# Patient Record
Sex: MALE | Race: WHITE | NOT HISPANIC OR LATINO | Employment: OTHER | URBAN - METROPOLITAN AREA
[De-identification: names, ages, dates, MRNs, and addresses within clinical notes are randomized per-mention and may not be internally consistent; named-entity substitution may affect disease eponyms.]

---

## 2017-01-17 ENCOUNTER — OFFICE VISIT (OUTPATIENT)
Dept: FAMILY MEDICINE | Facility: CLINIC | Age: 67
End: 2017-01-17
Payer: COMMERCIAL

## 2017-01-17 VITALS
HEIGHT: 69 IN | HEART RATE: 66 BPM | TEMPERATURE: 98 F | SYSTOLIC BLOOD PRESSURE: 122 MMHG | BODY MASS INDEX: 21.49 KG/M2 | RESPIRATION RATE: 16 BRPM | WEIGHT: 145.1 LBS | OXYGEN SATURATION: 95 % | DIASTOLIC BLOOD PRESSURE: 80 MMHG

## 2017-01-17 DIAGNOSIS — Z23 NEED FOR PROPHYLACTIC VACCINATION AND INOCULATION AGAINST INFLUENZA: ICD-10-CM

## 2017-01-17 DIAGNOSIS — Z11.59 NEED FOR HEPATITIS C SCREENING TEST: ICD-10-CM

## 2017-01-17 DIAGNOSIS — F51.01 PRIMARY INSOMNIA: ICD-10-CM

## 2017-01-17 DIAGNOSIS — Z00.00 ROUTINE GENERAL MEDICAL EXAMINATION AT A HEALTH CARE FACILITY: Primary | ICD-10-CM

## 2017-01-17 DIAGNOSIS — N40.1 BENIGN NON-NODULAR PROSTATIC HYPERPLASIA WITH LOWER URINARY TRACT SYMPTOMS: ICD-10-CM

## 2017-01-17 DIAGNOSIS — Z23 NEED FOR TDAP VACCINATION: ICD-10-CM

## 2017-01-17 LAB
CHOLEST SERPL-MCNC: 188 MG/DL
GLUCOSE SERPL-MCNC: 87 MG/DL (ref 70–99)
HDLC SERPL-MCNC: 79 MG/DL
LDLC SERPL CALC-MCNC: 94 MG/DL
NONHDLC SERPL-MCNC: 109 MG/DL
PSA SERPL-ACNC: 1.42 UG/L (ref 0–4)
TRIGL SERPL-MCNC: 77 MG/DL

## 2017-01-17 PROCEDURE — 90715 TDAP VACCINE 7 YRS/> IM: CPT | Performed by: FAMILY MEDICINE

## 2017-01-17 PROCEDURE — 90472 IMMUNIZATION ADMIN EACH ADD: CPT | Performed by: FAMILY MEDICINE

## 2017-01-17 PROCEDURE — 36415 COLL VENOUS BLD VENIPUNCTURE: CPT | Performed by: FAMILY MEDICINE

## 2017-01-17 PROCEDURE — 80061 LIPID PANEL: CPT | Performed by: FAMILY MEDICINE

## 2017-01-17 PROCEDURE — 82947 ASSAY GLUCOSE BLOOD QUANT: CPT | Performed by: FAMILY MEDICINE

## 2017-01-17 PROCEDURE — 84153 ASSAY OF PSA TOTAL: CPT | Performed by: FAMILY MEDICINE

## 2017-01-17 PROCEDURE — 90471 IMMUNIZATION ADMIN: CPT | Performed by: FAMILY MEDICINE

## 2017-01-17 PROCEDURE — 86803 HEPATITIS C AB TEST: CPT | Performed by: FAMILY MEDICINE

## 2017-01-17 PROCEDURE — 90686 IIV4 VACC NO PRSV 0.5 ML IM: CPT | Performed by: FAMILY MEDICINE

## 2017-01-17 PROCEDURE — 99396 PREV VISIT EST AGE 40-64: CPT | Mod: 25 | Performed by: FAMILY MEDICINE

## 2017-01-17 RX ORDER — TRIAZOLAM 0.25 MG
TABLET ORAL
Qty: 30 TABLET | Refills: 2 | Status: SHIPPED | OUTPATIENT
Start: 2017-01-17 | End: 2017-10-12

## 2017-01-17 ASSESSMENT — PAIN SCALES - GENERAL: PAINLEVEL: NO PAIN (0)

## 2017-01-17 NOTE — PROGRESS NOTES
SUBJECTIVE:     CC: Carlos Soares is an 66 year old male who presents for preventative health visit.     Healthy Habits:    Do you get at least three servings of calcium containing foods daily (dairy, green leafy vegetables, etc.)? yes    Amount of exercise or daily activities, outside of work: 3 days weekly    Problems taking medications regularly No    Medication side effects: No    Have you had an eye exam in the past two years? yes    Do you see a dentist twice per year? yes  Do you have sleep apnea, excessive snoring or daytime drowsiness?no      Today's PHQ-2 Score:   PHQ-2 ( 1999 Pfizer) 1/17/2017 1/4/2016   Q1: Little interest or pleasure in doing things 0 0   Q2: Feeling down, depressed or hopeless 0 0   PHQ-2 Score 0 0       Abuse: Current or Past(Physical, Sexual or Emotional)- No  Do you feel safe in your environment - Yes    Social History   Substance Use Topics     Smoking status: Current Every Day Smoker -- 0.50 packs/day for 30 years     Types: Cigarettes     Smokeless tobacco: Never Used      Comment: 4-5 cigs daily     Alcohol Use: Yes      Comment: social     The patient does not drink >3 drinks per day nor >7 drinks per week.    Last PSA:   PSA   Date Value Ref Range Status   01/04/2016 0.92 0 - 4 ug/L Final       Recent Labs   Lab Test  01/04/16   0913  11/29/13   0959  11/19/12   0910   CHOL  174  173  158   HDL  64  76  74   LDL  78  79  66   TRIG  160*  95  90   CHOLHDLRATIO   --   2.3  2.1   NHDL  110   --    --        Reviewed orders with patient. Reviewed health maintenance and updated orders accordingly - Yes    All Histories reviewed and updated in Epic.  Here today for routine checkup. For the most part he is doing just fine. His biggest issue previously was BPH with nocturia. Retired a couple of weeks ago and with different sleeping schedule he says is actually doing a bit better. Not getting up at night to urinate as much and for now it is tolerable. We have tried both Flomax and  "Avodart in the past without a lot of results    ROS:  C: NEGATIVE for fever, chills, change in weight  I: NEGATIVE for worrisome rashes, moles or lesions  E: NEGATIVE for vision changes or irritation  ENT: NEGATIVE for ear, mouth and throat problems  R: NEGATIVE for significant cough or SOB  CV: NEGATIVE for chest pain, palpitations or peripheral edema  GI: NEGATIVE for nausea, abdominal pain, heartburn, or change in bowel habits   male: negative for dysuria, hematuria, decreased urinary stream, erectile dysfunction, urethral discharge  M: NEGATIVE for significant arthralgias or myalgia  N: NEGATIVE for weakness, dizziness or paresthesias  P: NEGATIVE for changes in mood or affect    Problem list, Medication list, Allergies, and Medical/Social/Surgical histories reviewed in [facility] and updated as appropriate.  Labs reviewed in EPIC  BP Readings from Last 3 Encounters:   01/17/17 122/80   08/19/16 126/78   01/04/16 114/74    Wt Readings from Last 3 Encounters:   01/17/17 65.817 kg (145 lb 1.6 oz)   08/19/16 64.411 kg (142 lb)   01/04/16 64.093 kg (141 lb 4.8 oz)                  OBJECTIVE:     /80 mmHg  Pulse 66  Temp(Src) 98  F (36.7  C) (Oral)  Resp 16  Ht 1.753 m (5' 9\")  Wt 65.817 kg (145 lb 1.6 oz)  BMI 21.42 kg/m2  SpO2 95%  EXAM:  GENERAL: healthy, alert and no distress  EYES: Eyes grossly normal to inspection, PERRL and conjunctivae and sclerae normal  HENT: ear canals and TM's normal, nose and mouth without ulcers or lesions  NECK: no adenopathy, no asymmetry, masses, or scars and thyroid normal to palpation  RESP: lungs clear to auscultation - no rales, rhonchi or wheezes  CV: regular rate and rhythm, normal S1 S2, no S3 or S4, no murmur, click or rub, no peripheral edema and peripheral pulses strong  ABDOMEN: soft, nontender, no hepatosplenomegaly, no masses and bowel sounds normal  MS: no gross musculoskeletal defects noted, no edema  SKIN: no suspicious lesions or rashes  NEURO: Normal " "strength and tone, mentation intact and speech normal  PSYCH: mentation appears normal, affect normal/bright    ASSESSMENT/PLAN:     1. Routine general medical examination at a health care facility  Update tetanus and flu shot today  - Lipid panel reflex to direct LDL  - Prostate spec antigen screen  - Glucose    2. Benign non-nodular prostatic hyperplasia with lower urinary tract symptoms  For now we will keep an eye on things without medical therapy. Consider referral if needed    3. Primary insomnia    - triazolam (HALCION) 0.25 MG tablet; TAKE 1 TABLET BY MOUTH EVERY NIGHT AT BEDTIME AS NEEDED  Dispense: 30 tablet; Refill: 2    4. Need for hepatitis C screening test    - Hepatitis C antibody    COUNSELING:  Reviewed preventive health counseling, as reflected in patient instructions       Regular exercise       Healthy diet/nutrition       Vision screening       Colon cancer screening       Prostate cancer screening         reports that he has been smoking Cigarettes.  He has a 15 pack-year smoking history. He has never used smokeless tobacco.  Tobacco Cessation Action Plan: Self help information given to patient  Estimated body mass index is 21.42 kg/(m^2) as calculated from the following:    Height as of this encounter: 1.753 m (5' 9\").    Weight as of this encounter: 65.817 kg (145 lb 1.6 oz).       Counseling Resources:  ATP IV Guidelines  Pooled Cohorts Equation Calculator  FRAX Risk Assessment  ICSI Preventive Guidelines  Dietary Guidelines for Americans, 2010  USDA's MyPlate  ASA Prophylaxis  Lung CA Screening    Karley Heart MD  [facility]  "

## 2017-01-17 NOTE — NURSING NOTE
"Chief Complaint   Patient presents with     Physical     pt is fasting       Initial /80 mmHg  Pulse 66  Temp(Src) 98  F (36.7  C) (Oral)  Resp 16  Ht 1.753 m (5' 9\")  Wt 65.817 kg (145 lb 1.6 oz)  BMI 21.42 kg/m2  SpO2 95% Estimated body mass index is 21.42 kg/(m^2) as calculated from the following:    Height as of this encounter: 1.753 m (5' 9\").    Weight as of this encounter: 65.817 kg (145 lb 1.6 oz).  BP completed using cuff size: brnua Anaya MA      "

## 2017-01-17 NOTE — PROGRESS NOTES
Injectable Influenza Immunization Documentation    1.  Is the person to be vaccinated sick today?  No    2. Does the person to be vaccinated have an allergy to eggs or to a component of the vaccine?  No    3. Has the person to be vaccinated today ever had a serious reaction to influenza vaccine in the past?  No    4. Has the person to be vaccinated ever had Guillain-[place] syndrome?  No     Form completed by Philip Anaya MA

## 2017-01-17 NOTE — Clinical Note
[address]  [address]  [phone]    January 19, 2017      Carlos Soares  [address] [facility] [address]              Dear Carlos,    Your lab work looks just fine.  Keep up the good work.      Sincerely,      EDEN Heart MD

## 2017-01-17 NOTE — NURSING NOTE
Screening Questionnaire for Adult Immunization    Are you sick today?   No   Do you have allergies to medications, food, a vaccine component or latex?   No   Have you ever had a serious reaction after receiving a vaccination?   No   Do you have a long-term health problem with heart disease, lung disease, asthma, kidney disease, metabolic disease (e.g. diabetes), anemia, or other blood disorder?   No   Do you have cancer, leukemia, HIV/AIDS, or any other immune system problem?   No   In the past 3 months, have you taken medications that affect  your immune system, such as prednisone, other steroids, or anticancer drugs; drugs for the treatment of rheumatoid arthritis, Crohn s disease, or psoriasis; or have you had radiation treatments?   No   Have you had a seizure, or a brain or other nervous system problem?   No   During the past year, have you received a transfusion of blood or blood     products, or been given immune (gamma) globulin or antiviral drug?   No   For women: Are you pregnant or is there a chance you could become        pregnant during the next month?   No   Have you received any vaccinations in the past 4 weeks?   No     Immunization questionnaire answers were all negative.      MNVFC doesn't apply on this patient    Per orders of Dr. Heart, injection of Tdap, Flu given by Wilner Anaya. Patient instructed to remain in clinic for 20 minutes afterwards, and to report any adverse reaction to me immediately.       Screening performed by Wilner Anaya on 1/17/2017 at 9:59 AM.

## 2017-01-17 NOTE — MR AVS SNAPSHOT
After Visit Summary   1/17/2017    Carlos Soares    MRN: [id number]           Patient Information     Date Of Birth          1950        Visit Information        Provider Department      1/17/2017 9:00 AM Karley Heart MD [facility]        Today's Diagnoses     Routine general medical examination at a health care facility    -  1     Benign non-nodular prostatic hyperplasia with lower urinary tract symptoms         Primary insomnia         Need for hepatitis C screening test           Care Instructions      Preventive Health Recommendations  Male Ages 50 - 64    Yearly exam:             See your health care provider every year in order to  o   Review health changes.   o   Discuss preventive care.    o   Review your medicines if your doctor has prescribed any.     Have a cholesterol test every 5 years, or more frequently if you are at risk for high cholesterol/heart disease.     Have a diabetes test (fasting glucose) every three years. If you are at risk for diabetes, you should have this test more often.     Have a colonoscopy at age 50, or have a yearly FIT test (stool test). These exams will check for colon cancer.      Talk with your health care provider about whether or not a prostate cancer screening test (PSA) is right for you.    You should be tested each year for STDs (sexually transmitted diseases), if you re at risk.     Shots: Get a flu shot each year. Get a tetanus shot every 10 years.     Nutrition:    Eat at least 5 servings of fruits and vegetables daily.     Eat whole-grain bread, whole-wheat pasta and brown rice instead of white grains and rice.     Talk to your provider about Calcium and Vitamin D.     Lifestyle    Exercise for at least 150 minutes a week (30 minutes a day, 5 days a week). This will help you control your weight and prevent disease.     Limit alcohol to one drink per day.     No smoking.     Wear sunscreen to prevent skin cancer.  "    See your dentist every six months for an exam and cleaning.     See your eye doctor every 1 to 2 years.            Follow-ups after your visit        Follow-up notes from your care team     Return in about 1 year (around 2018).      Who to contact     If you have questions or need follow up information about today's clinic visit or your schedule please contact [facility] directly at [phone].  Normal or non-critical lab and imaging results will be communicated to you by [employer], letter or phone within 4 business days after the clinic has received the results. If you do not hear from us within 7 days, please contact the clinic through Pronghart or phone. If you have a critical or abnormal lab result, we will notify you by phone as soon as possible.  Submit refill requests through [employer] or call your pharmacy and they will forward the refill request to us. Please allow 3 business days for your refill to be completed.          Additional Information About Your Visit        ProngharCloudCrowd Information     [employer] lets you send messages to your doctor, view your test results, renew your prescriptions, schedule appointments and more. To sign up, go to [email or web address] . Click on \"Log in\" on the left side of the screen, which will take you to the Welcome page. Then click on \"Sign up Now\" on the right side of the page.     You will be asked to enter the access code listed below, as well as some personal information. Please follow the directions to create your username and password.     Your access code is: [email or web address]  Expires: 2017  9:42 AM     Your access code will  in 90 days. If you need help or a new code, please call your [facility] or [phone].        Care EveryWhere ID     This is your Care EveryWhere ID. This could be used by other organizations to access your [facility] medical records  [id number]        Your Vitals Were     Pulse Temperature Respirations Height BMI " "(Body Mass Index) Pulse Oximetry    66 98  F (36.7  C) (Oral) 16 1.753 m (5' 9\") 21.42 kg/m2 95%       Blood Pressure from Last 3 Encounters:   01/17/17 122/80   08/19/16 126/78   01/04/16 114/74    Weight from Last 3 Encounters:   01/17/17 65.817 kg (145 lb 1.6 oz)   08/19/16 64.411 kg (142 lb)   01/04/16 64.093 kg (141 lb 4.8 oz)              We Performed the Following     Glucose     Hepatitis C antibody     Lipid panel reflex to direct LDL     Prostate spec antigen screen          Where to get your medicines      Some of these will need a paper prescription and others can be bought over the counter.  Ask your nurse if you have questions.     Bring a paper prescription for each of these medications    - triazolam 0.25 MG tablet       Primary Care Provider Office Phone # Fax #    Karley T[facility] Heart [phone]       [address] [address]        Thank you!     Thank you for choosing [facility]  for your care. Our goal is always to provide you with excellent care. Hearing back from our patients is one way we can continue to improve our services. Please take a few minutes to complete the written survey that you may receive in the mail after your visit with us. Thank you!             Your Updated Medication List - Protect others around you: Learn how to safely use, store and throw away your medicines at www.disposemymeds.org.          This list is accurate as of: 1/17/17  9:42 AM.  Always use your most recent med list.                   Brand Name Dispense Instructions for use    PSYLLIUM PO      Daily       tadalafil 10 MG tablet    CIALIS    10 tablet    Take 1 tablet by mouth daily as needed for erectile dysfunction.       triazolam 0.25 MG tablet    HALCION    30 tablet    TAKE 1 TABLET BY MOUTH EVERY NIGHT AT BEDTIME AS NEEDED       vardenafil 20 MG tablet    LEVITRA    6 tablet    Take 1 tablet by mouth daily as needed for " erectile dysfunction.

## 2017-01-18 LAB — HCV AB SERPL QL IA: NORMAL

## 2017-01-19 NOTE — PROGRESS NOTES
Quick Note:    Please mail results and note to patient:    Your lab work looks just fine. Keep up the good work.  EDEN Heart MD  ______

## 2017-04-11 ENCOUNTER — TELEPHONE (OUTPATIENT)
Dept: OTHER | Facility: CLINIC | Age: 67
End: 2017-04-11

## 2017-10-05 DIAGNOSIS — F51.01 PRIMARY INSOMNIA: ICD-10-CM

## 2017-10-05 NOTE — TELEPHONE ENCOUNTER
Reason for Call:  Medication or medication refill:    Do you use a [facility]?  Name of the pharmacy and phone number for the current request:  [facility] [address] [place] [address], [place] - [address] [place] [address]    Name of the medication requested: triazolam (HALCION) 0.25 MG tablet    Other request: Please note he switched pharmacy from his usual.  Using [facility] on [facility] in [place].   Thank you     Can we leave a detailed message on this number? YES    Phone number patient can be reached at: Home number on file [phone] (home)    Best Time: Any    Call taken on 10/5/2017 at 10:59 AM by Ze Regan

## 2017-10-06 RX ORDER — TRIAZOLAM 0.25 MG
TABLET ORAL
Qty: 30 TABLET | Refills: 2 | OUTPATIENT
Start: 2017-10-06

## 2017-10-06 NOTE — TELEPHONE ENCOUNTER
triazolam (HALCION) 0.25 MG tablet      Last Written Prescription Date:  01/17/17  Last Fill Quantity: 30,   # refills: 2  Last Office Visit with [facility], [facility] or [facility] prescribing provider: 01/17/17 Dr. Heart  Future Office visit:       Routing refill request to provider for review/approval because:  Drug not on the [facility], [facility] or [facility] refill protocol or controlled substance

## 2017-10-12 RX ORDER — TRIAZOLAM 0.25 MG
TABLET ORAL
Qty: 30 TABLET | Refills: 2 | Status: SHIPPED | OUTPATIENT
Start: 2017-10-12 | End: 2018-01-19

## 2017-10-12 NOTE — TELEPHONE ENCOUNTER
Reason for Call:  Other call back    Detailed comments: patient is calling to check status on refill. Please call patient to further discuss this matter. Thank you.     Phone Number Patient can be reached at: Home number on file [phone] (home)    Best Time: anytime     Can we leave a detailed message on this number? YES    Call taken on 10/12/2017 at 11:12 AM by Chadd Carlin

## 2017-10-12 NOTE — TELEPHONE ENCOUNTER
Halcion- Routing refill request to provider for review/approval because:  A break in medication  Fiona Gan RN.

## 2017-12-24 ENCOUNTER — OFFICE VISIT (OUTPATIENT)
Dept: URGENT CARE | Facility: URGENT CARE | Age: 67
End: 2017-12-24
Payer: COMMERCIAL

## 2017-12-24 VITALS
SYSTOLIC BLOOD PRESSURE: 157 MMHG | DIASTOLIC BLOOD PRESSURE: 88 MMHG | HEART RATE: 104 BPM | WEIGHT: 143.4 LBS | BODY MASS INDEX: 21.18 KG/M2 | OXYGEN SATURATION: 99 % | TEMPERATURE: 97.5 F

## 2017-12-24 DIAGNOSIS — R31.0 GROSS HEMATURIA: Primary | ICD-10-CM

## 2017-12-24 LAB
ALBUMIN UR-MCNC: 100 MG/DL
APPEARANCE UR: ABNORMAL
BACTERIA #/AREA URNS HPF: ABNORMAL /HPF
BILIRUB UR QL STRIP: NEGATIVE
COLOR UR AUTO: ABNORMAL
GLUCOSE UR STRIP-MCNC: NEGATIVE MG/DL
HGB UR QL STRIP: ABNORMAL
KETONES UR STRIP-MCNC: ABNORMAL MG/DL
LEUKOCYTE ESTERASE UR QL STRIP: NEGATIVE
NITRATE UR QL: NEGATIVE
NON-SQ EPI CELLS #/AREA URNS LPF: ABNORMAL /LPF
PH UR STRIP: 6 PH (ref 5–7)
RBC #/AREA URNS AUTO: >100 /HPF
SOURCE: ABNORMAL
SP GR UR STRIP: 1.02 (ref 1–1.03)
UROBILINOGEN UR STRIP-ACNC: 0.2 EU/DL (ref 0.2–1)
WBC #/AREA URNS AUTO: ABNORMAL /HPF

## 2017-12-24 PROCEDURE — 99213 OFFICE O/P EST LOW 20 MIN: CPT | Performed by: FAMILY MEDICINE

## 2017-12-24 PROCEDURE — 81001 URINALYSIS AUTO W/SCOPE: CPT | Performed by: NURSE PRACTITIONER

## 2017-12-24 RX ORDER — TAMSULOSIN HYDROCHLORIDE 0.4 MG/1
0.4 CAPSULE ORAL DAILY
Qty: 30 CAPSULE | Refills: 1 | Status: SHIPPED | OUTPATIENT
Start: 2017-12-24 | End: 2018-01-19

## 2017-12-24 NOTE — PROGRESS NOTES
SUBJECTIVE:   Carlos Soares is a 67 year old male who presents to clinic today for the following health issues:      Blood in urine      Duration: 1 day    Description (location/character/radiation): blood in urine, right flank pain    Urinary frequency     Intensity:  moderate    Accompanying signs and symptoms: blood in urine, lower back pain    History (similar episodes/previous evaluation): None    Precipitating or alleviating factors: None    Therapies tried and outcome: None    No fever, chills, constipation, diarrhea  or other relevant systemic symptoms     Not taking any blood thinners          Problem list and histories reviewed & adjusted, as indicated.  Additional history: as documented    Patient Active Problem List   Diagnosis     Hemorrhoids     Insomnia     ED (erectile dysfunction)     CARDIOVASCULAR SCREENING; LDL GOAL LESS THAN 160     Tobacco abuse     Advanced directives, counseling/discussion     Benign non-nodular prostatic hyperplasia with lower urinary tract symptoms     Past Surgical History:   Procedure Laterality Date     C DESTRUCTION HEMORRHOIDS,INT/EXTERN  2008, 2009     HC REPAIR SLIDING INGUINAL HERNIA  12/28/07       Social History   Substance Use Topics     Smoking status: Current Every Day Smoker     Packs/day: 0.50     Years: 30.00     Types: Cigarettes     Smokeless tobacco: Never Used      Comment: 4-5 cigs daily     Alcohol use Yes      Comment: social     Family History   Problem Relation Age of Onset     CEREBROVASCULAR DISEASE Father          Current Outpatient Prescriptions   Medication Sig Dispense Refill     triazolam (HALCION) 0.25 MG tablet TAKE 1 TABLET BY MOUTH EVERY NIGHT AT BEDTIME AS NEEDED 30 tablet 2     vardenafil (LEVITRA) 20 MG tablet Take 1 tablet by mouth daily as needed for erectile dysfunction. 6 tablet 11     tadalafil (CIALIS) 10 MG tablet Take 1 tablet by mouth daily as needed for erectile dysfunction. 10 tablet 11     PSYLLIUM OR Daily       No  Known Allergies  Recent Labs   Lab Test  01/17/17   1010  01/04/16   0913  11/29/13   0959   LDL  94  78  79   HDL  79  64  76   TRIG  77  160*  95      BP Readings from Last 3 Encounters:   12/24/17 157/88   01/17/17 122/80   08/19/16 126/78    Wt Readings from Last 3 Encounters:   12/24/17 143 lb 6.4 oz (65 kg)   01/17/17 145 lb 1.6 oz (65.8 kg)   08/19/16 142 lb (64.4 kg)                  Labs reviewed in EPIC          Reviewed and updated as needed this visit by clinical staff     Reviewed and updated as needed this visit by Provider         ROS:  Constitutional, HEENT, cardiovascular, pulmonary, gi and gu systems are negative, except as otherwise noted.      OBJECTIVE:   /88 (BP Location: Left arm, Patient Position: Chair, Cuff Size: Adult Regular)  Pulse 104  Temp 97.5  F (36.4  C) (Oral)  Wt 143 lb 6.4 oz (65 kg)  SpO2 99%  BMI 21.18 kg/m2  Body mass index is 21.18 kg/(m^2).  GENERAL: healthy, alert and no distress  NECK: no adenopathy, no asymmetry, masses, or scars and thyroid normal to palpation  RESP: lungs clear to auscultation - no rales, rhonchi or wheezes  CV: regular rate and rhythm, normal S1 S2, no S3 or S4, no murmur, click or rub, no peripheral edema and peripheral pulses strong  ABDOMEN: soft, nontender, no hepatosplenomegaly, no masses and bowel sounds normal  MS: no gross musculoskeletal defects noted, no edema  NEURO: Normal strength and tone, mentation intact and speech normal  BACK: no CVA tenderness, no paralumbar tenderness    Diagnostic Test Results:  Results for orders placed or performed in visit on 12/24/17 (from the past 24 hour(s))   UA reflex to Microscopic and Culture   Result Value Ref Range    Color Urine Red     Appearance Urine Cloudy     Glucose Urine Negative NEG^Negative mg/dL    Bilirubin Urine Negative NEG^Negative    Ketones Urine Trace (A) NEG^Negative mg/dL    Specific Gravity Urine 1.025 1.003 - 1.035    Blood Urine Large (A) NEG^Negative    pH Urine 6.0  5.0 - 7.0 pH    Protein Albumin Urine 100 (A) NEG^Negative mg/dL    Urobilinogen Urine 0.2 0.2 - 1.0 EU/dL    Nitrite Urine Negative NEG^Negative    Leukocyte Esterase Urine Negative NEG^Negative    Source Midstream Urine    Urine Microscopic   Result Value Ref Range    WBC Urine 2-5 (A) OTO2^O - 2 /HPF    RBC Urine >100 (A) OTO2^O - 2 /HPF    Squamous Epithelial /LPF Urine Few FEW^Few /LPF    Bacteria Urine Few (A) NEG^Negative /HPF       ASSESSMENT/PLAN:         ICD-10-CM    1. Gross hematuria R31.0 tamsulosin (FLOMAX) 0.4 MG capsule     CT Abdomen Pelvis w Contrast     Stone analysis     UROLOGY ADULT REFERRAL       67 year old male presents with mild right flank pain and gross hematuria. UA findings consistent with >100 RBCs. Symptoms and labs consistent with high likelihood of renal stones as underlying etiology. Suggested well hydration, OTC analgesia and urinary straining. Flomax prescribed, common side effects discusses. Recommended to schedule CT abdomen/pelvis and urology appointe. Instructed to go ER if symptoms persist or worsen. Written information provided. All questions answered.         Patient Instructions     Blood in the Urine    Blood in the urine (hematuria) has many possible causes. If it occurs after an injury (such as a car accident or fall), it is most often a sign of bruising to the kidney or bladder. Common causes of blood in the urine include urinary tract infections, kidney stones, inflammation, tumors, or certain other diseases of the kidney or bladder. Menstruation can cause blood to appear in the urine sample, although it is not coming from the urinary tract.  If only a trace amount of blood is present, it will show up on the urine test, even though the urine may be yellow and not pink or red. This may occur with any of the above conditions, as well as heavy exercise or high fever. In this case, your doctor may want to repeat the urine test on another day. This will show if the blood  is still present. If it is, then other tests can be done to find out the cause.  Home care  Follow these home care guidelines:    If your urine does not appear bloody (pink, brown or red) then you do not need to restrict your activity in any way.    If you can see blood in your urine, rest and avoid heavy exertion until your next exam. Do not use aspirin, blood thinners, or anti-platelet or anti-inflammatory medicines. These include ibuprofen and naproxen. These thin the blood and may increase bleeding.  Follow-up care  Follow up with your healthcare provider, or as advised. If you were injured and had blood in your urine, you should have a repeat urine test in 1 to 2 days. Contact your doctor for this test.  A radiologist will review any X-rays that were taken. You will be told of any new findings that may affect your care.  When to seek medical advice  Call your healthcare provider right away if any of these occur:    Bright red blood or blood clots in the urine (if you did not have this before)    Weakness, dizziness or fainting    New groin, abdominal, or back pain    Fever of 100.4 F (38 C) or higher, or as directed by your healthcare provider    Repeated vomiting    Bleeding from the nose or gums or easy bruising  Date Last Reviewed: 9/1/2016 2000-2017 [employer]. [address] [place] [address], [place] [address]. All rights reserved. This information is not intended as a substitute for professional medical care. Always follow your healthcare professional's instructions.        Tamsulosin Hydrochloride Oral capsule  What is this medicine?  TAMSULOSIN (candelaria JODI luis sin) is used to treat enlargement of the prostate gland in men, a condition called benign prostatic hyperplasia or BPH. It is not for use in women. It works by relaxing muscles in the prostate and bladder neck. This improves urine flow and reduces BPH symptoms.  This medicine may be used for other purposes; ask your health care provider or  pharmacist if you have questions.  What should I tell my health care provider before I take this medicine?  They need to know if you have any of the following conditions:    advanced kidney disease    advanced liver disease    low blood pressure    prostate cancer    an unusual or allergic reaction to tamsulosin, sulfa drugs, other medicines, foods, dyes, or preservatives    pregnant or trying to get pregnant    breast-feeding  How should I use this medicine?  Take this medicine by mouth about 30 minutes after the same meal every day. Follow the directions on the prescription label. Swallow the capsules whole with a glass of water. Do not crush, chew, or open capsules. Do not take your medicine more often than directed. Do not stop taking your medicine unless your doctor tells you to.  Talk to your pediatrician regarding the use of this medicine in children. Special care may be needed.  Overdosage: If you think you have taken too much of this medicine contact a poison control center or emergency room at once.  NOTE: This medicine is only for you. Do not share this medicine with others.  What if I miss a dose?  If you miss a dose, take it as soon as you can. If it is almost time for your next dose, take only that dose. Do not take double or extra doses. If you stop taking your medicine for several days or more, ask your doctor or health care professional what dose you should start back on.  What may interact with this medicine?    cimetidine    fluoxetine    ketoconazole    medicines for erectile disfunction like sildenafil, tadalafil, vardenafil    medicines for high blood pressure    other alpha-blockers like alfuzosin, doxazosin, phentolamine, phenoxybenzamine, prazosin, terazosin    warfarin  This list may not describe all possible interactions. Give your health care provider a list of all the medicines, herbs, non-prescription drugs, or dietary supplements you use. Also tell them if you smoke, drink alcohol, or  use illegal drugs. Some items may interact with your medicine.  What should I watch for while using this medicine?  Visit your doctor or health care professional for regular check ups. You will need lab work done before you start this medicine and regularly while you are taking it. Check your blood pressure as directed. Ask your health care professional what your blood pressure should be, and when you should contact him or her.  This medicine may make you feel dizzy or lightheaded. This is more likely to happen after the first dose, after an increase in dose, or during hot weather or exercise. Drinking alcohol and taking some medicines can make this worse. Do not drive, use machinery, or do anything that needs mental alertness until you know how this medicine affects you. Do not sit or stand up quickly. If you begin to feel dizzy, sit down until you feel better. These effects can decrease once your body adjusts to the medicine.  Contact your doctor or health care professional right away if you have an erection that lasts longer than 4 hours or if it becomes painful. This may be a sign of a serious problem and must be treated right away to prevent permanent damage.  If you are thinking of having cataract surgery, tell your eye surgeon that you have taken this medicine.  What side effects may I notice from receiving this medicine?  Side effects that you should report to your doctor or health care professional as soon as possible:    allergic reactions like skin rash or itching, hives, swelling of the lips, mouth, tongue, or throat    breathing problems    change in vision    feeling faint or lightheaded    irregular heartbeat    prolonged or painful erection    weakness  Side effects that usually do not require medical attention (report to your doctor or health care professional if they continue or are bothersome):    back pain    change in sex drive or performance    constipation, nausea or  vomiting    cough    drowsy    runny or stuffy nose    trouble sleeping  This list may not describe all possible side effects. Call your doctor for medical advice about side effects. You may report side effects to FDA at 8-401-APT-9140.  Where should I keep my medicine?  Keep out of the reach of children.  Store at room temperature between 15 and 30 degrees C (59 and 86 degrees F). Throw away any unused medicine after the expiration date.  NOTE:This sheet is a summary. It may not cover all possible information. If you have questions about this medicine, talk to your doctor, pharmacist, or health care provider. Copyright  2016 [employer]            Samuel Wagner MD  [facility]

## 2017-12-24 NOTE — MR AVS SNAPSHOT
After Visit Summary   12/24/2017    Carlos Soares    MRN: [id number]           Patient Information     Date Of Birth          1950        Visit Information        Provider Department      12/24/2017 1:30 PM Samuel Wagner MD [facility]        Today's Diagnoses     Gross hematuria    -  1      Care Instructions      Blood in the Urine    Blood in the urine (hematuria) has many possible causes. If it occurs after an injury (such as a car accident or fall), it is most often a sign of bruising to the kidney or bladder. Common causes of blood in the urine include urinary tract infections, kidney stones, inflammation, tumors, or certain other diseases of the kidney or bladder. Menstruation can cause blood to appear in the urine sample, although it is not coming from the urinary tract.  If only a trace amount of blood is present, it will show up on the urine test, even though the urine may be yellow and not pink or red. This may occur with any of the above conditions, as well as heavy exercise or high fever. In this case, your doctor may want to repeat the urine test on another day. This will show if the blood is still present. If it is, then other tests can be done to find out the cause.  Home care  Follow these home care guidelines:    If your urine does not appear bloody (pink, brown or red) then you do not need to restrict your activity in any way.    If you can see blood in your urine, rest and avoid heavy exertion until your next exam. Do not use aspirin, blood thinners, or anti-platelet or anti-inflammatory medicines. These include ibuprofen and naproxen. These thin the blood and may increase bleeding.  Follow-up care  Follow up with your healthcare provider, or as advised. If you were injured and had blood in your urine, you should have a repeat urine test in 1 to 2 days. Contact your doctor for this test.  A radiologist will review any X-rays that were taken. You will be  told of any new findings that may affect your care.  When to seek medical advice  Call your healthcare provider right away if any of these occur:    Bright red blood or blood clots in the urine (if you did not have this before)    Weakness, dizziness or fainting    New groin, abdominal, or back pain    Fever of 100.4 F (38 C) or higher, or as directed by your healthcare provider    Repeated vomiting    Bleeding from the nose or gums or easy bruising  Date Last Reviewed: 9/1/2016 2000-2017 [employer]. [address] [place], [place] [id number]. All rights reserved. This information is not intended as a substitute for professional medical care. Always follow your healthcare professional's instructions.        Tamsulosin Hydrochloride Oral capsule  What is this medicine?  TAMSULOSIN (candelaria JODI luis sin) is used to treat enlargement of the prostate gland in men, a condition called benign prostatic hyperplasia or BPH. It is not for use in women. It works by relaxing muscles in the prostate and bladder neck. This improves urine flow and reduces BPH symptoms.  This medicine may be used for other purposes; ask your health care provider or pharmacist if you have questions.  What should I tell my health care provider before I take this medicine?  They need to know if you have any of the following conditions:    advanced kidney disease    advanced liver disease    low blood pressure    prostate cancer    an unusual or allergic reaction to tamsulosin, sulfa drugs, other medicines, foods, dyes, or preservatives    pregnant or trying to get pregnant    breast-feeding  How should I use this medicine?  Take this medicine by mouth about 30 minutes after the same meal every day. Follow the directions on the prescription label. Swallow the capsules whole with a glass of water. Do not crush, chew, or open capsules. Do not take your medicine more often than directed. Do not stop taking your medicine unless your doctor  tells you to.  Talk to your pediatrician regarding the use of this medicine in children. Special care may be needed.  Overdosage: If you think you have taken too much of this medicine contact a poison control center or emergency room at once.  NOTE: This medicine is only for you. Do not share this medicine with others.  What if I miss a dose?  If you miss a dose, take it as soon as you can. If it is almost time for your next dose, take only that dose. Do not take double or extra doses. If you stop taking your medicine for several days or more, ask your doctor or health care professional what dose you should start back on.  What may interact with this medicine?    cimetidine    fluoxetine    ketoconazole    medicines for erectile disfunction like sildenafil, tadalafil, vardenafil    medicines for high blood pressure    other alpha-blockers like alfuzosin, doxazosin, phentolamine, phenoxybenzamine, prazosin, terazosin    warfarin  This list may not describe all possible interactions. Give your health care provider a list of all the medicines, herbs, non-prescription drugs, or dietary supplements you use. Also tell them if you smoke, drink alcohol, or use illegal drugs. Some items may interact with your medicine.  What should I watch for while using this medicine?  Visit your doctor or health care professional for regular check ups. You will need lab work done before you start this medicine and regularly while you are taking it. Check your blood pressure as directed. Ask your health care professional what your blood pressure should be, and when you should contact him or her.  This medicine may make you feel dizzy or lightheaded. This is more likely to happen after the first dose, after an increase in dose, or during hot weather or exercise. Drinking alcohol and taking some medicines can make this worse. Do not drive, use machinery, or do anything that needs mental alertness until you know how this medicine affects you.  Do not sit or stand up quickly. If you begin to feel dizzy, sit down until you feel better. These effects can decrease once your body adjusts to the medicine.  Contact your doctor or health care professional right away if you have an erection that lasts longer than 4 hours or if it becomes painful. This may be a sign of a serious problem and must be treated right away to prevent permanent damage.  If you are thinking of having cataract surgery, tell your eye surgeon that you have taken this medicine.  What side effects may I notice from receiving this medicine?  Side effects that you should report to your doctor or health care professional as soon as possible:    allergic reactions like skin rash or itching, hives, swelling of the lips, mouth, tongue, or throat    breathing problems    change in vision    feeling faint or lightheaded    irregular heartbeat    prolonged or painful erection    weakness  Side effects that usually do not require medical attention (report to your doctor or health care professional if they continue or are bothersome):    back pain    change in sex drive or performance    constipation, nausea or vomiting    cough    drowsy    runny or stuffy nose    trouble sleeping  This list may not describe all possible side effects. Call your doctor for medical advice about side effects. You may report side effects to FDA at 1-727-NTM-2237.  Where should I keep my medicine?  Keep out of the reach of children.  Store at room temperature between 15 and 30 degrees C (59 and 86 degrees F). Throw away any unused medicine after the expiration date.  NOTE:This sheet is a summary. It may not cover all possible information. If you have questions about this medicine, talk to your doctor, pharmacist, or health care provider. Copyright  2016 [employer]                Follow-ups after your visit        Additional Services     UROLOGY ADULT REFERRAL       Your provider has referred you to: [facility]: [place] [facility]  [facility] [phone]   [email or web address]    Please be aware that coverage of these services is subject to the terms and limitations of your health insurance plan.  Call member services at your health plan with any benefit or coverage questions.      Please bring the following with you to your appointment:    (1) Any X-Rays, CTs or MRIs which have been performed.  Contact the facility where they were done to arrange for  prior to your scheduled appointment.    (2) List of current medications  (3) This referral request   (4) Any documents/labs given to you for this referral                  Your next 10 appointments already scheduled     Jan 19, 2018  9:20 AM CST   [facility]    [address] [facility] [address] [phone]              Future tests that were ordered for you today     Open Future Orders        Priority Expected Expires Ordered    Stone analysis Routine  12/24/2018 12/24/2017    CT Abdomen Pelvis w Contrast Routine  12/24/2018 12/24/2017            Who to contact     If you have questions or need follow up information about today's clinic visit or your schedule please contact [facility] TORI RAMIREZ directly at [phone].  Normal or non-critical lab and imaging results will be communicated to you by MyChart, letter or phone within 4 business days after the clinic has received the results. If you do not hear from us within 7 days, please contact the clinic through MyChart or phone. If you have a critical or abnormal lab result, we will notify you by phone as soon as possible.  Submit refill requests through [employer] or call your pharmacy and they will forward the refill request to us. Please allow 3 business days for your refill to be completed.          Additional Information About  "Your Visit        [employer] Information     [employer] lets you send messages to your doctor, view your test results, renew your prescriptions, schedule appointments and more. To sign up, go to [email or web address] . Click on \"Log in\" on the left side of the screen, which will take you to the Welcome page. Then click on \"Sign up Now\" on the right side of the page.     You will be asked to enter the access code listed below, as well as some personal information. Please follow the directions to create your username and password.     Your access code is: [email or web address]  Expires: 3/24/2018  3:09 PM     Your access code will  in 90 days. If you need help or a new code, please call your [facility] clinic or [phone].        Care EveryWhere ID     This is your Care EveryWhere ID. This could be used by other organizations to access your [facility] medical records  [id number]        Your Vitals Were     Pulse Temperature Pulse Oximetry BMI (Body Mass Index)          104 97.5  F (36.4  C) (Oral) 99% 21.18 kg/m2         Blood Pressure from Last 3 Encounters:   17 157/88   17 122/80   16 126/78    Weight from Last 3 Encounters:   17 143 lb 6.4 oz (65 kg)   17 145 lb 1.6 oz (65.8 kg)   16 142 lb (64.4 kg)              We Performed the Following     UA reflex to Microscopic and Culture     Urine Microscopic     UROLOGY ADULT REFERRAL          Today's Medication Changes          These changes are accurate as of: 17  3:10 PM.  If you have any questions, ask your nurse or doctor.               Start taking these medicines.        Dose/Directions    tamsulosin 0.4 MG capsule   Commonly known as:  FLOMAX   Used for:  Gross hematuria   Started by:  Samuel Wagner MD        Dose:  0.4 mg   Take 1 capsule (0.4 mg) by mouth daily   Quantity:  30 capsule   Refills:  1            Where to get your medicines      These medications were sent to [address] [address]     Phone:  [phone]     tamsulosin 0.4 MG capsule                Primary Care Provider Office Phone # Fax #    Karley [facility] [phone] [address] [facility] [place] [address]        Equal Access to Services     TAVO GARNETT : Hadii aad ku hadasho Soomaali, waaxda luqadaha, qaybta kaalmada adeegyada, waxay idiin hayaan adeeg kharash la'matthewn . So [facility] [phone].    ATENCIÓN: Si habla español, tiene a lamb disposición servicios gratuitos de asistencia lingüística. Llame al [phone].    We comply with applicable federal civil rights laws and [place] laws. We do not discriminate on the basis of race, color, national origin, age, disability, sex, sexual orientation, or gender identity.            Thank you!     Thank you for choosing [facility]  for your care. Our goal is always to provide you with excellent care. Hearing back from our patients is one way we can continue to improve our services. Please take a few minutes to complete the written survey that you may receive in the mail after your visit with us. Thank you!             Your Updated Medication List - Protect others around you: Learn how to safely use, store and throw away your medicines at www.disposemymeds.org.          This list is accurate as of: 12/24/17  3:10 PM.  Always use your most recent med list.                   Brand Name Dispense Instructions for use Diagnosis    PSYLLIUM PO      Daily        tadalafil 10 MG tablet    CIALIS    10 tablet    Take 1 tablet by mouth daily as needed for erectile dysfunction.    ED (erectile dysfunction)       tamsulosin 0.4 MG capsule    FLOMAX    30 capsule    Take 1 capsule (0.4 mg) by mouth daily    Gross hematuria       triazolam 0.25 MG tablet    HALCION    30 tablet    TAKE 1 TABLET BY MOUTH EVERY NIGHT AT BEDTIME AS NEEDED    Primary insomnia       vardenafil 20 MG tablet     LEVITRA    6 tablet    Take 1 tablet by mouth daily as needed for erectile dysfunction.    ED (erectile dysfunction)

## 2017-12-24 NOTE — PATIENT INSTRUCTIONS
Blood in the Urine    Blood in the urine (hematuria) has many possible causes. If it occurs after an injury (such as a car accident or fall), it is most often a sign of bruising to the kidney or bladder. Common causes of blood in the urine include urinary tract infections, kidney stones, inflammation, tumors, or certain other diseases of the kidney or bladder. Menstruation can cause blood to appear in the urine sample, although it is not coming from the urinary tract.  If only a trace amount of blood is present, it will show up on the urine test, even though the urine may be yellow and not pink or red. This may occur with any of the above conditions, as well as heavy exercise or high fever. In this case, your doctor may want to repeat the urine test on another day. This will show if the blood is still present. If it is, then other tests can be done to find out the cause.  Home care  Follow these home care guidelines:    If your urine does not appear bloody (pink, brown or red) then you do not need to restrict your activity in any way.    If you can see blood in your urine, rest and avoid heavy exertion until your next exam. Do not use aspirin, blood thinners, or anti-platelet or anti-inflammatory medicines. These include ibuprofen and naproxen. These thin the blood and may increase bleeding.  Follow-up care  Follow up with your healthcare provider, or as advised. If you were injured and had blood in your urine, you should have a repeat urine test in 1 to 2 days. Contact your doctor for this test.  A radiologist will review any X-rays that were taken. You will be told of any new findings that may affect your care.  When to seek medical advice  Call your healthcare provider right away if any of these occur:    Bright red blood or blood clots in the urine (if you did not have this before)    Weakness, dizziness or fainting    New groin, abdominal, or back pain    Fever of 100.4 F (38 C) or higher, or as directed by  your healthcare provider    Repeated vomiting    Bleeding from the nose or gums or easy bruising  Date Last Reviewed: 9/1/2016 2000-2017 [employer]. [address] [place] [address], [place] [address]. All rights reserved. This information is not intended as a substitute for professional medical care. Always follow your healthcare professional's instructions.        Tamsulosin Hydrochloride Oral capsule  What is this medicine?  TAMSULOSIN (candelaria JODI luis sin) is used to treat enlargement of the prostate gland in men, a condition called benign prostatic hyperplasia or BPH. It is not for use in women. It works by relaxing muscles in the prostate and bladder neck. This improves urine flow and reduces BPH symptoms.  This medicine may be used for other purposes; ask your health care provider or pharmacist if you have questions.  What should I tell my health care provider before I take this medicine?  They need to know if you have any of the following conditions:    advanced kidney disease    advanced liver disease    low blood pressure    prostate cancer    an unusual or allergic reaction to tamsulosin, sulfa drugs, other medicines, foods, dyes, or preservatives    pregnant or trying to get pregnant    breast-feeding  How should I use this medicine?  Take this medicine by mouth about 30 minutes after the same meal every day. Follow the directions on the prescription label. Swallow the capsules whole with a glass of water. Do not crush, chew, or open capsules. Do not take your medicine more often than directed. Do not stop taking your medicine unless your doctor tells you to.  Talk to your pediatrician regarding the use of this medicine in children. Special care may be needed.  Overdosage: If you think you have taken too much of this medicine contact a poison control center or emergency room at once.  NOTE: This medicine is only for you. Do not share this medicine with others.  What if I miss a dose?  If you miss a  dose, take it as soon as you can. If it is almost time for your next dose, take only that dose. Do not take double or extra doses. If you stop taking your medicine for several days or more, ask your doctor or health care professional what dose you should start back on.  What may interact with this medicine?    cimetidine    fluoxetine    ketoconazole    medicines for erectile disfunction like sildenafil, tadalafil, vardenafil    medicines for high blood pressure    other alpha-blockers like alfuzosin, doxazosin, phentolamine, phenoxybenzamine, prazosin, terazosin    warfarin  This list may not describe all possible interactions. Give your health care provider a list of all the medicines, herbs, non-prescription drugs, or dietary supplements you use. Also tell them if you smoke, drink alcohol, or use illegal drugs. Some items may interact with your medicine.  What should I watch for while using this medicine?  Visit your doctor or health care professional for regular check ups. You will need lab work done before you start this medicine and regularly while you are taking it. Check your blood pressure as directed. Ask your health care professional what your blood pressure should be, and when you should contact him or her.  This medicine may make you feel dizzy or lightheaded. This is more likely to happen after the first dose, after an increase in dose, or during hot weather or exercise. Drinking alcohol and taking some medicines can make this worse. Do not drive, use machinery, or do anything that needs mental alertness until you know how this medicine affects you. Do not sit or stand up quickly. If you begin to feel dizzy, sit down until you feel better. These effects can decrease once your body adjusts to the medicine.  Contact your doctor or health care professional right away if you have an erection that lasts longer than 4 hours or if it becomes painful. This may be a sign of a serious problem and must be treated  right away to prevent permanent damage.  If you are thinking of having cataract surgery, tell your eye surgeon that you have taken this medicine.  What side effects may I notice from receiving this medicine?  Side effects that you should report to your doctor or health care professional as soon as possible:    allergic reactions like skin rash or itching, hives, swelling of the lips, mouth, tongue, or throat    breathing problems    change in vision    feeling faint or lightheaded    irregular heartbeat    prolonged or painful erection    weakness  Side effects that usually do not require medical attention (report to your doctor or health care professional if they continue or are bothersome):    back pain    change in sex drive or performance    constipation, nausea or vomiting    cough    drowsy    runny or stuffy nose    trouble sleeping  This list may not describe all possible side effects. Call your doctor for medical advice about side effects. You may report side effects to FDA at 1-193-FDA-0496.  Where should I keep my medicine?  Keep out of the reach of children.  Store at room temperature between 15 and 30 degrees C (59 and 86 degrees F). Throw away any unused medicine after the expiration date.  NOTE:This sheet is a summary. It may not cover all possible information. If you have questions about this medicine, talk to your doctor, pharmacist, or health care provider. Copyright  2016 [employer]

## 2017-12-24 NOTE — NURSING NOTE
"Chief Complaint   Patient presents with     Kidney Problem     Patine complains of blood in urine and back pain       Initial /88 (BP Location: Left arm, Patient Position: Chair, Cuff Size: Adult Regular)  Pulse 104  Temp 97.5  F (36.4  C) (Oral)  Wt 143 lb 6.4 oz (65 kg)  SpO2 99%  BMI 21.18 kg/m2 Estimated body mass index is 21.18 kg/(m^2) as calculated from the following:    Height as of 1/17/17: 5' 9\" (1.753 m).    Weight as of this encounter: 143 lb 6.4 oz (65 kg).  Medication Reconciliation: complete       Bushra Velazquez    "

## 2017-12-26 ENCOUNTER — TELEPHONE (OUTPATIENT)
Dept: FAMILY MEDICINE | Facility: CLINIC | Age: 67
End: 2017-12-26

## 2017-12-26 NOTE — TELEPHONE ENCOUNTER
..Reason for Call:  Other     Detailed comments: Patient called said he was seen for a kidney stone on [facility]. He want to know he was giving a number for imaging and a kit for the stone, he is confused if he is waiting for the stone to pass or should he be scheduling for imaging.    Phone Number Patient can be reached at: Home number on file [phone] (home)    Best Time: anytime    Can we leave a detailed message on this number? YES    Call taken on 12/26/2017 at 9:48 AM by Oliver Verduzco

## 2017-12-26 NOTE — TELEPHONE ENCOUNTER
Pt said that had no blood in the urine since his visit.  He has a CT scan scheduled.  Encouraged to schedule urology apt. Pt agreed with a plan.  Analisa Vidal RN

## 2017-12-27 ENCOUNTER — DOCUMENTATION ONLY (OUTPATIENT)
Dept: FAMILY MEDICINE | Facility: CLINIC | Age: 67
End: 2017-12-27

## 2017-12-27 ENCOUNTER — RADIANT APPOINTMENT (OUTPATIENT)
Dept: CT IMAGING | Facility: CLINIC | Age: 67
End: 2017-12-27
Attending: FAMILY MEDICINE
Payer: COMMERCIAL

## 2017-12-27 DIAGNOSIS — R31.0 GROSS HEMATURIA: ICD-10-CM

## 2017-12-27 PROCEDURE — 74176 CT ABD & PELVIS W/O CONTRAST: CPT | Performed by: RADIOLOGY

## 2017-12-27 RX ORDER — IOPAMIDOL 755 MG/ML
88 INJECTION, SOLUTION INTRAVASCULAR ONCE
Status: ACTIVE | OUTPATIENT
Start: 2017-12-27

## 2017-12-27 NOTE — PROGRESS NOTES
CT abdomen/pelvis results informed, consistent with obstructing calculi in the distal right ureter along with moderate hydroureter and hydronephrosis. Urology appointment unavailable, we'll send him to ER. Patient understood and in agreement with the plan. All questions answered.      Results for orders placed or performed in visit on 12/27/17   CT Abdomen Pelvis w/o Contrast    Narrative    EXAMINATION: CT ABDOMEN PELVIS W/O CONTRAST, 12/27/2017 9:02 AM    TECHNIQUE:  Helical CT images from the lung bases through the pubic  symphysis were obtained  without IV contrast.     COMPARISON: None available.    HISTORY: right flank pain with hematuria,, >related to  nephrolithiasis; Gross hematuria    FINDINGS:    There is a 3 x 4 x 4 mm obstructing calculi in the distal right  ureter, just proximal to the ureterovesicular junction with moderate  upstream hydroureter and mild-to-moderate hydronephrosis. There is  mild periureteral and perinephric inflammatory stranding and slight  edematous enlargement of the right kidney. No additional discrete  renal or ureteral calculi. The bladder is mostly decompressed but  otherwise unremarkable. Multiple calcified pelvic phleboliths and  vascular calcifications noted.    Complete evaluation is limited secondary to lack of intravenous  contrast. Multiple hepatic hypodensities, the largest compatible with  simple cysts and the smaller too small to definitively characterize.  Within the confines of a noncontrast exam, the gallbladder, spleen,  pancreas, and adrenal glands are unremarkable. The bowel is without  dilatation or adjacent inflammatory change. Air-filled normal caliber  appendix. Colonic diverticulosis without evidence of diverticulitis.  Small mesenteric and retroperitoneal lymph nodes. No free  intraperitoneal air or fluid. The visualized lung bases demonstrate  mild dependent fibroatelectasis.    The osseous structures demonstrate demineralization and  degenerative  change. No acute abnormality or suspicious bony lesion.      Impression    IMPRESSION:     4 mm obstructing calculi in the distal right ureter, just proximal to  the ureterovesicular junction with moderate upstream  hydroureter/hydronephrosis as well as mild periureteral and  perinephric inflammatory stranding.    Findings were discussed with Dr. Wagner at 0925 hours on 12/27/2017.    MD Samuel CARRILLO MD  [facility]

## 2018-01-04 ENCOUNTER — TELEPHONE (OUTPATIENT)
Dept: FAMILY MEDICINE | Facility: CLINIC | Age: 68
End: 2018-01-04

## 2018-01-04 ENCOUNTER — PRE VISIT (OUTPATIENT)
Dept: UROLOGY | Facility: CLINIC | Age: 68
End: 2018-01-04

## 2018-01-04 NOTE — TELEPHONE ENCOUNTER
...Reason for Call:  Other     Detailed comments: Patient called said he has an upcoming appointment with urology, he said he has had no pain and he was told the stone could pass through. He said he has no specimen to bring the urologist and again feeling better no pain, so he need to know if he should bypass the appointment or still go. He does not want a bill if it is not necessary to go.  Phone Number Patient can be reached at: Home number on file [phone] (home)    Best Time: anytime    Can we leave a detailed message on this number? YES    Call taken on 1/4/2018 at 10:53 AM by Oliver Verduzco

## 2018-01-04 NOTE — TELEPHONE ENCOUNTER
Not an easy question to answer.  If he thinks the stone passed probably no need to go.  What we worry about is if it is stuck it can cause kidney damage.  But if he feels it is gone we can recheck kidneys at his upcoming physical.

## 2018-01-04 NOTE — TELEPHONE ENCOUNTER
PREVISIT INFORMATION                                                    Carlos Soares scheduled for future visit at [facility] specialty clinics.    Patient is scheduled to see Dr. Mace on 1/9/18 at 3pm  Reason for visit: Gross Hematuria  Referring provider: Dr. Wagner  Has patient seen previous specialist? No  Medical Records:  Available in chart.  Patient was previously seen at a [place] or [facility] facility.     REVIEW                                                      New patient packet mailed to patient: Yes  Medication reconciliation complete: No      PLAN/FOLLOW-UP NEEDED                                                      Patient Reminders Given:    Left message informing patient to bring an updated list of allergies, medications, pharmacy details and insurance information. Directed patient to come to the 2nd floor, check-in D for their appointment. Informed patient to call back if appointment needs to be cancelled or rescheduled at [phone].

## 2018-01-19 ENCOUNTER — OFFICE VISIT (OUTPATIENT)
Dept: FAMILY MEDICINE | Facility: CLINIC | Age: 68
End: 2018-01-19
Payer: COMMERCIAL

## 2018-01-19 ENCOUNTER — DOCUMENTATION ONLY (OUTPATIENT)
Dept: VASCULAR SURGERY | Facility: CLINIC | Age: 68
End: 2018-01-19

## 2018-01-19 VITALS
BODY MASS INDEX: 34.28 KG/M2 | TEMPERATURE: 97.5 F | WEIGHT: 174.6 LBS | OXYGEN SATURATION: 98 % | HEART RATE: 82 BPM | SYSTOLIC BLOOD PRESSURE: 118 MMHG | HEIGHT: 60 IN | DIASTOLIC BLOOD PRESSURE: 72 MMHG

## 2018-01-19 DIAGNOSIS — Z12.11 SPECIAL SCREENING FOR MALIGNANT NEOPLASMS, COLON: ICD-10-CM

## 2018-01-19 DIAGNOSIS — Z13.6 SCREENING FOR AAA (ABDOMINAL AORTIC ANEURYSM): ICD-10-CM

## 2018-01-19 DIAGNOSIS — Z00.00 ROUTINE GENERAL MEDICAL EXAMINATION AT A HEALTH CARE FACILITY: Primary | ICD-10-CM

## 2018-01-19 DIAGNOSIS — N20.0 KIDNEY STONE: ICD-10-CM

## 2018-01-19 DIAGNOSIS — F51.01 PRIMARY INSOMNIA: ICD-10-CM

## 2018-01-19 DIAGNOSIS — Z23 NEED FOR PROPHYLACTIC VACCINATION AND INOCULATION AGAINST INFLUENZA: ICD-10-CM

## 2018-01-19 DIAGNOSIS — Z12.5 SCREENING FOR PROSTATE CANCER: ICD-10-CM

## 2018-01-19 LAB
ANION GAP SERPL CALCULATED.3IONS-SCNC: 9 MMOL/L (ref 3–14)
BUN SERPL-MCNC: 11 MG/DL (ref 7–30)
CALCIUM SERPL-MCNC: 8.8 MG/DL (ref 8.5–10.1)
CHLORIDE SERPL-SCNC: 107 MMOL/L (ref 94–109)
CHOLEST SERPL-MCNC: 161 MG/DL
CO2 SERPL-SCNC: 26 MMOL/L (ref 20–32)
CREAT SERPL-MCNC: 0.96 MG/DL (ref 0.66–1.25)
GFR SERPL CREATININE-BSD FRML MDRD: 79 ML/MIN/1.7M2
GLUCOSE SERPL-MCNC: 90 MG/DL (ref 70–99)
HDLC SERPL-MCNC: 75 MG/DL
LDLC SERPL CALC-MCNC: 59 MG/DL
NONHDLC SERPL-MCNC: 86 MG/DL
POTASSIUM SERPL-SCNC: 4.3 MMOL/L (ref 3.4–5.3)
PSA SERPL-ACNC: 1.12 UG/L (ref 0–4)
SODIUM SERPL-SCNC: 142 MMOL/L (ref 133–144)
TRIGL SERPL-MCNC: 135 MG/DL

## 2018-01-19 PROCEDURE — 80048 BASIC METABOLIC PNL TOTAL CA: CPT | Performed by: FAMILY MEDICINE

## 2018-01-19 PROCEDURE — 90670 PCV13 VACCINE IM: CPT | Performed by: FAMILY MEDICINE

## 2018-01-19 PROCEDURE — 90662 IIV NO PRSV INCREASED AG IM: CPT | Performed by: FAMILY MEDICINE

## 2018-01-19 PROCEDURE — G0008 ADMIN INFLUENZA VIRUS VAC: HCPCS | Performed by: FAMILY MEDICINE

## 2018-01-19 PROCEDURE — G0009 ADMIN PNEUMOCOCCAL VACCINE: HCPCS | Performed by: FAMILY MEDICINE

## 2018-01-19 PROCEDURE — 99397 PER PM REEVAL EST PAT 65+ YR: CPT | Mod: 25 | Performed by: FAMILY MEDICINE

## 2018-01-19 PROCEDURE — 36415 COLL VENOUS BLD VENIPUNCTURE: CPT | Performed by: FAMILY MEDICINE

## 2018-01-19 PROCEDURE — 80061 LIPID PANEL: CPT | Performed by: FAMILY MEDICINE

## 2018-01-19 PROCEDURE — G0103 PSA SCREENING: HCPCS | Performed by: FAMILY MEDICINE

## 2018-01-19 RX ORDER — TRIAZOLAM 0.25 MG
TABLET ORAL
Qty: 30 TABLET | Refills: 5 | Status: SHIPPED | OUTPATIENT
Start: 2018-01-19 | End: 2018-11-20

## 2018-01-19 NOTE — LETTER
[address]  [address]  [phone]    January 19, 2018      Carlos Soares  [address] [facility] [facility] [address]      Dear Carlos,      Your lab work looks just fine.  Keep up the good work.       EDEN Heart MD

## 2018-01-19 NOTE — NURSING NOTE
"Chief Complaint   Patient presents with     Physical       Initial /72  Pulse 82  Temp 97.5  F (36.4  C) (Oral)  Ht 1.41 m (4' 7.51\")  Wt 79.2 kg (174 lb 9.6 oz)  SpO2 98%  BMI 39.84 kg/m2 Estimated body mass index is 39.84 kg/(m^2) as calculated from the following:    Height as of this encounter: 1.41 m (4' 7.51\").    Weight as of this encounter: 79.2 kg (174 lb 9.6 oz).  Medication Reconciliation: complete   Sally Ovalle CMA      "

## 2018-01-19 NOTE — PROGRESS NOTES
Injectable Influenza Immunization Documentation    1.  Is the person to be vaccinated sick today?   No    2. Does the person to be vaccinated have an allergy to a component   of the vaccine?   No  Egg Allergy Algorithm Link    3. Has the person to be vaccinated ever had a serious reaction   to influenza vaccine in the past?   No    4. Has the person to be vaccinated ever had Guillain-Barré syndrome?   No    Form completed by Sally Ovalle, CMA  Screening Questionnaire for Adult Immunization    Are you sick today?   No   Do you have allergies to medications, food, a vaccine component or latex?   No   Have you ever had a serious reaction after receiving a vaccination?   No   Do you have a long-term health problem with heart disease, lung disease, asthma, kidney disease, metabolic disease (e.g. diabetes), anemia, or other blood disorder?   No   Do you have cancer, leukemia, HIV/AIDS, or any other immune system problem?   No   In the past 3 months, have you taken medications that affect  your immune system, such as prednisone, other steroids, or anticancer drugs; drugs for the treatment of rheumatoid arthritis, Crohn s disease, or psoriasis; or have you had radiation treatments?   No   Have you had a seizure, or a brain or other nervous system problem?   No   During the past year, have you received a transfusion of blood or blood     products, or been given immune (gamma) globulin or antiviral drug?   No   For women: Are you pregnant or is there a chance you could become        pregnant during the next month?   No   Have you received any vaccinations in the past 4 weeks?   No     Immunization questionnaire answers were all negative.        Per orders of Dr. Holloway, injection of PCV 13 given by Sally Ovalle. Patient instructed to remain in clinic for 15 minutes afterwards, and to report any adverse reaction to me immediately.       Screening performed by Sally Ovalle on 1/19/2018 at 10:17  AM.

## 2018-01-19 NOTE — PROGRESS NOTES
Please mail results and note to patient:    Your lab work looks just fine.  Keep up the good work.  EDEN Heart MD

## 2018-01-19 NOTE — PROGRESS NOTES
SUBJECTIVE:   Carlos Soares is a 67 year old male who presents for Preventive Visit.      Are you in the first 12 months of your Medicare Part B coverage?  Yes,  Visual Acuity:  Right Eye: 20/50   Left Eye: 20/50  Both Eyes: 20/50    Healthy Habits:    Do you get at least three servings of calcium containing foods daily (dairy, green leafy vegetables, etc.)? yes    Amount of exercise or daily activities, outside of work: every other day(s) per week    Problems taking medications regularly No    Medication side effects: No    Have you had an eye exam in the past two years? yes    Do you see a dentist twice per year? yes    Do you have sleep apnea, excessive snoring or daytime drowsiness?no      Ability to successfully perform activities of daily living: Yes, no assistance needed    Home safety:  none identified     Hearing impairment: No    Fall risk:  Fallen 2 or more times in the past year?: No  Any fall with injury in the past year?: No          COGNITIVE SCREEN  1) Repeat 3 items (Banana, Sunrise, Chair)    2) Clock draw: NORMAL  3) 3 item recall: Recalls 3 objects  Results: 3 items recalled: COGNITIVE IMPAIRMENT LESS LIKELY    Mini-CogTM Copyright S Kyra. Licensed by the author for use in [facility]; reprinted with permission (soob@.[facility]). All rights reserved.                    Reviewed and updated as needed this visit by clinical staff  Tobacco  Allergies  Meds  Med Hx  Surg Hx  Fam Hx  Soc Hx        Reviewed and updated as needed this visit by Provider        Social History   Substance Use Topics     Smoking status: Current Every Day Smoker     Packs/day: 0.50     Years: 30.00     Types: Cigarettes     Smokeless tobacco: Never Used      Comment: 4-5 cigs daily     Alcohol use Yes      Comment: social       If you drink alcohol do you typically have >3 drinks per day or >7 drinks per week? No                        Today's PHQ-2 Score:   PHQ-2 ( 1999 Pfizer) 1/19/2018 1/17/2017   Q1:  Little interest or pleasure in doing things 0 0   Q2: Feeling down, depressed or hopeless 0 0   PHQ-2 Score 0 0         Do you feel safe in your environment - Yes    Do you have a Health Care Directive?: No: Advance care planning reviewed with patient; information given to patient to review.      Current providers sharing in care for this patient include: Patient Care Team:  Karley Heart MD as PCP - General (Family Practice)    The following health maintenance items are reviewed in Epic and correct as of today:  Health Maintenance   Topic Date Due     ADVANCE DIRECTIVE PLANNING Q5 YRS  11/18/2016     INFLUENZA VACCINE (SYSTEM ASSIGNED)  09/01/2017     FALL RISK ASSESSMENT  10/17/2017     PNEUMOCOCCAL (1 of 2 - PCV13) 10/17/2017     COLON CANCER SCREEN (SYSTEM ASSIGNED)  09/03/2018     LIPID SCREEN Q5 YR MALE (SYSTEM ASSIGNED)  01/17/2022     TETANUS IMMUNIZATION (SYSTEM ASSIGNED)  01/17/2027     AORTIC ANEURYSM SCREENING (SYSTEM ASSIGNED)  Completed     HEPATITIS C SCREENING  Completed     Labs reviewed in EPIC  BP Readings from Last 3 Encounters:   01/19/18 118/72   12/24/17 157/88   01/17/17 122/80    Wt Readings from Last 3 Encounters:   01/19/18 79.2 kg (174 lb 9.6 oz)   12/24/17 65 kg (143 lb 6.4 oz)   01/17/17 65.8 kg (145 lb 1.6 oz)         Here today for routine checkup  Reviewed interval history including recent kidney stone that he apparently passed    ROS:  C: NEGATIVE for fever, chills, change in weight  I: NEGATIVE for worrisome rashes, moles or lesions  E: NEGATIVE for vision changes or irritation  E/M: NEGATIVE for ear, mouth and throat problems  R: NEGATIVE for significant cough or SOB  B: NEGATIVE for masses, tenderness or discharge  CV: NEGATIVE for chest pain, palpitations or peripheral edema  GI: NEGATIVE for nausea, abdominal pain, heartburn, or change in bowel habits  : NEGATIVE for frequency, dysuria, or hematuria  M: NEGATIVE for significant arthralgias or myalgia  N: NEGATIVE  "for weakness, dizziness or paresthesias  E: NEGATIVE for temperature intolerance, skin/hair changes  H: NEGATIVE for bleeding problems  P: NEGATIVE for changes in mood or affect    OBJECTIVE:   /72  Pulse 82  Temp 97.5  F (36.4  C) (Oral)  Ht 1.41 m (4' 7.51\")  Wt 79.2 kg (174 lb 9.6 oz)  SpO2 98%  BMI 39.84 kg/m2 Estimated body mass index is 39.84 kg/(m^2) as calculated from the following:    Height as of this encounter: 1.41 m (4' 7.51\").    Weight as of this encounter: 79.2 kg (174 lb 9.6 oz).  EXAM:   GENERAL: healthy, alert and no distress  EYES: Eyes grossly normal to inspection, PERRL and conjunctivae and sclerae normal  HENT: ear canals and TM's normal, nose and mouth without ulcers or lesions  NECK: no adenopathy, no asymmetry, masses, or scars and thyroid normal to palpation  RESP: lungs clear to auscultation - no rales, rhonchi or wheezes  CV: regular rate and rhythm, normal S1 S2, no S3 or S4, no murmur, click or rub, no peripheral edema and peripheral pulses strong  ABDOMEN: soft, nontender, no hepatosplenomegaly, no masses and bowel sounds normal  RECTAL: normal sphincter tone, no rectal masses, prostate 2+ enlarged, smooth, nontender without nodules or masses   MS: no gross musculoskeletal defects noted, no edema  SKIN: no suspicious lesions or rashes  NEURO: Normal strength and tone, mentation intact and speech normal  PSYCH: mentation appears normal, affect normal/bright    ASSESSMENT / PLAN:   1. Routine general medical examination at a health care facility  PCV-13 and flu today  - Prostate spec antigen screen  - Lipid panel reflex to direct LDL Fasting    2. Kidney stone  Recheck renal function otherwise we can consider this a past issue  - Basic metabolic panel    3. Primary insomnia    - triazolam (HALCION) 0.25 MG tablet; TAKE 1 TABLET BY MOUTH EVERY NIGHT AT BEDTIME AS NEEDED  Dispense: 30 tablet; Refill: 5    4. Screening for prostate cancer    - Prostate spec antigen screen    5. " "Special screening for malignant neoplasms, colon    - GASTROENTEROLOGY ADULT REF PROCEDURE ONLY    End of Life Planning:  Patient currently has an advanced directive: No.  I have verified the patient's ablity to prepare an advanced directive/make health care decisions.  Literature was provided to assist patient in preparing an advanced directive.    COUNSELING:  Reviewed preventive health counseling, as reflected in patient instructions       Consider AAA screening for ages 65-75 and smoking history       Regular exercise       Healthy diet/nutrition       Vision screening       Colon cancer screening       Prostate cancer screening        Estimated body mass index is 39.84 kg/(m^2) as calculated from the following:    Height as of this encounter: 1.41 m (4' 7.51\").    Weight as of this encounter: 79.2 kg (174 lb 9.6 oz).       reports that he has been smoking Cigarettes.  He has a 15.00 pack-year smoking history. He has never used smokeless tobacco.  Tobacco Cessation Action Plan: Self help information given to patient    Appropriate preventive services were discussed with this patient, including applicable screening as appropriate for cardiovascular disease, diabetes, osteopenia/osteoporosis, and glaucoma.  As appropriate for age/gender, discussed screening for colorectal cancer, prostate cancer, breast cancer, and cervical cancer. Checklist reviewing preventive services available has been given to the patient.    Reviewed patients plan of care and provided an AVS. The Basic Care Plan (routine screening as documented in Health Maintenance) for Carlos meets the Care Plan requirement. This Care Plan has been established and reviewed with the Patient.    Counseling Resources:  ATP IV Guidelines  Pooled Cohorts Equation Calculator  Breast Cancer Risk Calculator  FRAX Risk Assessment  ICSI Preventive Guidelines  Dietary Guidelines for Americans, 2010  USDA's MyPlate  ASA Prophylaxis  Lung CA Screening    Karley Doshi " MD Sly  [facility]

## 2018-01-19 NOTE — MR AVS SNAPSHOT
After Visit Summary   1/19/2018    Carlos Soares    MRN: [id number]           Patient Information     Date Of Birth          1950        Visit Information        Provider Department      1/19/2018 9:20 AM Karley Heart MD [facility]        Today's Diagnoses     Routine general medical examination at a health care facility    -  1    Kidney stone        Primary insomnia        Screening for prostate cancer        Special screening for malignant neoplasms, colon        Screening for AAA (abdominal aortic aneurysm)          Care Instructions      Preventive Health Recommendations:   Male Ages 65 and over    Yearly exam:             See your health care provider every year in order to  o   Review health changes.   o   Discuss preventive care.    o   Review your medicines if your doctor has prescribed any.    Talk with your health care provider about whether you should have a test to screen for prostate cancer (PSA).    Every 3 years, have a diabetes test (fasting glucose). If you are at risk for diabetes, you should have this test more often.    Every 5 years, have a cholesterol test. Have this test more often if you are at risk for high cholesterol or heart disease.     Every 10 years, have a colonoscopy. Or, have a yearly FIT test (stool test). These exams will check for colon cancer.    Talk to with your health care provider about screening for Abdominal Aortic Aneurysm if you have a family history of AAA or have a history of smoking.    Shots:     Get a flu shot each year.     Get a tetanus shot every 10 years.     Talk to your doctor about your pneumonia vaccines. There are now two you should receive - Pneumovax (PPSV 23) and Prevnar (PCV 13).     Talk to your doctor about a shingles vaccine.     Talk to your doctor about the hepatitis B vaccine.  Nutrition:     Eat at least 5 servings of fruits and vegetables each day.     Eat whole-grain bread, whole-wheat pasta and  brown rice instead of white grains and rice.     Talk to your provider about Calcium and Vitamin D.   Lifestyle    Exercise for at least 150 minutes a week (30 minutes a day, 5 days a week). This will help you control your weight and prevent disease.     Limit alcohol to one drink per day.     No smoking.     Wear sunscreen to prevent skin cancer.     See your dentist every six months for an exam and cleaning.     See your eye doctor every 1 to 2 years to screen for conditions such as glaucoma, macular degeneration, cataracts, etc           Follow-ups after your visit        Additional Services     GASTROENTEROLOGY ADULT REF PROCEDURE ONLY       Last Lab Result: No results found for: CR  Body mass index is 39.84 kg/(m^2).     Needed:  No  Language:  English    Patient will be contacted to schedule procedure.     Please be aware that coverage of these services is subject to the terms and limitations of your health insurance plan.  Call member services at your health plan with any benefit or coverage questions.  Any procedures must be performed at a [place] facility OR coordinated by your clinic's referral office.    Please bring the following with you to your appointment:    (1) Any X-Rays, CTs or MRIs which have been performed.  Contact the facility where they were done to arrange for  prior to your scheduled appointment.    (2) List of current medications   (3) This referral request   (4) Any documents/labs given to you for this referral                  Follow-up notes from your care team     Return in about 1 year (around 1/19/2019).      Who to contact     If you have questions or need follow up information about today's clinic visit or your schedule please contact [facility] directly at [phone].  Normal or non-critical lab and imaging results will be communicated to you by MyChart, letter or phone within 4 business days after the clinic has received the results. If you do  "not hear from us within 7 days, please contact the clinic through [employer] or phone. If you have a critical or abnormal lab result, we will notify you by phone as soon as possible.  Submit refill requests through [employer] or call your pharmacy and they will forward the refill request to us. Please allow 3 business days for your refill to be completed.          Additional Information About Your Visit        Contract [employer] Information     [employer] lets you send messages to your doctor, view your test results, renew your prescriptions, schedule appointments and more. To sign up, go to [email or web address] . Click on \"Log in\" on the left side of the screen, which will take you to the Welcome page. Then click on \"Sign up Now\" on the right side of the page.     You will be asked to enter the access code listed below, as well as some personal information. Please follow the directions to create your username and password.     Your access code is: [email or web address]  Expires: 3/24/2018  3:09 PM     Your access code will  in 90 days. If you need help or a new code, please call your [facility] clinic or [phone].        Care EveryWhere ID     This is your Care EveryWhere ID. This could be used by other organizations to access your [facility] medical records  [id number]        Your Vitals Were     Pulse Temperature Height Pulse Oximetry BMI (Body Mass Index)       82 97.5  F (36.4  C) (Oral) 1.41 m (4' 7.51\") 98% 39.84 kg/m2        Blood Pressure from Last 3 Encounters:   18 118/72   17 157/88   17 122/80    Weight from Last 3 Encounters:   18 79.2 kg (174 lb 9.6 oz)   17 65 kg (143 lb 6.4 oz)   17 65.8 kg (145 lb 1.6 oz)              We Performed the Following     Abdominal Aortic Aneurysm Screening/Tracking     Basic metabolic panel     GASTROENTEROLOGY ADULT REF PROCEDURE ONLY     Lipid panel reflex to direct LDL Fasting     Prostate spec antigen screen          Today's Medication Changes       "    These changes are accurate as of: 1/19/18  9:58 AM.  If you have any questions, ask your nurse or doctor.               Stop taking these medicines if you haven't already. Please contact your care team if you have questions.     tamsulosin 0.4 MG capsule   Commonly known as:  FLOMAX   Stopped by:  Karley Heart MD                Where to get your medicines      Some of these will need a paper prescription and others can be bought over the counter.  Ask your nurse if you have questions.     Bring a paper prescription for each of these medications     triazolam 0.25 MG tablet                Primary Care Provider Office Phone # Fax #    Karley Heart [phone] [address] [facility] [address]        Equal Access to Services     [facility] : Hadii aad ku hadasho Soomaali, waaxda luqadaha, qaybta kaalmada adeegyada, waxay maximiliano beatty . So [facility] [phone].    ATENCIÓN: Si habla español, tiene a lamb disposición servicios gratuitos de asistencia lingüística. [facility] [phone].    We comply with applicable federal civil rights laws and [place] laws. We do not discriminate on the basis of race, color, national origin, age, disability, sex, sexual orientation, or gender identity.            Thank you!     Thank you for choosing [facility]  for your care. Our goal is always to provide you with excellent care. Hearing back from our patients is one way we can continue to improve our services. Please take a few minutes to complete the written survey that you may receive in the mail after your visit with us. Thank you!             Your Updated Medication List - Protect others around you: Learn how to safely use, store and throw away your medicines at www.disposemymeds.org.          This list is accurate as of: 1/19/18  9:58 AM.  Always use your most recent med list.                   Brand Name Dispense Instructions for use  Diagnosis    PSYLLIUM PO      Daily        tadalafil 10 MG tablet    CIALIS    10 tablet    Take 1 tablet by mouth daily as needed for erectile dysfunction.    ED (erectile dysfunction)       triazolam 0.25 MG tablet    HALCION    30 tablet    TAKE 1 TABLET BY MOUTH EVERY NIGHT AT BEDTIME AS NEEDED    Primary insomnia       vardenafil 20 MG tablet    LEVITRA    6 tablet    Take 1 tablet by mouth daily as needed for erectile dysfunction.    ED (erectile dysfunction)

## 2018-04-19 ENCOUNTER — SURGERY (OUTPATIENT)
Age: 68
End: 2018-04-19

## 2018-04-19 ENCOUNTER — HOSPITAL ENCOUNTER (OUTPATIENT)
Facility: AMBULATORY SURGERY CENTER | Age: 68
Discharge: HOME | End: 2018-04-19
Attending: INTERNAL MEDICINE | Admitting: INTERNAL MEDICINE
Payer: COMMERCIAL

## 2018-04-19 VITALS
RESPIRATION RATE: 16 BRPM | SYSTOLIC BLOOD PRESSURE: 114 MMHG | DIASTOLIC BLOOD PRESSURE: 74 MMHG | OXYGEN SATURATION: 94 % | TEMPERATURE: 98.3 F

## 2018-04-19 LAB — COLONOSCOPY: NORMAL

## 2018-04-19 PROCEDURE — G8918 PT W/O PREOP ORDER IV AB PRO: HCPCS

## 2018-04-19 PROCEDURE — 45380 COLONOSCOPY AND BIOPSY: CPT

## 2018-04-19 PROCEDURE — 88305 TISSUE EXAM BY PATHOLOGIST: CPT | Performed by: INTERNAL MEDICINE

## 2018-04-19 PROCEDURE — G8907 PT DOC NO EVENTS ON DISCHARG: HCPCS

## 2018-04-19 RX ORDER — LIDOCAINE 40 MG/G
CREAM TOPICAL
Status: DISCONTINUED | OUTPATIENT
Start: 2018-04-19 | End: 2018-04-20 | Stop reason: HOSPADM

## 2018-04-19 RX ORDER — FENTANYL CITRATE 50 UG/ML
INJECTION, SOLUTION INTRAMUSCULAR; INTRAVENOUS PRN
Status: DISCONTINUED | OUTPATIENT
Start: 2018-04-19 | End: 2018-04-19 | Stop reason: HOSPADM

## 2018-04-19 RX ORDER — ONDANSETRON 2 MG/ML
4 INJECTION INTRAMUSCULAR; INTRAVENOUS
Status: DISCONTINUED | OUTPATIENT
Start: 2018-04-19 | End: 2018-04-20 | Stop reason: HOSPADM

## 2018-04-19 RX ADMIN — FENTANYL CITRATE 100 MCG: 50 INJECTION, SOLUTION INTRAMUSCULAR; INTRAVENOUS at 09:44

## 2018-04-24 LAB — COPATH REPORT: NORMAL

## 2018-04-27 ENCOUNTER — TELEPHONE (OUTPATIENT)
Dept: FAMILY MEDICINE | Facility: CLINIC | Age: 68
End: 2018-04-27

## 2018-04-27 NOTE — TELEPHONE ENCOUNTER
Reason for Call:  Other results    Detailed comments: patient had polyp removed and he has not received the results if it was pre-cancerous or not and would like to get the results.    Phone Number Patient can be reached at: Home number on file [phone] (home)    Best Time: any    Can we leave a detailed message on this number? YES    Call taken on 4/27/2018 at 11:54 AM by Virginia Harrell

## 2018-04-30 NOTE — TELEPHONE ENCOUNTER
Looks like it was a hyperplastic polyp, which is not a precancerous polyp.  Should be getting a letter from the surgeon soon

## 2018-04-30 NOTE — TELEPHONE ENCOUNTER
Spoke with Carlos to relay provider message. He states understanding and has no further questions. Reports he also heard back from the surgeon office and they report they will be sending patient a letter in the mail. Closing encounter.     Carmen Hernandes RN

## 2018-11-20 DIAGNOSIS — F51.01 PRIMARY INSOMNIA: ICD-10-CM

## 2018-11-20 RX ORDER — TRIAZOLAM 0.25 MG
TABLET ORAL
Qty: 30 TABLET | Refills: 5 | Status: SHIPPED | OUTPATIENT
Start: 2018-11-20 | End: 2019-07-09

## 2018-11-20 NOTE — TELEPHONE ENCOUNTER
...Reason for Call:   prescription    Detailed comments: Patient called requesting a refill on TRIAZOLAM please refill if possible.    Phone Number Patient can be reached at: Home number on file [phone] (home)    Best Time: anytime    Can we leave a detailed message on this number? YES    Call taken on 11/20/2018 at 10:46 AM by Oliver Verduzco

## 2018-11-20 NOTE — TELEPHONE ENCOUNTER
Requested Prescriptions   Pending Prescriptions Disp Refills     triazolam (HALCION) 0.25 MG tablet  Last Written Prescription Date:  01/19/18  Last Fill Quantity: 30 tablet,  # refills: 5   Last office visit: 1/19/2018 with prescribing provider:  Dr. Heart   Future Office Visit:   Next 5 appointments (look out 90 days)     Jan 22, 2019  9:40 AM CST   PHYSICAL with Karley Heart MD   [facility] ([facility])    [address] [place] [id number] [phone]                 Routing refill request to provider for review/approval because:  Drug not on the [facility], [facility] or  Health refill protocol or controlled substance   30 tablet 5     Sig: TAKE 1 TABLET BY MOUTH EVERY NIGHT AT BEDTIME AS NEEDED    There is no refill protocol information for this order

## 2019-01-22 ENCOUNTER — OFFICE VISIT (OUTPATIENT)
Dept: FAMILY MEDICINE | Facility: CLINIC | Age: 69
End: 2019-01-22
Payer: COMMERCIAL

## 2019-01-22 VITALS
OXYGEN SATURATION: 100 % | HEIGHT: 69 IN | HEART RATE: 71 BPM | SYSTOLIC BLOOD PRESSURE: 126 MMHG | BODY MASS INDEX: 20.73 KG/M2 | WEIGHT: 140 LBS | TEMPERATURE: 97.5 F | DIASTOLIC BLOOD PRESSURE: 84 MMHG

## 2019-01-22 DIAGNOSIS — Z00.00 ROUTINE GENERAL MEDICAL EXAMINATION AT A HEALTH CARE FACILITY: Primary | ICD-10-CM

## 2019-01-22 DIAGNOSIS — Z23 NEED FOR PROPHYLACTIC VACCINATION AND INOCULATION AGAINST INFLUENZA: ICD-10-CM

## 2019-01-22 DIAGNOSIS — Z12.5 SCREENING FOR PROSTATE CANCER: ICD-10-CM

## 2019-01-22 DIAGNOSIS — F51.01 PRIMARY INSOMNIA: ICD-10-CM

## 2019-01-22 LAB
CHOLEST SERPL-MCNC: 177 MG/DL
GLUCOSE SERPL-MCNC: 90 MG/DL (ref 70–99)
HDLC SERPL-MCNC: 71 MG/DL
LDLC SERPL CALC-MCNC: 80 MG/DL
NONHDLC SERPL-MCNC: 106 MG/DL
PSA SERPL-ACNC: 1.21 UG/L (ref 0–4)
TRIGL SERPL-MCNC: 132 MG/DL

## 2019-01-22 PROCEDURE — 36415 COLL VENOUS BLD VENIPUNCTURE: CPT | Performed by: FAMILY MEDICINE

## 2019-01-22 PROCEDURE — 82947 ASSAY GLUCOSE BLOOD QUANT: CPT | Performed by: FAMILY MEDICINE

## 2019-01-22 PROCEDURE — 99397 PER PM REEVAL EST PAT 65+ YR: CPT | Mod: 25 | Performed by: FAMILY MEDICINE

## 2019-01-22 PROCEDURE — G0008 ADMIN INFLUENZA VIRUS VAC: HCPCS | Performed by: FAMILY MEDICINE

## 2019-01-22 PROCEDURE — 90662 IIV NO PRSV INCREASED AG IM: CPT | Performed by: FAMILY MEDICINE

## 2019-01-22 PROCEDURE — G0103 PSA SCREENING: HCPCS | Performed by: FAMILY MEDICINE

## 2019-01-22 PROCEDURE — 80061 LIPID PANEL: CPT | Performed by: FAMILY MEDICINE

## 2019-01-22 ASSESSMENT — MIFFLIN-ST. JEOR: SCORE: 1410.03

## 2019-01-22 ASSESSMENT — PAIN SCALES - GENERAL: PAINLEVEL: NO PAIN (0)

## 2019-01-22 NOTE — PATIENT INSTRUCTIONS
New shingles vaccination (Shingrix):  - Extremely effective  - Recommended age 50 and older for all adults, even if you have had     previous shingles vaccination  - Check first with insurance on coverage and location         Preventive Health Recommendations:     See your health care provider every year to    Review health changes.     Discuss preventive care.      Review your medicines if your doctor has prescribed any.      Talk with your health care provider about whether you should have a test to screen for prostate cancer (PSA).    Every 3 years, have a diabetes test (fasting glucose). If you are at risk for diabetes, you should have this test more often.    Every 5 years, have a cholesterol test. Have this test more often if you are at risk for high cholesterol or heart disease.     Every 10 years, have a colonoscopy. Or, have a yearly FIT test (stool test). These exams will check for colon cancer.    Talk to with your health care provider about screening for Abdominal Aortic Aneurysm if you have a family history of AAA or have a history of smoking.    Shots:     Get a flu shot each year.     Get a tetanus shot every 10 years.     Talk to your doctor about your pneumonia vaccines. There are now two you should receive - Pneumovax (PPSV 23) and Prevnar (PCV 13).     Talk to your pharmacist about a shingles vaccine.     Talk to your doctor about the hepatitis B vaccine.  Nutrition:     Eat at least 5 servings of fruits and vegetables each day.     Eat whole-grain bread, whole-wheat pasta and brown rice instead of white grains and rice.     Get adequate Calcium and Vitamin D.   Lifestyle    Exercise for at least 150 minutes a week (30 minutes a day, 5 days a week). This will help you control your weight and prevent disease.     Limit alcohol to one drink per day.     No smoking.     Wear sunscreen to prevent skin cancer.    See your dentist every six months for an exam and cleaning.    See your eye doctor every 1  to 2 years to screen for conditions such as glaucoma, macular degeneration, cataracts, etc.    Personalized Prevention Plan  You are due for the preventive services outlined below.  Your care team is available to assist you in scheduling these services.  If you have already completed any of these items, please share that information with your care team to update in your medical record.  Health Maintenance Due   Topic Date Due     Zoster (Chicken Pox) Vaccine (1 of 2) 10/17/2002     Discuss Advance Directive Planning  11/18/2016     Flu Vaccine (1) 09/01/2018     FALL RISK ASSESSMENT  01/19/2019     Depression Assessment 2 - yearly  01/19/2019

## 2019-01-22 NOTE — LETTER
[address]  [address]  [phone]    January 22, 2019      Carlos Soares  [address] [facility] [address]      Dear Carlos,    Your lab work looks just fine.  Keep up the good work.        EDEN Heatr MD

## 2019-01-22 NOTE — PROGRESS NOTES
"  SUBJECTIVE:   Carlos Soares is a 68 year old male who presents for Preventive Visit.      Are you in the first 12 months of your Medicare Part B coverage?  No    Physical Health:    In general, how would you rate your overall physical health? excellent    Outside of work, how many days during the week do you exercise? 4-5 days/week    Outside of work, approximately how many minutes a day do you exercise?15-30 minutes    If you drink alcohol do you typically have >3 drinks per day or >7 drinks per week? No    Do you usually eat at least 4 servings of fruit and vegetables a day, include whole grains & fiber and avoid regularly eating high fat or \"junk\" foods? NO 2-3    Do you have any problems taking medications regularly?  No    Do you have any side effects from medications? none    Needs assistance for the following daily activities: no assistance needed    Which of the following safety concerns are present in your home?  none identified     Hearing impairment: No    In the past 6 months, have you been bothered by leaking of urine? no    Mental Health:    In general, how would you rate your overall mental or emotional health? excellent  PHQ-2 Score:      Do you feel safe in your environment? Yes    Do you have a Health Care Directive? No: Advance care planning was reviewed with patient; patient declined at this time.    Additional concerns to address?      Fall risk:  Fallen 2 or more times in the past year?: No  Any fall with injury in the past year?: No    Cognitive Screenin) Repeat 3 items (Leader, Season, Table)    2) Clock draw: NORMAL  3) 3 item recall: Recalls 3 objects  Results: 3 items recalled: COGNITIVE IMPAIRMENT LESS LIKELY    Mini-CogTM Billy Rae. Licensed by the author for use in [facility]; reprinted with permission (luis@.[facility]). All rights reserved.      Do you have sleep apnea, excessive snoring or daytime drowsiness?: no            Reviewed and updated as needed " this visit by clinical staff  Tobacco  Allergies  Meds         Reviewed and updated as needed this visit by Provider        Social History     Tobacco Use     Smoking status: Current Every Day Smoker     Packs/day: 0.50     Years: 30.00     Pack years: 15.00     Types: Cigarettes     Smokeless tobacco: Never Used     Tobacco comment: 4-5 cigs daily   Substance Use Topics     Alcohol use: Yes     Comment: social                           Current providers sharing in care for this patient include:   Patient Care Team:  Karley Heart MD as PCP - General (Family Practice)  Karley Heart MD as PCP - Assigned PCP    The following health maintenance items are reviewed in Epic and correct as of today:  Health Maintenance   Topic Date Due     ZOSTER IMMUNIZATION (1 of 2) 10/17/2002     ADVANCE DIRECTIVE PLANNING Q5 YRS  11/18/2016     INFLUENZA VACCINE (1) 09/01/2018     FALL RISK ASSESSMENT  01/19/2019     PHQ-2 Q1 YR  01/19/2019     LIPID SCREEN Q5 YR MALE (SYSTEM ASSIGNED)  01/19/2023     DTAP/TDAP/TD IMMUNIZATION (3 - Td) 01/17/2027     COLON CANCER SCREEN (SYSTEM ASSIGNED)  04/19/2028     AORTIC ANEURYSM SCREENING (SYSTEM ASSIGNED)  Completed     HEPATITIS C SCREENING  Completed     IPV IMMUNIZATION  Aged Out     MENINGITIS IMMUNIZATION  Aged Out     Labs reviewed in EPIC  BP Readings from Last 3 Encounters:   01/22/19 126/84   04/19/18 114/74   01/19/18 118/72    Wt Readings from Last 3 Encounters:   01/22/19 63.5 kg (140 lb)   01/19/18 79.2 kg (174 lb 9.6 oz)   12/24/17 65 kg (143 lb 6.4 oz)         Here today for routine checkup  Doing well.  Reports no interval health concerns.      ROS:  CONSTITUTIONAL: NEGATIVE for fever, chills, change in weight  INTEGUMENTARY/SKIN: NEGATIVE for worrisome rashes, moles or lesions  EYES: NEGATIVE for vision changes or irritation  ENT/MOUTH: NEGATIVE for ear, mouth and throat problems  RESP: NEGATIVE for significant cough or SOB  BREAST: NEGATIVE for masses,  "tenderness or discharge  CV: NEGATIVE for chest pain, palpitations or peripheral edema  GI: NEGATIVE for nausea, abdominal pain, heartburn, or change in bowel habits  : NEGATIVE for frequency, dysuria, or hematuria  MUSCULOSKELETAL: NEGATIVE for significant arthralgias or myalgia  NEURO: NEGATIVE for weakness, dizziness or paresthesias  ENDOCRINE: NEGATIVE for temperature intolerance, skin/hair changes  HEME: NEGATIVE for bleeding problems  PSYCHIATRIC: NEGATIVE for changes in mood or affect    OBJECTIVE:   /84 (BP Location: Right arm, Patient Position: Chair, Cuff Size: Adult Regular)   Pulse 71   Temp 97.5  F (36.4  C) (Oral)   Ht 1.76 m (5' 9.29\")   Wt 63.5 kg (140 lb)   SpO2 100%   BMI 20.50 kg/m   Estimated body mass index is 20.5 kg/m  as calculated from the following:    Height as of this encounter: 1.76 m (5' 9.29\").    Weight as of this encounter: 63.5 kg (140 lb).  EXAM:   GENERAL: healthy, alert and no distress  EYES: Eyes grossly normal to inspection, PERRL and conjunctivae and sclerae normal  HENT: ear canals and TM's normal, nose and mouth without ulcers or lesions  NECK: no adenopathy, no asymmetry, masses, or scars and thyroid normal to palpation  RESP: lungs clear to auscultation - no rales, rhonchi or wheezes  CV: regular rate and rhythm, normal S1 S2, no S3 or S4, no murmur, click or rub, no peripheral edema and peripheral pulses strong  ABDOMEN: soft, nontender, no hepatosplenomegaly, no masses and bowel sounds normal  MS: no gross musculoskeletal defects noted, no edema  SKIN: no suspicious lesions or rashes  NEURO: Normal strength and tone, mentation intact and speech normal  PSYCH: mentation appears normal, affect normal/bright    Diagnostic Test Results:  none     ASSESSMENT / PLAN:   1. Routine general medical examination at a health care facility  Routine health maintenance up-to-date.  Discussed shingles vaccination  - Prostate spec antigen screen  - Lipid panel reflex to " "direct LDL Fasting  - Glucose    2. Primary insomnia  Doing well on his dosage of calcium.  We will continue    3. Screening for prostate cancer    - Prostate spec antigen screen    4. Need for prophylactic vaccination and inoculation against influenza        End of Life Planning:  Patient currently has an advanced directive: Patient declines    COUNSELING:  Reviewed preventive health counseling, as reflected in patient instructions       Regular exercise       Healthy diet/nutrition       Vision screening       Hearing screening       Colon cancer screening       Prostate cancer screening    BP Readings from Last 1 Encounters:   01/22/19 126/84     Estimated body mass index is 20.5 kg/m  as calculated from the following:    Height as of this encounter: 1.76 m (5' 9.29\").    Weight as of this encounter: 63.5 kg (140 lb).           reports that he has been smoking cigarettes.  He has a 15.00 pack-year smoking history. he has never used smokeless tobacco.  Tobacco Cessation Action Plan: Information offered: Patient not interested at this time    Appropriate preventive services were discussed with this patient, including applicable screening as appropriate for cardiovascular disease, diabetes, osteopenia/osteoporosis, and glaucoma.  As appropriate for age/gender, discussed screening for colorectal cancer, prostate cancer, breast cancer, and cervical cancer. Checklist reviewing preventive services available has been given to the patient.    Reviewed patients plan of care and provided an AVS. The Basic Care Plan (routine screening as documented in Health Maintenance) for Carlos meets the Care Plan requirement. This Care Plan has been established and reviewed with the Patient.    Counseling Resources:  ATP IV Guidelines  Pooled Cohorts Equation Calculator  Breast Cancer Risk Calculator  FRAX Risk Assessment  ICSI Preventive Guidelines  Dietary Guidelines for Americans, 2010  USDA's MyPlate  ASA Prophylaxis  Lung CA " Screening    Karley Heart MD  [facility]

## 2019-01-22 NOTE — PROGRESS NOTES
Injectable Influenza Immunization Documentation    1.  Is the person to be vaccinated sick today?   No    2. Does the person to be vaccinated have an allergy to a component   of the vaccine?   No  Egg Allergy Algorithm Link    3. Has the person to be vaccinated ever had a serious reaction   to influenza vaccine in the past?   No    4. Has the person to be vaccinated ever had Guillain-Barré syndrome?   No    Form completed by Maria Eugenia Morley MA on 1/22/2019 at 10:16 AM

## 2019-02-26 ENCOUNTER — OFFICE VISIT (OUTPATIENT)
Dept: FAMILY MEDICINE | Facility: CLINIC | Age: 69
End: 2019-02-26
Payer: COMMERCIAL

## 2019-02-26 VITALS
OXYGEN SATURATION: 96 % | TEMPERATURE: 97.5 F | DIASTOLIC BLOOD PRESSURE: 80 MMHG | HEART RATE: 63 BPM | SYSTOLIC BLOOD PRESSURE: 126 MMHG | BODY MASS INDEX: 20.59 KG/M2 | HEIGHT: 69 IN | WEIGHT: 139 LBS

## 2019-02-26 DIAGNOSIS — J01.00 SUBACUTE MAXILLARY SINUSITIS: Primary | ICD-10-CM

## 2019-02-26 PROCEDURE — 99213 OFFICE O/P EST LOW 20 MIN: CPT | Performed by: FAMILY MEDICINE

## 2019-02-26 RX ORDER — AMOXICILLIN 875 MG
875 TABLET ORAL 2 TIMES DAILY
Qty: 20 TABLET | Refills: 0 | Status: SHIPPED | OUTPATIENT
Start: 2019-02-26 | End: 2019-04-02

## 2019-02-26 ASSESSMENT — MIFFLIN-ST. JEOR: SCORE: 1405.49

## 2019-02-26 ASSESSMENT — PAIN SCALES - GENERAL: PAINLEVEL: NO PAIN (0)

## 2019-02-26 NOTE — PATIENT INSTRUCTIONS
If needed (for congestion, etc):    1) a nondrowsy antihistamine such as Claritin or Zyrtec    2) nasal saline to rinse the sinuses    3) short-term use of a nasal decongestant such as Afrin (no more than 1 week)

## 2019-02-26 NOTE — PROGRESS NOTES
"  SUBJECTIVE:   Carlos Soares is a 68 year old male who presents to clinic today for the following health issues:      Acute Illness   Acute illness concerns: Sinus   Onset: 2 months     Fever: no     Chills/Sweats: no     Headache (location?): no     Sinus Pressure:YES    Conjunctivitis:  no    Ear Pain: no    Rhinorrhea: YES- rare    Congestion: YES    Sore Throat: no      Cough: no    Wheeze: no     Decreased Appetite: no     Nausea: no     Vomiting: no     Diarrhea:  no     Dysuria/Freq.: no     Fatigue/Achiness: no     Sick/Strep Exposure: no      Therapies Tried and outcome: None     SUBJECTIVE:  For the above symptoms have been present for more than 2 months.  As background the patient is rarely ever sick.  And is not particularly ill with this, with no fever or chills or sense of illness.  But he has a pressure in behind his nose and a constant low-grade congestion.  Is not getting worse but it is not getting any better.  Just hanging on for a long time    Review of systems otherwise negative.  Past medical, family, and social history reviewed and updated in chart.    OBJECTIVE:  /80 (BP Location: Right arm, Patient Position: Chair, Cuff Size: Adult Large)   Pulse 63   Temp 97.5  F (36.4  C) (Oral)   Ht 1.76 m (5' 9.29\")   Wt 63 kg (139 lb)   SpO2 96%   BMI 20.35 kg/m    Alert, pleasant, upbeat, and in no apparent discomfort.  Ears normal. Throat and pharynx normal. Neck supple. No adenopathy or masses in the neck or supraclavicular regions. Sinuses non tender.  S1 and S2 normal, no murmurs, clicks, gallops or rubs. Regular rate and rhythm. Chest is clear; no wheezes or rales. No edema or JVD.  Past labs reviewed with the patient.     ASSESSMENT / PLAN:  (J01.00) Subacute maxillary sinusitis  (primary encounter diagnosis)  Comment: Discussed mechanism of action of the proposed medication, as well as potential effects, both good and bad.  Patient expressed understanding and agreed with treatment. "   Plan: amoxicillin (AMOXIL) 875 MG tablet            Follow up 2 weeks if not improving  S. Tico Heart MD    (Chart documentation completed in part with Dragon voice-recognition software.  Even though reviewed some grammatical, spelling, and word errors may remain.)

## 2019-03-07 ENCOUNTER — TELEPHONE (OUTPATIENT)
Dept: FAMILY MEDICINE | Facility: CLINIC | Age: 69
End: 2019-03-07

## 2019-03-07 DIAGNOSIS — J01.00 SUBACUTE MAXILLARY SINUSITIS: Primary | ICD-10-CM

## 2019-03-07 RX ORDER — LEVOFLOXACIN 500 MG/1
500 TABLET, FILM COATED ORAL DAILY
Qty: 10 TABLET | Refills: 0 | Status: SHIPPED | OUTPATIENT
Start: 2019-03-07 | End: 2019-04-02

## 2019-03-07 NOTE — TELEPHONE ENCOUNTER
Spoke with patient and reviewed medication instructions with patient and had patient do teach back so he understood. Gave him phone number to call for ENT and told him if not improving he should call them.   Gloria Mejía RN

## 2019-03-07 NOTE — TELEPHONE ENCOUNTER
Spoke with patient and he still has 2 tablets of the Amoxicillin prescription left that was prescribed for him when he was in the clinic on 2/26/19.    He is wondering why something else is being prescribed for him other than the amoxicillin and wondering if he should discontinue the amoxicillin if he is to take the Levofloxacin and how long he should wait before taking the levofloxacin?    Please review and advise.    Gloria Mejía RN

## 2019-03-07 NOTE — TELEPHONE ENCOUNTER
Continue amoxicillin until gone and wait three days before taking the levaquin.   I switched from the amoxicillin to the levaquin since things did not resolve with the amoxicillin - typically a 10 day course is all that is needed

## 2019-03-07 NOTE — TELEPHONE ENCOUNTER
Routing to provider to review and advise. Diagnosed last OV 2/26/19 with subacute maxillary sinusitis. Pharmacy is pended.     Sara Mckeon RN

## 2019-03-07 NOTE — TELEPHONE ENCOUNTER
Reason for Call:  Other prescription    Detailed comments: Patient was seen on 2/26/2019 and Dr Heart told patient if he only has a few pills left and not better to let him know and he can prescribe him more medication. Patient is about 60 % better, please call to advise.    Phone Number Patient can be reached at: Home number on file [phone] (home)    Best Time: any    Can we leave a detailed message on this number? YES    Call taken on 3/7/2019 at 10:38 AM by Virginia Harrell

## 2019-03-07 NOTE — TELEPHONE ENCOUNTER
Prescription for levaquin daily for 10 days sent to pharmacy  Follow up with ENT at [facility] ([phone]) if no improvement with med.   Advise to take probiotic with med

## 2019-03-19 ENCOUNTER — TELEPHONE (OUTPATIENT)
Dept: FAMILY MEDICINE | Facility: CLINIC | Age: 69
End: 2019-03-19

## 2019-03-19 NOTE — TELEPHONE ENCOUNTER
"Spoke with patient and he reports that his sinus infection symptoms have cleared up but he feels like he has \"nasal' symptoms. He denies congestion and other symptoms. He feels that the Levaquin has really helped clear up his sinus infection that he had. His only symptom is that he feels like he is talking through his nose and sounds like he has a cold. Patient has not tried the following recommendations from Dr. Heart from his last office visit on 2/26/19 so RN recommended trying #1 and #2.  RN recommended only doing #3 if he felt he had a lot of congestion and reminded him that Afrin is for short term use. This writer recommended doing #1 and #2 for about 1 week and if no improvement he should return to clinic. He verbalized understanding.  Gloria Mejía RN      "

## 2019-03-19 NOTE — TELEPHONE ENCOUNTER
Reason for Call:  Other     Detailed comments: Sinus infection concerns. Please call back and advise.    Phone Number Patient can be reached at: Home number on file [phone] (home)    Best Time: any    Can we leave a detailed message on this number? YES    Call taken on 3/19/2019 at 9:37 AM by Yuliana Vallejo

## 2019-03-19 NOTE — TELEPHONE ENCOUNTER
Reason for Call:  Other     Detailed comments: patient just wanted to make sure that the directions he was given for nasal symptoms,  are proper things to use when he does not have congestion,  patient was told to  take zyrtec or Claritin and use nasal saline.    Phone Number Patient can be reached at: Home number on file [phone] (home)    Best Time: any    Can we leave a detailed message on this number? YES    Call taken on 3/19/2019 at 11:18 AM by Virginia Harrell

## 2019-03-22 NOTE — TELEPHONE ENCOUNTER
Called and spoke with patient. Relayed provider message below regarding Zyrtec vs Zyrtec D. Patient understanding and agreed. Patient was given Zyrtec D by pharmacist at his pharmacy, says will stick with this and take as directed on package (one tab every 12 hours). No further questions, at this time.    Yue Hdz RN

## 2019-03-22 NOTE — TELEPHONE ENCOUNTER
Either would be fine.  The Zyrtec D has a decongestant in it - so can be a bit more drying.  But better at opening up sinuses.

## 2019-03-28 ENCOUNTER — TELEPHONE (OUTPATIENT)
Dept: FAMILY MEDICINE | Facility: CLINIC | Age: 69
End: 2019-03-28

## 2019-03-28 NOTE — TELEPHONE ENCOUNTER
Reason for Call:  Other prescription    Detailed comments: patient has tried and did not help amoxicillin, zyrtec, and  Levaquin did help some. Patient still has a nasally sound voice and on the left side of face has hearing that cuts out sometimes. He is about 90% there but wants to get to 100%. Asking for what else he can do?    Phone Number Patient can be reached at: Home number on file [phone] (home)    Best Time: any    Can we leave a detailed message on this number? YES    Call taken on 3/28/2019 at 11:11 AM by Virginia Harrell

## 2019-03-28 NOTE — TELEPHONE ENCOUNTER
Per OV documentation from 2/26/19, patient was to return to clinic for an office visit if symptoms were not resolved.     Team, please assist patient with scheduling an office visit for evaluation of ongoing symptoms.     Carmen Hernandes RN

## 2019-04-02 ENCOUNTER — ANCILLARY PROCEDURE (OUTPATIENT)
Dept: GENERAL RADIOLOGY | Facility: CLINIC | Age: 69
End: 2019-04-02
Attending: FAMILY MEDICINE
Payer: COMMERCIAL

## 2019-04-02 ENCOUNTER — OFFICE VISIT (OUTPATIENT)
Dept: FAMILY MEDICINE | Facility: CLINIC | Age: 69
End: 2019-04-02
Payer: COMMERCIAL

## 2019-04-02 VITALS
DIASTOLIC BLOOD PRESSURE: 82 MMHG | HEART RATE: 72 BPM | SYSTOLIC BLOOD PRESSURE: 124 MMHG | BODY MASS INDEX: 20.44 KG/M2 | OXYGEN SATURATION: 98 % | TEMPERATURE: 97.5 F | WEIGHT: 138 LBS | HEIGHT: 69 IN

## 2019-04-02 DIAGNOSIS — J32.9 CHRONIC SINUSITIS, UNSPECIFIED LOCATION: Primary | ICD-10-CM

## 2019-04-02 DIAGNOSIS — J32.9 CHRONIC SINUSITIS, UNSPECIFIED LOCATION: ICD-10-CM

## 2019-04-02 PROCEDURE — 99214 OFFICE O/P EST MOD 30 MIN: CPT | Performed by: FAMILY MEDICINE

## 2019-04-02 PROCEDURE — 70220 X-RAY EXAM OF SINUSES: CPT | Mod: FY

## 2019-04-02 RX ORDER — LEVOFLOXACIN 500 MG/1
500 TABLET, FILM COATED ORAL DAILY
Qty: 10 TABLET | Refills: 0 | Status: SHIPPED | OUTPATIENT
Start: 2019-04-02 | End: 2019-05-01

## 2019-04-02 ASSESSMENT — MIFFLIN-ST. JEOR: SCORE: 1400.94

## 2019-04-02 NOTE — PROGRESS NOTES
"  SUBJECTIVE:   Carlos Soares is a 68 year old male who presents to clinic today for the following health issues:      Acute Illness Follow-Up   Acute illness concerns: Sinus, patient states that he has been improving but symptoms still persist.   Onset: 3 months     Fever: no     Chills/Sweats: no    Headache (location?): no    Sinus Pressure:YES    Conjunctivitis:  no    Ear Pain: no    Rhinorrhea: no    Congestion: YES    Sore Throat: no     Cough: no    Wheeze: no    Decreased Appetite: no    Nausea: no    Vomiting: no    Diarrhea:  no    Dysuria/Freq.: no    Fatigue/Achiness: no    Sick/Strep Exposure: no     Therapies Tried and outcome: Levaquin with relief but Amoxicillin with no relief     SUBJECTIVE:  Here today for the above symptomatology.  We have been dealing with ongoing sinusitis symptoms for a few months now.  Original course of amoxicillin did nothing.  Changed to Levaquin and this seemed to improve things 75+ percent.  But he was still having some congestion and I suggested nasal saline rinses and antihistamines but those really have not helped either.  He no longer feels sick.  No fever or chills and only little bit of postnasal drip when he lays down.  But he still feels a little bit of sinus pressure and his voice is nasally.  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    Review of systems otherwise negative.  Past medical, family, and social history reviewed and updated in chart.    OBJECTIVE:  /82 (BP Location: Right arm, Patient Position: Chair, Cuff Size: Adult Regular)   Pulse 72   Temp 97.5  F (36.4  C) (Oral)   Ht 1.76 m (5' 9.29\")   Wt 62.6 kg (138 lb)   SpO2 98%   BMI 20.21 kg/m    Alert, pleasant, upbeat, and in no apparent discomfort.  Ears normal. Throat and pharynx normal. Neck supple. No adenopathy or masses in the neck or supraclavicular regions. Sinuses non tender.  S1 and S2 normal, no murmurs, clicks, gallops or rubs. Regular rate and rhythm. " Chest is clear; no wheezes or rales. No edema or JVD.  Past labs reviewed with the patient.   XRAY - my independent reading of the films showed basically clear sinuses with only a slight bit of opacification in the right maxillary sinus    ASSESSMENT / PLAN:  (J32.9) Chronic sinusitis, unspecified location  (primary encounter diagnosis)  Comment: Long discussion about the situation.  For the most part he has improved but not fully resolved.  We agreed to try one more course of Levaquin and if this does not fully resolve the issue I would favor getting him in with ENT for nasopharyngoscopy  Plan: XR Sinus Complete G/E 3 Views, levofloxacin         (LEVAQUIN) 500 MG tablet            Follow up 1 week with progress  S. Tico Heart MD    (Chart documentation completed in part with Dragon voice-recognition software.  Even though reviewed some grammatical, spelling, and word errors may remain.)

## 2019-04-10 ENCOUNTER — TELEPHONE (OUTPATIENT)
Dept: FAMILY MEDICINE | Facility: CLINIC | Age: 69
End: 2019-04-10

## 2019-04-10 NOTE — TELEPHONE ENCOUNTER
"Called and spoke with patient.     Continued sinus problems. Overall symptoms have resolved, but patient still has this nasal sound to voice. Can't seem to get rid of this. Was noted to have this with last apt with PCP on 4/2/19. Denies congestion or phlegm, no pressure or pain in sinuses, no PND. Just continued \"nasal sound\". Clearly audible in voice when speaking with patient.   Per provider documentation from 4/2/19 OV:  ASSESSMENT / PLAN:  (J32.9) Chronic sinusitis, unspecified location  (primary encounter diagnosis)  Comment: Long discussion about the situation.  For the most part he has improved but not fully resolved.  We agreed to try one more course of Levaquin and if this does not fully resolve the issue I would favor getting him in with ENT for nasopharyngoscopy    Referral already in system to see ENT at [facility]). Writer advised this would be next step if still having issues or concerns. Feel would be a good suggestion at this point. Patient agreed with plan. Scheduling line to [facility] clinic given to set up consult with ENT. Did mention to patient that if having trouble getting in with [facility], call clinic back and can have referral put in to see Dr. Nava with [facility] or [facility] locations. Patient agreed with this plan. No further questions at this time.    Yue Hdz RN      "

## 2019-04-10 NOTE — TELEPHONE ENCOUNTER
Reason for call:  Patient reporting a symptom    Symptom or request: sinus problem he is better but still has a nasal sound    Duration (how long have symptoms been present): weeks    Have you been treated for this before? Yes    Additional comments: Please call back and advise.    Phone Number patient can be reached at:  Home number on file [phone] (home)    Best Time:  any    Can we leave a detailed message on this number:  YES    Call taken on 4/10/2019 at 9:27 AM by Yuliana Vallejo

## 2019-04-15 ENCOUNTER — OFFICE VISIT (OUTPATIENT)
Dept: OTOLARYNGOLOGY | Facility: CLINIC | Age: 69
End: 2019-04-15
Attending: PHYSICIAN ASSISTANT
Payer: COMMERCIAL

## 2019-04-15 DIAGNOSIS — R49.9 CHANGE IN VOICE: Primary | ICD-10-CM

## 2019-04-15 PROCEDURE — 99203 OFFICE O/P NEW LOW 30 MIN: CPT | Performed by: OTOLARYNGOLOGY

## 2019-04-15 ASSESSMENT — PAIN SCALES - GENERAL: PAINLEVEL: NO PAIN (0)

## 2019-04-15 NOTE — LETTER
4/15/2019         RE: Carlos Soares  [address] [address]        Dear Colleague,    Thank you for referring your patient, Carlos Soares, to the [facility]. Please see a copy of my visit note below.    Otolaryngology Adult Consultation    Patient: Carlos Soares  : 1950        HPI:  Carlos Soares is a 68 year old male seen today in the Otolaryngology Clinic for nasally-sounding voice.  Patient reports that couple months ago he had what he thought was a head cold that potentially turn into a sinus infection.  He was having nasal congestion sinus pressure and drainage.  He was treated with 2 rounds of Levaquin which he reports did improve a lot of his symptoms such that currently today he feels pretty normal except that his voice still sounds quite nasally to him.  He has no nasal congestion currently.  He has no issues with drainage or pain.  His voice does sound nasal to him.  He has had other people say that his voice sounds improved.  He does like to seeing and he also notices that change in his singing voice.  He denies any hoarseness.    Medications:  Current Outpatient Rx   Medication Sig Dispense Refill     levofloxacin (LEVAQUIN) 500 MG tablet Take 1 tablet (500 mg) by mouth daily 10 tablet 0     PSYLLIUM OR Daily       tadalafil (CIALIS) 10 MG tablet Take 1 tablet by mouth daily as needed for erectile dysfunction. (Patient not taking: Reported on 2018) 10 tablet 11     triazolam (HALCION) 0.25 MG tablet TAKE 1 TABLET BY MOUTH EVERY NIGHT AT BEDTIME AS NEEDED 30 tablet 5     vardenafil (LEVITRA) 20 MG tablet Take 1 tablet by mouth daily as needed for erectile dysfunction. (Patient not taking: Reported on 2018) 6 tablet 11       Allergies: Patient has no known allergies.     PMH:  Past Medical History:   Diagnosis Date     Advanced directives, counseling/discussion 2011     Diverticulosis      ED (erectile dysfunction) 10/22/2010      Hemorrhoids      Insomnia      Tobacco abuse 11/18/2011       PSH:  Past Surgical History:   Procedure Laterality Date     C DESTRUCTION HEMORRHOIDS,INT/EXTERN  2008, 2009     COLONOSCOPY N/A 4/19/2018    Procedure: COMBINED COLONOSCOPY, SINGLE OR MULTIPLE BIOPSY/POLYPECTOMY BY BIOPSY;;  Surgeon: Duane, William Charles, MD;  Location: [facility] OR     COLONOSCOPY WITH CO2 INSUFFLATION N/A 4/19/2018    Procedure: COLONOSCOPY WITH CO2 INSUFFLATION;  Karley Heart MD/Special screening for malignant neoplasms, colon/Colonoscopy/bmi 39.84/[facility] Fax#[phone];  Surgeon: Duane, William Charles, MD;  Location: [facility] OR     HC REPAIR SLIDING INGUINAL HERNIA  12/28/07       FH:  Family History   Problem Relation Age of Onset     Cerebrovascular Disease Father         SH:  Social History     Tobacco Use     Smoking status: Current Every Day Smoker     Packs/day: 0.50     Years: 30.00     Pack years: 15.00     Types: Cigarettes     Smokeless tobacco: Never Used     Tobacco comment: 4-5 cigs daily   Substance Use Topics     Alcohol use: Yes     Comment: social     Drug use: No       Review of Systems  No flowsheet data found.    Physical Exam:    GEN:  The patient is alert, oriented and in no acute distress.  HEAD:  Head, face scalp is grossly normal.  NOSE:  External nose is straight, skin is normal.                Intranasal exam (anterior rhinoscopy) reveals normal moist mucosa, turbinate                  tissue without edema, erythema or crusting.  Septum mainly in the midline.  ORAL:  Oral cavity shows healthy mucosa with out ulceration, masses or other lesions                involving the tongue, palate, buccal mucosa, floor of mouth or gingiva.                 NECK:   Neck is without adenopathy, thyroid or salivary gland masses.  PUL: normal work of breathing; no stridor  CV: RRR; no peripheral edema  M/S: normal muscle tone     Imaging:  Sinus X-ray 4/2/19: normal    Assessment/Plan: Patient  presents with a complaint of nasal sound to his voice.  During our conversation in clinic I did not detect any significant nasal congestion sound or air escape with his voice.  His voice sounded quite clear and strong to me.  On examination he is a very widely patent nasal passages bilaterally.  I discussed with the patient that really the only options he has are observation and seeing if it continues to improve on its own or to work with 1 of our speech therapist for voice therapy.  I did be sure him that I did not see anything unusual on his exam today and I certainly do not see any sign of residual infection.  At this time the patient is going to elect observation.    I spent a total of 35 minutes face-to-face with Carlos Soares during today's office visit.  Over 50% of this time was spent counseling the patient on and/or coordinating care as documented in my assessment and plan.        Again, thank you for allowing me to participate in the care of your patient.        Sincerely,        Deborah Pennington MD

## 2019-04-15 NOTE — PROGRESS NOTES
Otolaryngology Adult Consultation    Patient: Carlos Soares  : 1950        HPI:  Carlos Soares is a 68 year old male seen today in the Otolaryngology Clinic for nasally-sounding voice.  Patient reports that couple months ago he had what he thought was a head cold that potentially turn into a sinus infection.  He was having nasal congestion sinus pressure and drainage.  He was treated with 2 rounds of Levaquin which he reports did improve a lot of his symptoms such that currently today he feels pretty normal except that his voice still sounds quite nasally to him.  He has no nasal congestion currently.  He has no issues with drainage or pain.  His voice does sound nasal to him.  He has had other people say that his voice sounds improved.  He does like to seeing and he also notices that change in his singing voice.  He denies any hoarseness.    Medications:  Current Outpatient Rx   Medication Sig Dispense Refill     levofloxacin (LEVAQUIN) 500 MG tablet Take 1 tablet (500 mg) by mouth daily 10 tablet 0     PSYLLIUM OR Daily       tadalafil (CIALIS) 10 MG tablet Take 1 tablet by mouth daily as needed for erectile dysfunction. (Patient not taking: Reported on 2018) 10 tablet 11     triazolam (HALCION) 0.25 MG tablet TAKE 1 TABLET BY MOUTH EVERY NIGHT AT BEDTIME AS NEEDED 30 tablet 5     vardenafil (LEVITRA) 20 MG tablet Take 1 tablet by mouth daily as needed for erectile dysfunction. (Patient not taking: Reported on 2018) 6 tablet 11       Allergies: Patient has no known allergies.     PMH:  Past Medical History:   Diagnosis Date     Advanced directives, counseling/discussion 2011     Diverticulosis      ED (erectile dysfunction) 10/22/2010     Hemorrhoids      Insomnia      Tobacco abuse 2011       PSH:  Past Surgical History:   Procedure Laterality Date     C DESTRUCTION HEMORRHOIDS,INT/EXTERN  ,      COLONOSCOPY N/A 2018    Procedure: COMBINED COLONOSCOPY, SINGLE OR  MULTIPLE BIOPSY/POLYPECTOMY BY BIOPSY;;  Surgeon: Duane, William Charles, MD;  Location: [facility] OR     COLONOSCOPY WITH CO2 INSUFFLATION N/A 4/19/2018    Procedure: COLONOSCOPY WITH CO2 INSUFFLATION;  Karley Heart MD/Special screening for malignant neoplasms, colon/Colonoscopy/bmi 39.84/[facility] Fax#[phone];  Surgeon: Duane, William Charles, MD;  Location: [facility] OR     HC REPAIR SLIDING INGUINAL HERNIA  12/28/07       FH:  Family History   Problem Relation Age of Onset     Cerebrovascular Disease Father         SH:  Social History     Tobacco Use     Smoking status: Current Every Day Smoker     Packs/day: 0.50     Years: 30.00     Pack years: 15.00     Types: Cigarettes     Smokeless tobacco: Never Used     Tobacco comment: 4-5 cigs daily   Substance Use Topics     Alcohol use: Yes     Comment: social     Drug use: No       Review of Systems  No flowsheet data found.    Physical Exam:    GEN:  The patient is alert, oriented and in no acute distress.  HEAD:  Head, face scalp is grossly normal.  NOSE:  External nose is straight, skin is normal.                Intranasal exam (anterior rhinoscopy) reveals normal moist mucosa, turbinate                  tissue without edema, erythema or crusting.  Septum mainly in the midline.  ORAL:  Oral cavity shows healthy mucosa with out ulceration, masses or other lesions                involving the tongue, palate, buccal mucosa, floor of mouth or gingiva.                 NECK:   Neck is without adenopathy, thyroid or salivary gland masses.  PUL: normal work of breathing; no stridor  CV: RRR; no peripheral edema  M/S: normal muscle tone     Imaging:  Sinus X-ray 4/2/19: normal    Assessment/Plan: Patient presents with a complaint of nasal sound to his voice.  During our conversation in clinic I did not detect any significant nasal congestion sound or air escape with his voice.  His voice sounded quite clear and strong to me.  On examination he is a very  widely patent nasal passages bilaterally.  I discussed with the patient that really the only options he has are observation and seeing if it continues to improve on its own or to work with 1 of our speech therapist for voice therapy.  I did be sure him that I did not see anything unusual on his exam today and I certainly do not see any sign of residual infection.  At this time the patient is going to elect observation.    I spent a total of 35 minutes face-to-face with Carlos Soares during today's office visit.  Over 50% of this time was spent counseling the patient on and/or coordinating care as documented in my assessment and plan.

## 2019-04-15 NOTE — NURSING NOTE
Carlos Soares's goals for this visit include:   Chief Complaint   Patient presents with     Sinus Problem     1 month, post-antibiotics       He requests these members of his care team be copied on today's visit information: Karley Heart      PCP: Karley Heart    Referring Provider:  Kortney Hunter PA-C  [address] [place] [address]    There were no vitals taken for this visit.    Do you need any medication refills at today's visit? No    Dahiana Mendez RN

## 2019-04-23 ENCOUNTER — TELEPHONE (OUTPATIENT)
Dept: FAMILY MEDICINE | Facility: CLINIC | Age: 69
End: 2019-04-23

## 2019-04-23 DIAGNOSIS — R49.0 HOARSENESS: Primary | ICD-10-CM

## 2019-04-23 RX ORDER — PREDNISONE 20 MG/1
TABLET ORAL
Qty: 15 TABLET | Refills: 0 | Status: SHIPPED | OUTPATIENT
Start: 2019-04-23 | End: 2019-05-01

## 2019-04-23 NOTE — TELEPHONE ENCOUNTER
The one thing we could try is a course of an oral steroid to see if that helps - I will send that in.

## 2019-04-23 NOTE — TELEPHONE ENCOUNTER
Called and spoke with patient and reviewed providers message with him. Confirmed where medication was sent.    He is wondering if he should continue with the cetirizine and mucinex ?  He does not have chest congestion or mucous.  He does have a runny nose occasionally while being on the cetirizine.  He does not feel dried out while taking the cetirizine.      Provider please review and advise.    Gloria Mejía RN

## 2019-04-23 NOTE — TELEPHONE ENCOUNTER
Spoke with patient on the phone and reviewed providers recommendation with him and he verbalized understanding.  Gloria Mejía RN

## 2019-04-23 NOTE — TELEPHONE ENCOUNTER
Patient called regarding the ENT appointment is was at, they told him to either live with they nasally voice or go to speech therapy, wondering if there is another option, is not happy with the diagnosis. Please call back regarding.    [phone] - Ok to leave a detailed message

## 2019-05-01 ENCOUNTER — TELEPHONE (OUTPATIENT)
Dept: FAMILY MEDICINE | Facility: CLINIC | Age: 69
End: 2019-05-01

## 2019-05-01 DIAGNOSIS — J32.9 CHRONIC SINUSITIS, UNSPECIFIED LOCATION: ICD-10-CM

## 2019-05-01 DIAGNOSIS — R49.0 HOARSENESS: ICD-10-CM

## 2019-05-01 RX ORDER — PREDNISONE 20 MG/1
40 TABLET ORAL DAILY
Qty: 20 TABLET | Refills: 0 | Status: SHIPPED | OUTPATIENT
Start: 2019-05-01 | End: 2019-05-17

## 2019-05-01 RX ORDER — LEVOFLOXACIN 500 MG/1
500 TABLET, FILM COATED ORAL DAILY
Qty: 10 TABLET | Refills: 0 | Status: SHIPPED | OUTPATIENT
Start: 2019-05-01 | End: 2019-05-17

## 2019-05-01 NOTE — TELEPHONE ENCOUNTER
Reason for Call:  Other prescription    Detailed comments: Pt has been on prednisone treating sinus  Infection and so far Pt has been feeling quite well and feeling 80%. It has been the best he has been in a couple of months but still feels sinus cavity drainage and a nasally voice during the evening toward at night.  He would like to know if Dr. Heart could either prescribe another medication to alleviate symptoms or stay on current treatment.     Phone Number Patient can be reached at: Home number on file [phone] (home)    Best Time: anytime    Can we leave a detailed message on this number? YES    Call taken on 5/1/2019 at 10:35 AM by Otto Bonilla

## 2019-05-17 ENCOUNTER — OFFICE VISIT (OUTPATIENT)
Dept: FAMILY MEDICINE | Facility: CLINIC | Age: 69
End: 2019-05-17
Payer: COMMERCIAL

## 2019-05-17 VITALS
TEMPERATURE: 98.2 F | SYSTOLIC BLOOD PRESSURE: 134 MMHG | WEIGHT: 136 LBS | DIASTOLIC BLOOD PRESSURE: 90 MMHG | HEART RATE: 99 BPM | BODY MASS INDEX: 20.14 KG/M2 | HEIGHT: 69 IN | OXYGEN SATURATION: 98 %

## 2019-05-17 DIAGNOSIS — J32.9 CHRONIC SINUSITIS, UNSPECIFIED LOCATION: ICD-10-CM

## 2019-05-17 DIAGNOSIS — R49.0 HOARSENESS: ICD-10-CM

## 2019-05-17 PROBLEM — N40.1 BENIGN PROSTATIC HYPERPLASIA WITH URINARY FREQUENCY: Status: ACTIVE | Noted: 2019-05-17

## 2019-05-17 PROBLEM — R35.0 BENIGN PROSTATIC HYPERPLASIA WITH URINARY FREQUENCY: Status: ACTIVE | Noted: 2019-05-17

## 2019-05-17 PROCEDURE — 99214 OFFICE O/P EST MOD 30 MIN: CPT | Performed by: FAMILY MEDICINE

## 2019-05-17 RX ORDER — LEVOFLOXACIN 500 MG/1
500 TABLET, FILM COATED ORAL DAILY
Qty: 21 TABLET | Refills: 1 | Status: SHIPPED | OUTPATIENT
Start: 2019-05-17 | End: 2019-06-10

## 2019-05-17 RX ORDER — PREDNISONE 20 MG/1
TABLET ORAL
Qty: 28 TABLET | Refills: 1 | Status: SHIPPED | OUTPATIENT
Start: 2019-05-17 | End: 2019-08-20

## 2019-05-17 ASSESSMENT — MIFFLIN-ST. JEOR: SCORE: 1391.87

## 2019-05-17 NOTE — PROGRESS NOTES
"  SUBJECTIVE:   Carlos Soares is a 68 year old male who presents to clinic today for the following   health issues:        Acute Illness   Acute illness concerns: Sinus problem   Onset: 4.5 months     Fever: no    Chills/Sweats: no    Headache (location?): no    Sinus Pressure:YES    Conjunctivitis:  no    Ear Pain: no    Rhinorrhea: YES- but has improved     Congestion: no     Sore Throat: no     Cough: no    Wheeze: no    Decreased Appetite: no    Nausea: no    Vomiting: no    Diarrhea:  no    Dysuria/Freq.: no    Fatigue/Achiness: no    Sick/Strep Exposure: no     Therapies Tried and outcome: Predinose with temporary relief, once patient was out of Prednisone symptoms came back     SUBJECTIVE:  Here today with the above.  Patient is well-known to me and we have gone back and forth through my chart.  Has had recurrent sinusitis symptoms and even had him see ENT.  A partial examination was done and they did not feel there is anything significant going on.  Patient continues to have congestion and postnasal drip.  He is responding to antibiotics and by the end of a 10-day course he is feeling about 90% better, but as soon as he stops the symptoms return.  With each successive course he is getting gradually better and said that the best he felt was a combination of prednisone and Levaquin.  No side effects other than a little bit of agitation.  No fevers or chills but symptoms as above.    Review of systems otherwise negative.  Past medical, family, and social history reviewed and updated in chart.    OBJECTIVE:  /90 (BP Location: Right arm, Patient Position: Chair, Cuff Size: Adult Regular)   Pulse 99   Temp 98.2  F (36.8  C) (Oral)   Ht 1.76 m (5' 9.29\")   Wt 61.7 kg (136 lb)   SpO2 98%   BMI 19.92 kg/m    Alert, pleasant, upbeat, and in no apparent discomfort.  Ears normal. Throat and pharynx normal. Neck supple. No adenopathy or masses in the neck or supraclavicular regions. Sinuses non tender.  S1 " and S2 normal, no murmurs, clicks, gallops or rubs. Regular rate and rhythm. Chest is clear; no wheezes or rales. No edema or JVD.  Past labs reviewed with the patient.     ASSESSMENT / PLAN:  (J32.9) Chronic sinusitis, unspecified location  Comment: I suspect this is just going to take a longer course for full resolution so we will plan on at least 3 weeks of the Levaquin with an option for longer.  I will add prednisone on a temporary basis as well.  If this does not fully resolve it then I think he needs a CT scan and likely nasopharyngoscopy.  Plan: levofloxacin (LEVAQUIN) 500 MG tablet            (R49.0) Hoarseness  Comment: As above  Plan: predniSONE (DELTASONE) 20 MG tablet            Follow up contact me in 2 weeks  EDEN Heart MD    (Chart documentation completed in part with Dragon voice-recognition software.  Even though reviewed some grammatical, spelling, and word errors may remain.)

## 2019-06-03 ENCOUNTER — TELEPHONE (OUTPATIENT)
Dept: FAMILY MEDICINE | Facility: CLINIC | Age: 69
End: 2019-06-03

## 2019-06-03 DIAGNOSIS — J32.9 CHRONIC SINUSITIS, UNSPECIFIED LOCATION: ICD-10-CM

## 2019-06-03 DIAGNOSIS — R49.0 HOARSENESS: ICD-10-CM

## 2019-06-03 NOTE — TELEPHONE ENCOUNTER
Called and spoke with patient on the phone and reviewed providers message.     Patient forgot to ask about the prednisone in earlier conversation. Should he refill that as well and continue taking that?      Gloria Mejía RN

## 2019-06-03 NOTE — TELEPHONE ENCOUNTER
Nah - let's stick with the antibiotics and see how that does.  If he gets worse w/o it the prednisone can a;ways be refilled

## 2019-06-03 NOTE — TELEPHONE ENCOUNTER
Glad he has responded.  Yes, I think it would go a bit longer.  For sure another couple of weeks or perhaps even finish off another 3 weeks of the prescription.  Hopefully this does the trick.

## 2019-06-03 NOTE — TELEPHONE ENCOUNTER
Reason for call:  Symptom   Symptom or request: sinus infection update    Duration (how long have symptoms been present): 2 weeks  Have you been treated for this before? Yes    Additional comments: this is helping a lot  Nasal sounding voice- improved but not gone- worse in the am  Sinus drainage- reduced dramatically- worse at bedtime  Left ear popping is gone for about 5-6 days    Call to update going forward      Phone number to reach patient:  Home number on file [phone] (home)    Best Time:  any    Can we leave a detailed message on this number?  YES

## 2019-06-03 NOTE — TELEPHONE ENCOUNTER
Spoke with patient on the phone. He reports that he has improved significantly with sinus symptoms. The ear popping has gone away, reduced nasal drainage and nasal sounding voice has greatly improved.  He has 5 days left of the levofloxacin (LEVAQUIN) 500 MG tablet and still has 1 refill left. He is wondering how he should proceed with the Levaquin.       Should he finish the 5 days he has left and refill the medication? There is 1 refill left associated with this medication.    Please review and advise.    Gloria Mejía RN

## 2019-06-05 NOTE — TELEPHONE ENCOUNTER
Reason for Call:  Other prescription    Detailed comments: patient calling stating no one has called him back about his question. I did read to the patient what Dr Heart put in message, and patient had good understanding, will call back if it gets worse    Phone Number Patient can be reached at: Home number on file [phone] (home)    Best Time: any    Can we leave a detailed message on this number? YES    Call taken on 6/5/2019 at 8:35 AM by Virginia Harrell

## 2019-06-10 RX ORDER — LEVOFLOXACIN 500 MG/1
500 TABLET, FILM COATED ORAL DAILY
Qty: 21 TABLET | Refills: 0 | Status: SHIPPED | OUTPATIENT
Start: 2019-06-10 | End: 2019-07-29

## 2019-06-10 NOTE — TELEPHONE ENCOUNTER
Yes, go ahead and restart the prednisone just as prescribed.  And I will make sure there is a refill of the antibiotics as well just to get him through the vacation.  But these are not medicines we can continue indefinitely.  So if it does not clear with this course we need to be doing more and he and I discussed options including CT scan in a different ENT

## 2019-06-10 NOTE — TELEPHONE ENCOUNTER
..Reason for Call:   update    Detailed comments: Patient states he was taken off the prednisone / and to continue taking the levaquin / possibly to reintroduce the prednisone if nec / Patient feels as though he needs it again; the nasal voice returned/ears pops/drainage improved; has a refill on the prednisone, if should restart what dosage to start?; Patient also stated he's been off it for 3 days and is worse; or should he give the levaquin some more time - Patient is going vacation next week; [employer] [facility]      Phone Number Patient can be reached at: Home number on file [phone] (home)    Best Time: anytime    Can we leave a detailed message on this number? YES    Call taken on 6/10/2019 at 1:00 PM by Lela Wheatley

## 2019-06-10 NOTE — TELEPHONE ENCOUNTER
Called and spoke with patient, advised of below per Dr. Heart. Patient agreed with thoughts and plan. Will take round of prednisone as prescribed and will likely fill other round of Levaquin to have on hand if needed, while on vacation. Is understanding these medications will not be continued after this round and that if still persisting, to touch base with provider as other discussed options may need to be tried. No further questions at this time.    Yue Hdz RN

## 2019-07-01 ENCOUNTER — TELEPHONE (OUTPATIENT)
Dept: FAMILY MEDICINE | Facility: CLINIC | Age: 69
End: 2019-07-01

## 2019-07-01 NOTE — TELEPHONE ENCOUNTER
Reason for Call:  Other prescription    Detailed comments: The bacterial condition patient has been having, has gotten better.  The prednisone today is the last day of prescription. Levaquin he had it refilled and has 18 pills left. Nasal voice is good, nasal cavity draining happens when he goes to bed but it is much better. Left ear plugs momentarily gets plugged and then goes away and that is also much better.    Patient wants to know if she should stay on the medication or not, please let him know.     Phone Number Patient can be reached at: Home number on file [phone] (home)    Best Time: any    Can we leave a detailed message on this number? YES    Call taken on 7/1/2019 at 12:28 PM by Virginia Harrell

## 2019-07-02 NOTE — TELEPHONE ENCOUNTER
Please contact patient and let him know his message was routed to Dr. Heart yesterday. Dr. Heart's schedule is full and will do best he can to answer message as soon as possible. Tell him his other option is to schedule appointment in clinic with another provider today to have face to face conversation.    Gloria Mejía RN

## 2019-07-02 NOTE — TELEPHONE ENCOUNTER
Reason for Call:  Other prescription    Detailed comments: Pt calling to follow up on previous message and would like to call back for further advisement on what he should do regarding his current condition.     Phone Number Patient can be reached at: Home number on file [phone] (home)    Best Time: anytime    Can we leave a detailed message on this number? YES    Call taken on 7/2/2019 at 10:22 AM by Otto Bonilla

## 2019-07-03 NOTE — TELEPHONE ENCOUNTER
Called and informed the patient to stop prednisone and finish antibiotic.    Miya Browne RN, [facility] Triage

## 2019-07-09 ENCOUNTER — OFFICE VISIT (OUTPATIENT)
Dept: FAMILY MEDICINE | Facility: CLINIC | Age: 69
End: 2019-07-09
Payer: COMMERCIAL

## 2019-07-09 VITALS
HEIGHT: 69 IN | SYSTOLIC BLOOD PRESSURE: 136 MMHG | DIASTOLIC BLOOD PRESSURE: 80 MMHG | BODY MASS INDEX: 19.85 KG/M2 | HEART RATE: 100 BPM | TEMPERATURE: 98.3 F | WEIGHT: 134 LBS | OXYGEN SATURATION: 98 %

## 2019-07-09 DIAGNOSIS — J32.9 CHRONIC SINUSITIS, UNSPECIFIED LOCATION: Primary | ICD-10-CM

## 2019-07-09 DIAGNOSIS — F51.01 PRIMARY INSOMNIA: ICD-10-CM

## 2019-07-09 PROCEDURE — 99214 OFFICE O/P EST MOD 30 MIN: CPT | Performed by: FAMILY MEDICINE

## 2019-07-09 RX ORDER — PREDNISONE 5 MG/1
TABLET ORAL
Qty: 19 TABLET | Refills: 0 | Status: SHIPPED | OUTPATIENT
Start: 2019-07-09 | End: 2019-07-29

## 2019-07-09 RX ORDER — TRIAZOLAM 0.25 MG
TABLET ORAL
Qty: 30 TABLET | Refills: 5 | Status: SHIPPED | OUTPATIENT
Start: 2019-07-09 | End: 2020-01-24

## 2019-07-09 RX ORDER — FLUTICASONE PROPIONATE 50 MCG
1 SPRAY, SUSPENSION (ML) NASAL 2 TIMES DAILY
Qty: 16 ML | Refills: 0 | Status: SHIPPED | OUTPATIENT
Start: 2019-07-09 | End: 2019-10-11

## 2019-07-09 ASSESSMENT — MIFFLIN-ST. JEOR: SCORE: 1382.8

## 2019-07-09 NOTE — PATIENT INSTRUCTIONS
1) continue Levaquin for the remainder of the 10 days    2) We are going to restart prednisone for 10 days, but at a slightly lower dosage and gradually taper this.    3) At the same time we are going to start on a nasal cortisone (fluticasone) twice daily and this should be at its full effect by the time we are tapering off the prednisone.  And we can continue this once to twice daily and hopefully it will do the same without having the systemic effects.    4) contact me in about a week with how you are doing

## 2019-07-09 NOTE — PROGRESS NOTES
HALCION 0.25 MG rx has been faxed to [id number], [place].      Natalee BURGER)(M)

## 2019-07-09 NOTE — PROGRESS NOTES
"Subjective     Carlos Soares is a 68 year old male who presents to clinic today for the following health issues:    HPI   Acute Illness Follow-Up    Acute illness concerns: Sinus Infection  Onset: 6 months     Fever: no     Chills/Sweats: no     Headache (location?): no     Sinus Pressure:YES    Conjunctivitis:  no    Ear Pain: YES: both    Rhinorrhea: no     Congestion: no     Sore Throat: no      Cough: no    Wheeze: no     Decreased Appetite: no     Nausea: no     Vomiting: no     Diarrhea:  no     Dysuria/Freq.: no     Fatigue/Achiness: no     Sick/Strep Exposure: no      Therapies Tried and outcome: Patient is progressively getting better with every visit. Prednisone with temporary relief; after 3-4 days after stopping symptoms come back . Levaquin with relief with ear plugging.    SUBJECTIVE:  Here today with the above.  Patient has been dealing with an ongoing \"sinus infection\" since February.  Has responded to antibiotics and especially to be additional prednisone but we have discussed on a bunch of occasions that this is not an ongoing therapy plan.  Does not feel sick but does have quite a bit of pressure and eustachian tube dysfunction primarily on the left.  I had him see ENT but they were not impressed but anything serious going on.  Sinus x-rays negative.  We have discussed about a second opinion with the ENT and/or CT scan.  But he feels that he is getting better.  Each course of therapy improves things stepwise he is over 50% better than originally.  Does not carry a diagnosis of ongoing allergies but said there was concern about the possibility of an allergy over 10 years ago.  Scratch testing was negative.  Patient not having itchy watery eyes, sneezing, etc.  Primarily the sinus symptoms and a nasally voice.    Review of systems otherwise negative.  Past medical, family, and social history reviewed and updated in chart.    OBJECTIVE:  /80 (BP Location: Right arm, Patient Position: Chair, " "Cuff Size: Adult Regular)   Pulse 100   Temp 98.3  F (36.8  C) (Oral)   Ht 1.76 m (5' 9.29\")   Wt 60.8 kg (134 lb)   SpO2 98%   BMI 19.62 kg/m    Alert, pleasant, upbeat, and in no apparent discomfort.  Ears normal. Throat and pharynx normal. Neck supple. No adenopathy or masses in the neck or supraclavicular regions. Sinuses non tender.  S1 and S2 normal, no murmurs, clicks, gallops or rubs. Regular rate and rhythm. Chest is clear; no wheezes or rales. No edema or JVD.  Past labs reviewed with the patient.     ASSESSMENT / PLAN:  (J32.9) Chronic sinusitis, unspecified location  (primary encounter diagnosis)  Comment: Not really sure why this continues to be an ongoing issue.  He has 10 days left of his Levaquin and I like him to finish that and do a lower dose slow taper of prednisone for 10 days.  At the same time we will start him on scheduled Flonase as a substitute for the prednisone and see how he does.  If this does not take care of the issue think we need to have him seen again by ENT and I would likely set up a CT scan first  Plan: predniSONE (DELTASONE) 5 MG tablet, fluticasone        (FLONASE) 50 MCG/ACT nasal spray            (F51.01) Primary insomnia  Comment: Stable and refilled  Plan: triazolam (HALCION) 0.25 MG tablet            Follow up 1 week  EDEN Heart MD    (Chart documentation completed in part with Dragon voice-recognition software.  Even though reviewed some grammatical, spelling, and word errors may remain.)       "

## 2019-07-10 ENCOUNTER — TELEPHONE (OUTPATIENT)
Dept: FAMILY MEDICINE | Facility: CLINIC | Age: 69
End: 2019-07-10

## 2019-07-10 DIAGNOSIS — F51.01 PRIMARY INSOMNIA: ICD-10-CM

## 2019-07-10 RX ORDER — TRIAZOLAM 0.25 MG
TABLET ORAL
Qty: 30 TABLET | Refills: 5 | Status: CANCELLED | OUTPATIENT
Start: 2019-07-10

## 2019-07-10 NOTE — TELEPHONE ENCOUNTER
"PA for triazolam (HALCION) 0.25 MG tablet    Go to key.covermymeds.com and click \"Enter a Key\"    Key:  [id number]    Enter patients last name and     Complete the form and click \"Send to Plan\"    PA or alternative    Dahiana Herrera    "

## 2019-07-10 NOTE — TELEPHONE ENCOUNTER
..Reason for Call:  Medication or medication refill:    Do you use a [facility]?  Name of the pharmacy and phone number for the current request:  [facility] [place] - [phone]    Name of the medication  requested: triazolam (HALCION) 0.25 milligram tab    Other request: Patient was seen yesterday 07-09 for a sinus inf, discussed all medications needing refilled, this one was missed. Thank you    Can we leave a detailed message on this number? YES    Phone number patient can be reached at: Home number on file [phone] (home)    Best Time: anytime    Call taken on 7/10/2019 at 11:29 AM by Lela Wheatley

## 2019-07-15 ENCOUNTER — TELEPHONE (OUTPATIENT)
Dept: FAMILY MEDICINE | Facility: CLINIC | Age: 69
End: 2019-07-15

## 2019-07-16 NOTE — TELEPHONE ENCOUNTER
.Reason for Call:  Other prescription    Detailed comments: Patient called wondering if triazolam is sent to pharmacy and he would like a call back once it has be sent. Patient also stated that his sinus symptoms has improved-less nasal drainage, less L ear plugged, and less nasal drainage . Patient stated that he will continue taking his  Meds until Friday then he will switch to Flonase.    Phone Number Patient can be reached at: Cell number on file:    Telephone Information:   Mobile [phone]       Best Time: any    Can we leave a detailed message on this number? YES    Call taken on 7/16/2019 at 12:39 PM by Sidney Marinelli

## 2019-07-16 NOTE — TELEPHONE ENCOUNTER
Informed patient medication was sent 7/9/19  Patient thinks medication us covered but he was trying to refill too soon, we may no not need PA.    Patient will call pharmacy and call us back if he can fill medication.     FYI: to provider, see patient update.      Will keep encounter open until patient calls back.

## 2019-07-17 NOTE — TELEPHONE ENCOUNTER
PA Initiation    Medication: triazolam (HALCION) 0.25 MG tablet  Insurance Company: [employer] - Phone [phone] Fax [phone]  Pharmacy Filling the Rx: [facility] [address] [place] [address] - [address] [place] [address]  Filling Pharmacy Phone: [phone]  Filling Pharmacy Fax:    Start Date: 7/17/2019    Central Prior Authorization Team   Phone: [phone]

## 2019-07-18 NOTE — TELEPHONE ENCOUNTER
Patient got this fixed/ it was sent to [employer] who doesn't cover this prescription    This one is covered by [employer]

## 2019-07-24 ENCOUNTER — TELEPHONE (OUTPATIENT)
Dept: FAMILY MEDICINE | Facility: CLINIC | Age: 69
End: 2019-07-24

## 2019-07-24 DIAGNOSIS — J32.9 CHRONIC SINUSITIS, UNSPECIFIED LOCATION: Primary | ICD-10-CM

## 2019-07-24 RX ORDER — FLUTICASONE PROPIONATE 50 MCG
1 SPRAY, SUSPENSION (ML) NASAL
Qty: 16 G | Refills: 1 | Status: SHIPPED | OUTPATIENT
Start: 2019-07-24 | End: 2019-10-11

## 2019-07-24 NOTE — TELEPHONE ENCOUNTER
Reason for Call:  Other Medication Update    Detailed comments: Carlos wanted to update Dr Heart on his progress with his medications. He stopped the Prednisone and Levaquin last Friday 07/19. Taking Flonase twice a day. 1/3 of the bottle is left. Nasal voice returned after a few days. Less severe, not as bad as before and worse at night. Limited nasal drainage only when he lies down. Again very minimal and not as bad as before. Left ear becomes plugged momentarily on and off everyday for a few seconds between 11:30am and 1pm daily. More of a nuisance than anything else. Please call to advise if he should change what he is dong or keep with the Flonase twice a day. Everything has gotten better but still lingering.   Thank you     Phone Number Patient can be reached at: Home number on file [phone] (home)    Best Time: Any    Can we leave a detailed message on this number? YES    Call taken on 7/24/2019 at 12:18 PM by Ze Regan

## 2019-07-24 NOTE — TELEPHONE ENCOUNTER
Recommend that he continue with flonase twice a day and follow up with ENT next week if symptoms do not continue to improve

## 2019-07-24 NOTE — TELEPHONE ENCOUNTER
Patient informed ,said he already saw ENT and this was discussed with DR Heart.      Patient would like DR Heart to review when he is back in office.  Patient is done with prednisone and Levaquin and was good for a week but is coming back again.  Patient will continue with nasal spray and wait for recommendation from Dr. Heart when he is back in office.

## 2019-07-29 RX ORDER — LEVOFLOXACIN 500 MG/1
500 TABLET, FILM COATED ORAL DAILY
Qty: 14 TABLET | Refills: 0 | Status: SHIPPED | OUTPATIENT
Start: 2019-07-29 | End: 2019-08-20

## 2019-07-29 RX ORDER — PREDNISONE 5 MG/1
TABLET ORAL
Qty: 29 TABLET | Refills: 0 | Status: SHIPPED | OUTPATIENT
Start: 2019-07-29 | End: 2019-08-20

## 2019-07-29 NOTE — TELEPHONE ENCOUNTER
Patient updated on the below.    At this time, he would like to try another round of ABX and prednisone before going on a vacation next week.    He still has a nasal sound, writer was able to hear it.    Nancy Nazario RN

## 2019-07-29 NOTE — TELEPHONE ENCOUNTER
I think we have a couple of options here.  I definitely want him to continue with the Flonase.  I know we have talked about allergy testing and I think that is a good idea.  But her main decision would be either to try another course of the antibiotics and prednisone or consider doing a CT scan in meeting with a different ENT.  Or both.

## 2019-08-19 ENCOUNTER — TELEPHONE (OUTPATIENT)
Dept: FAMILY MEDICINE | Facility: CLINIC | Age: 69
End: 2019-08-19

## 2019-08-19 NOTE — TELEPHONE ENCOUNTER
Please contact patient and assist him with scheduling appointment. Patient was seen in clinic on 7/9/19 for these symptoms and he was advised to RTC in one week and he has not followed up yet in clinic as advised yet.     Gloria Mejía RN

## 2019-08-19 NOTE — TELEPHONE ENCOUNTER
Reason for Call:  Other prescription    Detailed comments: Pt stopped prednisone and levaquil on August 13th and is now doing the Flonase twice a day and has about a 1/3 bottle left.  Three of four days later, his nasal voice returned but was not as bad.  Pt has little nasal cavity drainage that has been reduced and his left ear plugging has improved as well.  He would like to know if Dr. Heart recommends to stay on Flonase or not.      Phone Number Patient can be reached at: Home number on file [phone] (home)    Best Time: anytime    Can we leave a detailed message on this number? YES    Call taken on 8/19/2019 at 1:07 PM by Otto Bonilla

## 2019-08-20 ENCOUNTER — OFFICE VISIT (OUTPATIENT)
Dept: FAMILY MEDICINE | Facility: CLINIC | Age: 69
End: 2019-08-20
Payer: COMMERCIAL

## 2019-08-20 VITALS
WEIGHT: 136 LBS | OXYGEN SATURATION: 99 % | TEMPERATURE: 97.8 F | HEIGHT: 69 IN | DIASTOLIC BLOOD PRESSURE: 86 MMHG | HEART RATE: 71 BPM | SYSTOLIC BLOOD PRESSURE: 122 MMHG | BODY MASS INDEX: 20.14 KG/M2

## 2019-08-20 DIAGNOSIS — J32.9 CHRONIC SINUSITIS, UNSPECIFIED LOCATION: ICD-10-CM

## 2019-08-20 PROCEDURE — 99214 OFFICE O/P EST MOD 30 MIN: CPT | Performed by: FAMILY MEDICINE

## 2019-08-20 RX ORDER — LEVOFLOXACIN 500 MG/1
500 TABLET, FILM COATED ORAL DAILY
Qty: 14 TABLET | Refills: 1 | Status: SHIPPED | OUTPATIENT
Start: 2019-08-20 | End: 2019-09-05

## 2019-08-20 RX ORDER — PREDNISONE 5 MG/1
TABLET ORAL
Qty: 45 TABLET | Refills: 0 | Status: SHIPPED | OUTPATIENT
Start: 2019-08-20 | End: 2019-09-05

## 2019-08-20 ASSESSMENT — MIFFLIN-ST. JEOR: SCORE: 1391.87

## 2019-08-20 NOTE — PROGRESS NOTES
Subjective     Carlos Soares is a 68 year old male who presents to clinic today for the following health issues:    HPI   Acute Illness   Acute illness concerns: Sinus Follow-Up    Patient presents to follow-up with on going sinus problems; all symptoms have improved only symptoms left is nasally sounding voice.     Therapies Tried and outcome: Predinose, flonase and Levaquin with temporary relief but symptoms come back when patient finishes medications     SUBJECTIVE:  Here today in follow-up of chronic sinusitis.  Patient well-known to me and we have been dealing with this issue for the past 6 months.  Prior to this has been extremely healthy.  Has continued to improve gradually over the 6 months.  It has typically taken a combination of antibiotics and prednisone.  Attempts to pull him off the medications have been met with the return of postnasal drip and a change in his voice.  But he is no longer feeling any pressure or illness symptoms.  The postnasal drip has improved significantly.  Still having some left-sided eustachian tube dysfunction when he is off therapy for more than a few days and his voice becomes rough.  But he is convinced at this point that it is probably more the prednisone that is helping than the antibiotics.  We have tried to transition over to Flonase but it does not seem to hold things in check.  Has been seen by ENT and no obvious discrepancy, but one option would be a full work-up including CT scan and nasopharyngoscopy.  Patient said he is probably 75% better since the beginning and even the low level prednisone keeps things in check.  No history of diabetes.  Most recent blood sugar was 112 and that is the first we have seen it be above 100    Review of systems otherwise negative.  Past medical, family, and social history reviewed and updated in chart.    OBJECTIVE:  /86 (BP Location: Right arm, Patient Position: Chair, Cuff Size: Adult Regular)   Pulse 71   Temp 97.8  F  "(36.6  C) (Oral)   Ht 1.76 m (5' 9.29\")   Wt 61.7 kg (136 lb)   SpO2 99%   BMI 19.92 kg/m    Alert, pleasant, upbeat, and in no apparent discomfort.  Ears normal. Throat and pharynx normal. Neck supple. No adenopathy or masses in the neck or supraclavicular regions. Sinuses non tender.  S1 and S2 normal, no murmurs, clicks, gallops or rubs. Regular rate and rhythm. Chest is clear; no wheezes or rales. No edema or JVD.  Past labs reviewed with the patient.     ASSESSMENT / PLAN:  (J32.9) Chronic sinusitis, unspecified location  Comment: Long discussion about what to do from a long-term standpoint.  He continues to improve and perhaps we can get away with some low-dose prednisone for another month or 2.  I will start him at 10 mg for 1 week and then transition to 5 mg daily and will be in good contact over the next couple of weeks.  I wrote out a prescription for the Levaquin but he does not seem to needed at this point and I asked that he file this away for now  Plan: levofloxacin (LEVAQUIN) 500 MG tablet,         predniSONE (DELTASONE) 5 MG tablet            Follow up contact me in 1 to 2 weeks  EDEN Heart MD    (Chart documentation completed in part with Dragon voice-recognition software.  Even though reviewed some grammatical, spelling, and word errors may remain.)     "

## 2019-09-05 ENCOUNTER — TELEPHONE (OUTPATIENT)
Dept: FAMILY MEDICINE | Facility: CLINIC | Age: 69
End: 2019-09-05

## 2019-09-05 DIAGNOSIS — J32.9 CHRONIC SINUSITIS, UNSPECIFIED LOCATION: ICD-10-CM

## 2019-09-05 RX ORDER — LEVOFLOXACIN 500 MG/1
500 TABLET, FILM COATED ORAL DAILY
Qty: 14 TABLET | Refills: 1 | Status: SHIPPED | OUTPATIENT
Start: 2019-09-05 | End: 2019-10-11

## 2019-09-05 RX ORDER — PREDNISONE 5 MG/1
TABLET ORAL
Qty: 45 TABLET | Refills: 0 | Status: SHIPPED | OUTPATIENT
Start: 2019-09-05 | End: 2019-10-11

## 2019-09-05 NOTE — TELEPHONE ENCOUNTER
Reason for call:  Symptom - 2 week recheck  Symptom or request: sinus    Duration (how long have symptoms been present): days  Have you been treated for this before? Yes    Additional comments: patient did fill the Levaquin prescription   After a few days of prednisone he felt better- voice is good also    Only having a little bit of drainage when he lays down  No ear plugging    He is down to one each of those meds a day  Should he keep that up for a week or two? Call to advise      Phone number to reach patient:  Home number on file [phone] (home)    Best Time:  any    Can we leave a detailed message on this number?  YES

## 2019-09-05 NOTE — TELEPHONE ENCOUNTER
Patient said he was not asking for refills, he has plenty left.   He is questioning if he should continue a low dose of the Prednisone. He voices that he is doing much better and Rx is helping. Calling to follow up with provider as planned. Questioning if he should continue Prednisone long term.      [id number], MEDICAL ASSISTANT

## 2019-09-20 RX ORDER — PREDNISONE 1 MG/1
2.5 TABLET ORAL DAILY
Qty: 20 TABLET | Refills: 0 | Status: SHIPPED | OUTPATIENT
Start: 2019-09-20 | End: 2019-10-11

## 2019-09-20 NOTE — TELEPHONE ENCOUNTER
Patient can decrease to 2.5 mg daily for 7 days then stop.  He can update Dr. Heart in a week or 2 if he has voice issues once he decreases the dose and/or stops.  He should call if he has further questions.    BRIAN Allan, NP-C  [facility]

## 2019-09-20 NOTE — TELEPHONE ENCOUNTER
Patient informed agreed with a plan.  He only has a supply of 5 mg that are tiny and hard to break.    Patient is requesting a 2.5mg tablets of 7 day supply be send to the pharmacy.    Nancy Nazario RN

## 2019-09-20 NOTE — TELEPHONE ENCOUNTER
Patient calling for 2 week f-up    Took his last Levaquin today  The nasal drainage and ear plugging greatly improved    Re; Prednisone, he has been doing one 5mg a day for 3 weeks now  That is working good with his voice  Issue    He has about 45 left of prednisone  Should he keep up the prednisone or stop and see what happens    [phone] ok to leave message

## 2019-09-27 ENCOUNTER — TELEPHONE (OUTPATIENT)
Dept: FAMILY MEDICINE | Facility: CLINIC | Age: 69
End: 2019-09-27

## 2019-09-27 DIAGNOSIS — R49.9 CHANGE IN VOICE: ICD-10-CM

## 2019-09-27 DIAGNOSIS — J32.9 CHRONIC SINUSITIS, UNSPECIFIED LOCATION: Primary | ICD-10-CM

## 2019-09-27 NOTE — TELEPHONE ENCOUNTER
Reason for Call:  Medication Follow up    Detailed comments: Pt states that he was recently reduced on Prednisone from 5 mg per day to 2.5 per day. States that while his voice is not perfect like it was on the 5mg, the 2.5 is very good and he can live with it.  He is requesting further direction on wether he should be staying at the 2.5 mg per day or if he should maybe reduce again. Thank you.    Phone Number Patient can be reached at: Home number on file [phone] (home)    Best Time: Any    Can we leave a detailed message on this number? YES    Call taken on 9/27/2019 at 2:17 PM by Bree Magana

## 2019-09-27 NOTE — TELEPHONE ENCOUNTER
Please see message below from patient . Several messages going regarding this from 9/5/19 phone encounter. Patient was told to call.  Gloria Mejía RN

## 2019-09-27 NOTE — TELEPHONE ENCOUNTER
Per Jeanette's recommendations I recommend 2.5 mg daily for 7 days then discontinue and follow up with ENT at [facility] ([phone]) if symptoms not resolved.   Return urgently if any change in symptoms.

## 2019-10-11 ENCOUNTER — OFFICE VISIT (OUTPATIENT)
Dept: FAMILY MEDICINE | Facility: CLINIC | Age: 69
End: 2019-10-11
Payer: COMMERCIAL

## 2019-10-11 VITALS
WEIGHT: 138 LBS | OXYGEN SATURATION: 98 % | DIASTOLIC BLOOD PRESSURE: 80 MMHG | HEIGHT: 69 IN | BODY MASS INDEX: 20.44 KG/M2 | SYSTOLIC BLOOD PRESSURE: 142 MMHG | TEMPERATURE: 97.8 F | HEART RATE: 95 BPM

## 2019-10-11 DIAGNOSIS — J32.9 CHRONIC SINUSITIS, UNSPECIFIED LOCATION: ICD-10-CM

## 2019-10-11 PROCEDURE — 99214 OFFICE O/P EST MOD 30 MIN: CPT | Performed by: FAMILY MEDICINE

## 2019-10-11 RX ORDER — PREDNISONE 1 MG/1
3 TABLET ORAL DAILY
Qty: 90 TABLET | Refills: 0 | Status: SHIPPED | OUTPATIENT
Start: 2019-10-11 | End: 2020-01-24

## 2019-10-11 ASSESSMENT — MIFFLIN-ST. JEOR: SCORE: 1400.94

## 2019-10-11 NOTE — PROGRESS NOTES
"Subjective     Carlos Soares is a 68 year old male who presents to clinic today for the following health issues:    HPI   Concern - Sinus Follow-Up  Onset: January 2019    Description:   Patient is no longer having ear plugging or coughing, just drainage at night but it's reduced compared to what it was.     Intensity: Very mild    Progression of Symptoms:  improving    Therapies Tried and outcome: Levaquin and Prednisone with improvement but still having nasal drainage at night     SUBJECTIVE:  Here today in follow-up of chronic sinusitis.  This is an issue that started almost a year ago.  Has been on prolonged courses of antibiotics and steroids with significant improvement.  But symptoms return as soon as we stopped.  Overall has continued to improve over a number of months and he feels that he is probably 75% better than he was initially but this has necessitated the continuance of the prednisone.  We do have him down to a dosage of 5 mg daily.  I had spoken with him in the past with the possibility of following up again with the ENT.  He had met with Dr. Pennington last year and she recommended continuing of some therapy for a bit.  But I discussed that what may need to be done is a CT scan and/or nasopharyngoscopy.  He actually has an appointment set up with Dr. Mercedes on the 22nd.    Review of systems otherwise negative.  Past medical, family, and social history reviewed and updated in chart.    OBJECTIVE:  BP (!) 142/80   Pulse 95   Temp 97.8  F (36.6  C) (Oral)   Ht 1.76 m (5' 9.29\")   Wt 62.6 kg (138 lb)   SpO2 98%   BMI 20.21 kg/m    Alert, pleasant, upbeat, and in no apparent discomfort.   Ears normal. Throat and pharynx normal. Neck supple. No adenopathy or masses in the neck or supraclavicular regions. Sinuses non tender.  S1 and S2 normal, no murmurs, clicks, gallops or rubs. Regular rate and rhythm. Chest is clear; no wheezes or rales. No edema or JVD.  Past labs reviewed with the patient. "     ASSESSMENT / PLAN:  (J32.9) Chronic sinusitis, unspecified location  Comment: I do want to keep the follow-up appointment because I think some more testing needs to be done as to why it has taken this long.  I am okay with continue with a low-dose of prednisone for now but obviously we do not necessarily want to continue this indefinitely.  I do see him on a regular basis and we can always keep up with monitoring of sugar levels, bone density, etc.  A low dosage is unlikely to cause these problems but follow-up is indicated.  Plan: predniSONE (DELTASONE) 1 MG tablet            Follow up contact me in a couple of weeks following appointment  EDEN Heart MD    (Chart documentation completed in part with Dragon voice-recognition software.  Even though reviewed some grammatical, spelling, and word errors may remain.)

## 2019-10-22 ENCOUNTER — TELEPHONE (OUTPATIENT)
Dept: OTOLARYNGOLOGY | Facility: CLINIC | Age: 69
End: 2019-10-22

## 2019-10-22 ENCOUNTER — OFFICE VISIT (OUTPATIENT)
Dept: OTOLARYNGOLOGY | Facility: CLINIC | Age: 69
End: 2019-10-22
Attending: PHYSICIAN ASSISTANT
Payer: COMMERCIAL

## 2019-10-22 ENCOUNTER — ANCILLARY PROCEDURE (OUTPATIENT)
Dept: CT IMAGING | Facility: CLINIC | Age: 69
End: 2019-10-22
Attending: OTOLARYNGOLOGY
Payer: COMMERCIAL

## 2019-10-22 VITALS — HEART RATE: 85 BPM | SYSTOLIC BLOOD PRESSURE: 135 MMHG | DIASTOLIC BLOOD PRESSURE: 90 MMHG | OXYGEN SATURATION: 99 %

## 2019-10-22 DIAGNOSIS — J39.2 CYST OF NASOPHARYNX: ICD-10-CM

## 2019-10-22 DIAGNOSIS — K21.9 GASTROESOPHAGEAL REFLUX DISEASE WITHOUT ESOPHAGITIS: Primary | ICD-10-CM

## 2019-10-22 PROCEDURE — 31575 DIAGNOSTIC LARYNGOSCOPY: CPT | Performed by: OTOLARYNGOLOGY

## 2019-10-22 PROCEDURE — 99213 OFFICE O/P EST LOW 20 MIN: CPT | Mod: 25 | Performed by: OTOLARYNGOLOGY

## 2019-10-22 PROCEDURE — 70486 CT MAXILLOFACIAL W/O DYE: CPT | Performed by: RADIOLOGY

## 2019-10-22 RX ORDER — PANTOPRAZOLE SODIUM 40 MG/1
40 TABLET, DELAYED RELEASE ORAL DAILY
Qty: 60 TABLET | Refills: 1 | Status: SHIPPED | OUTPATIENT
Start: 2019-10-22 | End: 2020-09-08

## 2019-10-22 ASSESSMENT — ENCOUNTER SYMPTOMS
BRUISES/BLEEDS EASILY: 0
HEARTBURN: 0
HEMOPTYSIS: 0
TINGLING: 0
TREMORS: 0
VOMITING: 0
DOUBLE VISION: 0
NAUSEA: 0
SPUTUM PRODUCTION: 0
HEADACHES: 0
DIZZINESS: 0
COUGH: 0
CONSTITUTIONAL NEGATIVE: 1
BLURRED VISION: 0

## 2019-10-22 ASSESSMENT — PAIN SCALES - GENERAL: PAINLEVEL: NO PAIN (0)

## 2019-10-22 NOTE — PROGRESS NOTES
HPI     This is a 69 year old patient who has been having voice changes for more than a year. Describes raspy voice in certain time of the day. He snores but no apnea. States that his voice does sound nasal to him. Denies any heartburn,  difficulty swallowing, post nasal drip, breathing issues, facial pressure, runny nose, sneezing or coughing. He is a current smoker one pack in 3 days. States that low dose steroid everyday controls his voice related symptoms. X-ray from April of this year showed no infection in his sinuses.    Review of Systems   Constitutional: Negative.    HENT: Negative for ear discharge, ear pain, hearing loss and tinnitus.    Eyes: Negative for blurred vision and double vision.   Respiratory: Negative for cough, hemoptysis and sputum production.    Gastrointestinal: Negative for heartburn, nausea and vomiting.   Skin: Negative.    Neurological: Negative for dizziness, tingling, tremors and headaches.   Endo/Heme/Allergies: Negative for environmental allergies. Does not bruise/bleed easily.         Physical Exam  Vitals signs reviewed.   HENT:      Head: Normocephalic and atraumatic.      Right Ear: Hearing, tympanic membrane, ear canal and external ear normal. No decreased hearing noted. No laceration. There is no impacted cerumen.      Left Ear: Hearing, tympanic membrane, ear canal and external ear normal. No decreased hearing noted. No laceration. There is no impacted cerumen.      Nose: Septal deviation present. No rhinorrhea.      Mouth/Throat:      Mouth: Mucous membranes are moist.      Pharynx: Oropharynx is clear. Uvula midline.      Tonsils: No tonsillar exudate or tonsillar abscesses.   Eyes:      Extraocular Movements: Extraocular movements intact.      Pupils: Pupils are equal, round, and reactive to light.         Septum is deviated to the left. Right middle turbinate: layo bullosa. Inferior turbinates are enlarged.    Diagnostic nasal endoscopy:     He was seen in the room and  identified. Pros and cons of the procedure were explained to the patient. The procedure and its alternatives were explained to the patient in lay terms. His questions were answered. His symptoms required a diagnostic endoscopic evaluation under local anesthesia. After obtaining an informed consent from the patient, 3% Lidocaine with 1: 100.000 epinephrine spray was applied to each nostril. Then a flexible scopic exam was performed. Septum is deviated to the left. Ostiomeatal complexes are free of disease. No pus or polyp seen. Inferior turbinates are enlarged. Slight asymmetry in the left nasopharyngeal lateral wall; mucosa is intact and  torus tubarius is normal. Epiglottis, hypopharynx, false vocal folds are normal. No pooling in pyriform fossae. Vocal cords are mobile with Charisma's edema otherwise normal. He tolerated the procedure well and left the room with no complications.    A/P    This pleasant patient is having voice changes for more than a year. I will check the slight asymmetry in his nasopharynx with an imaging study and give him a call. In the meantime, smoking cessation and anti-reflux medication with Pantoprazole 40 mg. Will be tried. His questions were answered.

## 2019-10-22 NOTE — LETTER
10/22/2019         RE: Carlos Soares  [address] [facility] [address]        Dear Colleague,    Thank you for referring your patient, Carlos Soares, to the [facility]. Please see a copy of my visit note below.    HPI     This is a 69 year old patient who has been having voice changes for more than a year. Describes raspy voice in certain time of the day. He snores but no apnea. States that his voice does sound nasal to him. Denies any heartburn,  difficulty swallowing, post nasal drip, breathing issues, facial pressure, runny nose, sneezing or coughing. He is a current smoker one pack in 3 days. States that low dose steroid everyday controls his voice related symptoms. X-ray from April of this year showed no infection in his sinuses.    Review of Systems   Constitutional: Negative.    HENT: Negative for ear discharge, ear pain, hearing loss and tinnitus.    Eyes: Negative for blurred vision and double vision.   Respiratory: Negative for cough, hemoptysis and sputum production.    Gastrointestinal: Negative for heartburn, nausea and vomiting.   Skin: Negative.    Neurological: Negative for dizziness, tingling, tremors and headaches.   Endo/Heme/Allergies: Negative for environmental allergies. Does not bruise/bleed easily.         Physical Exam  Vitals signs reviewed.   HENT:      Head: Normocephalic and atraumatic.      Right Ear: Hearing, tympanic membrane, ear canal and external ear normal. No decreased hearing noted. No laceration. There is no impacted cerumen.      Left Ear: Hearing, tympanic membrane, ear canal and external ear normal. No decreased hearing noted. No laceration. There is no impacted cerumen.      Nose: Septal deviation present. No rhinorrhea.      Mouth/Throat:      Mouth: Mucous membranes are moist.      Pharynx: Oropharynx is clear. Uvula midline.      Tonsils: No tonsillar exudate or tonsillar abscesses.   Eyes:      Extraocular Movements: Extraocular  movements intact.      Pupils: Pupils are equal, round, and reactive to light.         Septum is deviated to the left. Right middle turbinate: layo bullosa. Inferior turbinates are enlarged.    Diagnostic nasal endoscopy:     He was seen in the room and identified. Pros and cons of the procedure were explained to the patient. The procedure and its alternatives were explained to the patient in lay terms. His questions were answered. His symptoms required a diagnostic endoscopic evaluation under local anesthesia. After obtaining an informed consent from the patient, 3% Lidocaine with 1: 100.000 epinephrine spray was applied to each nostril. Then a flexible scopic exam was performed. Septum is deviated to the left. Ostiomeatal complexes are free of disease. No pus or polyp seen. Inferior turbinates are enlarged. Slight asymmetry in the left nasopharyngeal lateral wall; mucosa is intact and  torus tubarius is normal. Epiglottis, hypopharynx, false vocal folds are normal. No pooling in pyriform fossae. Vocal cords are mobile with Charisma's edema otherwise normal. He tolerated the procedure well and left the room with no complications.    A/P    This pleasant patient is having voice changes for more than a year. I will check the slight asymmetry in his nasopharynx with an imaging study and give him a call. In the meantime, smoking cessation and anti-reflux medication with Pantoprazole 40 mg. Will be tried. His questions were answered.      Again, thank you for allowing me to participate in the care of your patient.        Sincerely,        Krystle Mercedes MD

## 2019-10-22 NOTE — TELEPHONE ENCOUNTER
----- Message from Krystle Mercedes MD sent at 10/22/2019 12:37 PM CDT -----  I can see that he has been having tendency for recurrent sinus infections (as we talked to the patient when he was here). He needs to resume Fluticasone nasal spray once a day because mucosal thickening in his ethmoidal sinuses and nasal mucosa bilaterally ( we talked about it). CT shows periapical lucency around the first left maxillary molar and limited infection in the left maxillary sinus associated with it. I would recommend him to see his dentist. Regarding the asymmetry in the left nasopharynx is benign due to a cyst ( as we talked about it in the room). I will see him in the f/u.

## 2019-10-22 NOTE — NURSING NOTE
Carlos Soares's goals for this visit include:   Chief Complaint   Patient presents with     Consult     Chronic sinusitis, change in voice     He requests these members of his care team be copied on today's visit information: Yes    PCP: [facility]    Referring Provider:  Kortney Hunter PA-C  [address] [place] [address]    BP (!) 135/90 (BP Location: Right arm, Patient Position: Sitting, Cuff Size: Adult Large)   Pulse 85   SpO2 99%     Do you need any medication refills at today's visit? No    Chata Rasmussen LPN

## 2019-10-22 NOTE — TELEPHONE ENCOUNTER
Spoke to Carlos and discussed results together. He has a dentist appointment scheduled for 11/21 and plans to bring up the concern of his first left maxillary molar that was flagged concerning in today's CT scan. Patient agreed with that plan and will call with any further questions or concerns.    Chata Rasmussen LPN

## 2019-11-19 ENCOUNTER — OFFICE VISIT (OUTPATIENT)
Dept: OTOLARYNGOLOGY | Facility: CLINIC | Age: 69
End: 2019-11-19
Payer: COMMERCIAL

## 2019-11-19 VITALS — HEART RATE: 83 BPM | DIASTOLIC BLOOD PRESSURE: 81 MMHG | SYSTOLIC BLOOD PRESSURE: 124 MMHG

## 2019-11-19 DIAGNOSIS — J32.0 CHRONIC MAXILLARY SINUSITIS: Primary | ICD-10-CM

## 2019-11-19 PROCEDURE — 99213 OFFICE O/P EST LOW 20 MIN: CPT | Performed by: OTOLARYNGOLOGY

## 2019-11-19 NOTE — LETTER
11/19/2019         RE: Carlos Soares  [address] [facility] [address]        Dear Colleague,    Thank you for referring your patient, Carlos Soares, to the [facility]. Please see a copy of my visit note below.    HPI     This 69 year old patient is here to review the CT results. He has been having hyponasal voice and voice changes for more than a year. Describes raspy voice in certain time of the day. He snores but no apnea. States that his voice does sound nasal to him. Denies any heartburn,  difficulty swallowing, post nasal drip, breathing issues, facial pressure, runny nose, sneezing or coughing. He is a current smoker one pack in 3 days. States that low dose steroid everyday controls his voice related symptoms. X-ray from April of this year showed no infection in his sinuses.    His CT of sinuses: Opacification of ethmoidal sinuses; layo bullosa in the right middle turbinate; septal deviation to the left; periapical lucency in the first maxillary molar with limited sinusitis in the left maxillary sinus.    Review of Systems   Constitutional: Negative.    HENT: Negative for ear discharge, ear pain, hearing loss and tinnitus.    Eyes: Negative for blurred vision and double vision.   Respiratory: Negative for cough, hemoptysis and sputum production.    Gastrointestinal: Negative for heartburn, nausea and vomiting.   Skin: Negative.    Neurological: Negative for dizziness, tingling, tremors and headaches.   Endo/Heme/Allergies: Negative for environmental allergies. Does not bruise/bleed easily.         Physical Exam  Vitals signs reviewed.   HENT:      Head: Normocephalic and atraumatic.      Right Ear: Hearing, tympanic membrane, ear canal and external ear normal. No decreased hearing noted. No laceration. There is no impacted cerumen.      Left Ear: Hearing, tympanic membrane, ear canal and external ear normal. No decreased hearing noted. No laceration. There is no  impacted cerumen.      Nose: Septal deviation present. No rhinorrhea.      Mouth/Throat:      Mouth: Mucous membranes are moist.      Pharynx: Oropharynx is clear. Uvula midline.      Tonsils: No tonsillar exudate or tonsillar abscesses.   Eyes:      Extraocular Movements: Extraocular movements intact.      Pupils: Pupils are equal, round, and reactive to light.         Septum is deviated to the left. Right middle turbinate: layo bullosa. Inferior turbinates are enlarged.    A/P    This pleasant patient is having voice changes for more than a year. His CT of sinuses: Opacification of ethmoidal sinuses; layo bullosa in the right middle turbinate; septal deviation to the left; periapical lucency in the first maxillary molar with limited sinusitis in the left maxillary sinus.  He will see his dentist this week to fix odontogenic infection and continue with topical nasal steroid. I will see him in the f/u in a month. If the problem continues, options including septoplasty, turbinoplasty, correction of ositomeatal units and layo bullosa and direct nasopharyngoscopy with possible biopsy will be considered. In the meantime, smoking cessation will be tried. His questions were answered.      Again, thank you for allowing me to participate in the care of your patient.        Sincerely,        Krystle Mercedes MD

## 2019-11-19 NOTE — NURSING NOTE
Carlos Soares's goals for this visit include: refills   He requests these members of his care team be copied on today's visit information: Yes    PCP: [facility]    Referring Provider:  Kortney Hunter PA-C  [address] [place] [address]    /81 (BP Location: Right arm, Patient Position: Sitting, Cuff Size: Adult Large)   Pulse 83     Do you need any medication refills at today's visit? No    Chata Rasmussen LPN

## 2019-11-19 NOTE — PROGRESS NOTES
HPI     This 69 year old patient is here to review the CT results. He has been having hyponasal voice and voice changes for more than a year. Describes raspy voice in certain time of the day. He snores but no apnea. States that his voice does sound nasal to him. Denies any heartburn,  difficulty swallowing, post nasal drip, breathing issues, facial pressure, runny nose, sneezing or coughing. He is a current smoker one pack in 3 days. States that low dose steroid everyday controls his voice related symptoms. X-ray from April of this year showed no infection in his sinuses.    His CT of sinuses: Opacification of ethmoidal sinuses; layo bullosa in the right middle turbinate; septal deviation to the left; periapical lucency in the first maxillary molar with limited sinusitis in the left maxillary sinus.    Review of Systems   Constitutional: Negative.    HENT: Negative for ear discharge, ear pain, hearing loss and tinnitus.    Eyes: Negative for blurred vision and double vision.   Respiratory: Negative for cough, hemoptysis and sputum production.    Gastrointestinal: Negative for heartburn, nausea and vomiting.   Skin: Negative.    Neurological: Negative for dizziness, tingling, tremors and headaches.   Endo/Heme/Allergies: Negative for environmental allergies. Does not bruise/bleed easily.         Physical Exam  Vitals signs reviewed.   HENT:      Head: Normocephalic and atraumatic.      Right Ear: Hearing, tympanic membrane, ear canal and external ear normal. No decreased hearing noted. No laceration. There is no impacted cerumen.      Left Ear: Hearing, tympanic membrane, ear canal and external ear normal. No decreased hearing noted. No laceration. There is no impacted cerumen.      Nose: Septal deviation present. No rhinorrhea.      Mouth/Throat:      Mouth: Mucous membranes are moist.      Pharynx: Oropharynx is clear. Uvula midline.      Tonsils: No tonsillar exudate or tonsillar abscesses.   Eyes:       Extraocular Movements: Extraocular movements intact.      Pupils: Pupils are equal, round, and reactive to light.         Septum is deviated to the left. Right middle turbinate: layo bullosa. Inferior turbinates are enlarged.    A/P    This pleasant patient is having voice changes for more than a year. His CT of sinuses: Opacification of ethmoidal sinuses; layo bullosa in the right middle turbinate; septal deviation to the left; periapical lucency in the first maxillary molar with limited sinusitis in the left maxillary sinus.  He will see his dentist this week to fix odontogenic infection and continue with topical nasal steroid. I will see him in the f/u in a month. If the problem continues, options including septoplasty, turbinoplasty, correction of ositomeatal units and layo bullosa and direct nasopharyngoscopy with possible biopsy will be considered. In the meantime, smoking cessation will be tried. His questions were answered.

## 2019-12-24 ENCOUNTER — OFFICE VISIT (OUTPATIENT)
Dept: OTOLARYNGOLOGY | Facility: CLINIC | Age: 69
End: 2019-12-24
Payer: COMMERCIAL

## 2019-12-24 VITALS — DIASTOLIC BLOOD PRESSURE: 89 MMHG | OXYGEN SATURATION: 97 % | SYSTOLIC BLOOD PRESSURE: 125 MMHG | HEART RATE: 86 BPM

## 2019-12-24 DIAGNOSIS — K21.9 GASTROESOPHAGEAL REFLUX DISEASE WITHOUT ESOPHAGITIS: Primary | ICD-10-CM

## 2019-12-24 PROCEDURE — 99213 OFFICE O/P EST LOW 20 MIN: CPT | Mod: 25 | Performed by: OTOLARYNGOLOGY

## 2019-12-24 PROCEDURE — 31575 DIAGNOSTIC LARYNGOSCOPY: CPT | Performed by: OTOLARYNGOLOGY

## 2019-12-24 NOTE — LETTER
12/24/2019         RE: Carlos Soares  [address] [facility] [address]        Dear Colleague,    Thank you for referring your patient, Carlos Soares, to the [facility]. Please see a copy of my visit note below.    HPI     This 69 year old patient is here to review the CT results. He has been having hyponasal voice and voice changes for more than a year. Describes raspy voice in certain time of the day. He snores but no apnea. States that his voice does sound nasal to him. Denies any heartburn,  difficulty swallowing, post nasal drip, breathing issues, facial pressure, runny nose, sneezing or coughing. He is a current smoker one pack in 3 days. States that low dose steroid everyday controls his voice related symptoms. X-ray from April of this year showed no infection in his sinuses.    His CT of sinuses: Opacification of ethmoidal sinuses; layo bullosa in the right middle turbinate; septal deviation to the left; periapical lucency in the first maxillary molar with limited sinusitis in the left maxillary sinus.    Review of Systems   Constitutional: Negative.    HENT: Negative for ear discharge, ear pain, hearing loss and tinnitus.    Eyes: Negative for blurred vision and double vision.   Respiratory: Negative for cough, hemoptysis and sputum production.    Gastrointestinal: Negative for heartburn, nausea and vomiting.   Skin: Negative.    Neurological: Negative for dizziness, tingling, tremors and headaches.   Endo/Heme/Allergies: Negative for environmental allergies. Does not bruise/bleed easily.         Physical Exam  Vitals signs reviewed.   HENT:      Head: Normocephalic and atraumatic.      Right Ear: Hearing, tympanic membrane, ear canal and external ear normal. No decreased hearing noted. No laceration. There is no impacted cerumen.      Left Ear: Hearing, tympanic membrane, ear canal and external ear normal. No decreased hearing noted. No laceration. There is no  impacted cerumen.      Nose: Septal deviation present. No rhinorrhea.      Mouth/Throat:      Mouth: Mucous membranes are moist.      Pharynx: Oropharynx is clear. Uvula midline.      Tonsils: No tonsillar exudate or tonsillar abscesses.   Eyes:      Extraocular Movements: Extraocular movements intact.      Pupils: Pupils are equal, round, and reactive to light.     Diagnostic nasal endoscopy:     He was seen in the room and identified. Pros and cons of the procedure were explained to the patient. The procedure and its alternatives were explained to the patient in lay terms. His questions were answered. His symptoms required a diagnostic endoscopic evaluation under local anesthesia. After obtaining an informed consent from the patient, 3% Lidocaine with 1: 100.000 epinephrine spray was applied to each nostril. Then a flexible scopic exam was performed. Septum is deviated to the left. Ostiomeatal complexes are free of disease but swollen. No pus or polyp seen. Inferior turbinates are enlarged. Nasopharynx is normal. Rosenmüller fossa and torus tubarius are normal. Epiglottis, hypopharynx, false vocal folds are normal. No pooling in pyriform fossae. Vocal cords are mobile and normal. He tolerated the procedure well and left the room with no complications.    Septum is deviated to the left. Right middle turbinate: layo bullosa. Inferior turbinates are enlarged.    A/P    This pleasant patient is having voice changes for more than a year. His CT of sinuses: Opacification of ethmoidal sinuses; layo bullosa in the right middle turbinate; septal deviation to the left. He will continue with antireflux medication one more month and f/u in 2 months.  His odontogenic infection was taken care of. If the problem continues, options including septoplasty, turbinoplasty, correction of ostiomeatal units and layo bullosa will be considered. In the meantime, smoking cessation will be tried. His questions were  answered.      Again, thank you for allowing me to participate in the care of your patient.        Sincerely,        Krystle Mercedes MD

## 2019-12-24 NOTE — NURSING NOTE
Carlos Soares's goals for this visit include:   Chief Complaint   Patient presents with     RECHECK     Follow up, sinus concerns     He requests these members of his care team be copied on today's visit information: Yes    PCP: [facility]    Referring Provider:  No referring provider defined for this encounter.    /89 (BP Location: Right arm, Patient Position: Sitting, Cuff Size: Adult Regular)   Pulse 86   SpO2 97%     Do you need any medication refills at today's visit? No    Chata Rasmussen LPN

## 2019-12-24 NOTE — PROGRESS NOTES
HPI     This 69 year old patient is here to review the CT results. He has been having hyponasal voice and voice changes for more than a year. Describes raspy voice in certain time of the day. He snores but no apnea. States that his voice does sound nasal to him. Denies any heartburn,  difficulty swallowing, post nasal drip, breathing issues, facial pressure, runny nose, sneezing or coughing. He is a current smoker one pack in 3 days. States that low dose steroid everyday controls his voice related symptoms. X-ray from April of this year showed no infection in his sinuses.    His CT of sinuses: Opacification of ethmoidal sinuses; layo bullosa in the right middle turbinate; septal deviation to the left; periapical lucency in the first maxillary molar with limited sinusitis in the left maxillary sinus.    Review of Systems   Constitutional: Negative.    HENT: Negative for ear discharge, ear pain, hearing loss and tinnitus.    Eyes: Negative for blurred vision and double vision.   Respiratory: Negative for cough, hemoptysis and sputum production.    Gastrointestinal: Negative for heartburn, nausea and vomiting.   Skin: Negative.    Neurological: Negative for dizziness, tingling, tremors and headaches.   Endo/Heme/Allergies: Negative for environmental allergies. Does not bruise/bleed easily.         Physical Exam  Vitals signs reviewed.   HENT:      Head: Normocephalic and atraumatic.      Right Ear: Hearing, tympanic membrane, ear canal and external ear normal. No decreased hearing noted. No laceration. There is no impacted cerumen.      Left Ear: Hearing, tympanic membrane, ear canal and external ear normal. No decreased hearing noted. No laceration. There is no impacted cerumen.      Nose: Septal deviation present. No rhinorrhea.      Mouth/Throat:      Mouth: Mucous membranes are moist.      Pharynx: Oropharynx is clear. Uvula midline.      Tonsils: No tonsillar exudate or tonsillar abscesses.   Eyes:       Extraocular Movements: Extraocular movements intact.      Pupils: Pupils are equal, round, and reactive to light.     Diagnostic nasal endoscopy:     He was seen in the room and identified. Pros and cons of the procedure were explained to the patient. The procedure and its alternatives were explained to the patient in lay terms. His questions were answered. His symptoms required a diagnostic endoscopic evaluation under local anesthesia. After obtaining an informed consent from the patient, 3% Lidocaine with 1: 100.000 epinephrine spray was applied to each nostril. Then a flexible scopic exam was performed. Septum is deviated to the left. Ostiomeatal complexes are free of disease but swollen. No pus or polyp seen. Inferior turbinates are enlarged. Nasopharynx is normal. Rosenmüller fossa and torus tubarius are normal. Epiglottis, hypopharynx, false vocal folds are normal. No pooling in pyriform fossae. Vocal cords are mobile and normal. He tolerated the procedure well and left the room with no complications.    Septum is deviated to the left. Right middle turbinate: layo bullosa. Inferior turbinates are enlarged.    A/P    This pleasant patient is having voice changes for more than a year. His CT of sinuses: Opacification of ethmoidal sinuses; layo bullosa in the right middle turbinate; septal deviation to the left. He will continue with antireflux medication one more month and f/u in 2 months.  His odontogenic infection was taken care of. If the problem continues, options including septoplasty, turbinoplasty, correction of ostiomeatal units and layo bullosa will be considered. In the meantime, smoking cessation will be tried. His questions were answered.

## 2020-01-24 ENCOUNTER — OFFICE VISIT (OUTPATIENT)
Dept: FAMILY MEDICINE | Facility: CLINIC | Age: 70
End: 2020-01-24
Payer: COMMERCIAL

## 2020-01-24 VITALS
RESPIRATION RATE: 18 BRPM | WEIGHT: 131 LBS | HEIGHT: 69 IN | OXYGEN SATURATION: 99 % | DIASTOLIC BLOOD PRESSURE: 68 MMHG | BODY MASS INDEX: 19.4 KG/M2 | SYSTOLIC BLOOD PRESSURE: 120 MMHG | HEART RATE: 97 BPM | TEMPERATURE: 98.4 F

## 2020-01-24 DIAGNOSIS — Z23 NEED FOR VACCINATION: ICD-10-CM

## 2020-01-24 DIAGNOSIS — F51.01 PRIMARY INSOMNIA: ICD-10-CM

## 2020-01-24 DIAGNOSIS — Z23 NEED FOR PROPHYLACTIC VACCINATION AND INOCULATION AGAINST INFLUENZA: ICD-10-CM

## 2020-01-24 DIAGNOSIS — Z12.5 SCREENING FOR PROSTATE CANCER: ICD-10-CM

## 2020-01-24 DIAGNOSIS — Z00.00 ROUTINE GENERAL MEDICAL EXAMINATION AT A HEALTH CARE FACILITY: Primary | ICD-10-CM

## 2020-01-24 LAB
CHOLEST SERPL-MCNC: 206 MG/DL
GLUCOSE SERPL-MCNC: 88 MG/DL (ref 70–99)
HDLC SERPL-MCNC: 76 MG/DL
LDLC SERPL CALC-MCNC: 105 MG/DL
NONHDLC SERPL-MCNC: 130 MG/DL
PSA SERPL-ACNC: 1.27 UG/L (ref 0–4)
TRIGL SERPL-MCNC: 123 MG/DL

## 2020-01-24 PROCEDURE — G0009 ADMIN PNEUMOCOCCAL VACCINE: HCPCS | Performed by: FAMILY MEDICINE

## 2020-01-24 PROCEDURE — 36415 COLL VENOUS BLD VENIPUNCTURE: CPT | Performed by: FAMILY MEDICINE

## 2020-01-24 PROCEDURE — 90662 IIV NO PRSV INCREASED AG IM: CPT | Performed by: FAMILY MEDICINE

## 2020-01-24 PROCEDURE — G0439 PPPS, SUBSEQ VISIT: HCPCS | Performed by: FAMILY MEDICINE

## 2020-01-24 PROCEDURE — 90732 PPSV23 VACC 2 YRS+ SUBQ/IM: CPT | Performed by: FAMILY MEDICINE

## 2020-01-24 PROCEDURE — G0103 PSA SCREENING: HCPCS | Performed by: FAMILY MEDICINE

## 2020-01-24 PROCEDURE — 82947 ASSAY GLUCOSE BLOOD QUANT: CPT | Performed by: FAMILY MEDICINE

## 2020-01-24 PROCEDURE — G0008 ADMIN INFLUENZA VIRUS VAC: HCPCS | Performed by: FAMILY MEDICINE

## 2020-01-24 PROCEDURE — 80061 LIPID PANEL: CPT | Performed by: FAMILY MEDICINE

## 2020-01-24 RX ORDER — TRIAZOLAM 0.25 MG
TABLET ORAL
Qty: 30 TABLET | Refills: 5 | Status: SHIPPED | OUTPATIENT
Start: 2020-01-24 | End: 2020-09-29

## 2020-01-24 ASSESSMENT — PAIN SCALES - GENERAL: PAINLEVEL: NO PAIN (0)

## 2020-01-24 ASSESSMENT — MIFFLIN-ST. JEOR: SCORE: 1355.62

## 2020-01-24 NOTE — PATIENT INSTRUCTIONS
Preventive Health Recommendations:     See your health care provider every year to    Review health changes.     Discuss preventive care.      Review your medicines if your doctor has prescribed any.      Talk with your health care provider about whether you should have a test to screen for prostate cancer (PSA).    Every 3 years, have a diabetes test (fasting glucose). If you are at risk for diabetes, you should have this test more often.    Every 5 years, have a cholesterol test. Have this test more often if you are at risk for high cholesterol or heart disease.     Every 10 years, have a colonoscopy. Or, have a yearly FIT test (stool test). These exams will check for colon cancer.    Talk to with your health care provider about screening for Abdominal Aortic Aneurysm if you have a family history of AAA or have a history of smoking.    Shots:     Get a flu shot each year.     Get a tetanus shot every 10 years.     Talk to your doctor about your pneumonia vaccines. There are now two you should receive - Pneumovax (PPSV 23) and Prevnar (PCV 13).     Talk to your pharmacist about a shingles vaccine.     Talk to your doctor about the hepatitis B vaccine.  Nutrition:     Eat at least 5 servings of fruits and vegetables each day.     Eat whole-grain bread, whole-wheat pasta and brown rice instead of white grains and rice.     Get adequate Calcium and Vitamin D.   Lifestyle    Exercise for at least 150 minutes a week (30 minutes a day, 5 days a week). This will help you control your weight and prevent disease.     Limit alcohol to one drink per day.     No smoking.     Wear sunscreen to prevent skin cancer.    See your dentist every six months for an exam and cleaning.    See your eye doctor every 1 to 2 years to screen for conditions such as glaucoma, macular degeneration, cataracts, etc.    Personalized Prevention Plan  You are due for the preventive services outlined below.  Your care team is available to assist you  in scheduling these services.  If you have already completed any of these items, please share that information with your care team to update in your medical record.  Health Maintenance Due   Topic Date Due     Zoster (Shingles) Vaccine (1 of 2) 10/17/2002     Discuss Advance Care Planning  11/18/2016     Pneumococcal Vaccine (2 of 2 - PPSV23) 01/19/2019     Flu Vaccine (1) 09/01/2019     PHQ-2  01/01/2020     FALL RISK ASSESSMENT  01/22/2020     Annual Wellness Visit  01/22/2020

## 2020-01-24 NOTE — PROGRESS NOTES
"  SUBJECTIVE:   Carlos Soares is a 69 year old male who presents for Preventive Visit.    Are you in the first 12 months of your Medicare Part B coverage?  No    Physical Health:    In general, how would you rate your overall physical health? good    Outside of work, how many days during the week do you exercise? 4-5 days/week    Outside of work, approximately how many minutes a day do you exercise?15-30 minutes    If you drink alcohol do you typically have >3 drinks per day or >7 drinks per week? No    Do you usually eat at least 4 servings of fruit and vegetables a day, include whole grains & fiber and avoid regularly eating high fat or \"junk\" foods? Yes    Do you have any problems taking medications regularly?  No    Do you have any side effects from medications? none    Needs assistance for the following daily activities: no assistance needed    Which of the following safety concerns are present in your home?  none identified     Hearing impairment: No    In the past 6 months, have you been bothered by leaking of urine? no    Mental Health:    In general, how would you rate your overall mental or emotional health? excellent  PHQ-2 Score: 0    Do you feel safe in your environment? Yes    Have you ever done Advance Care Planning? (For example, a Health Directive, POLST, or a discussion with a medical provider or your loved ones about your wishes): No, advance care planning information given to patient to review.  Patient plans to discuss their wishes with loved ones or provider.      Additional concerns to address?  No    Fall risk:  Fallen 2 or more times in the past year?: No  Any fall with injury in the past year?: No    Cognitive Screenin) Repeat 3 items (Leader, Season, Table)    2) Clock draw: NORMAL  3) 3 item recall: Recalls 3 objects  Results: 3 items recalled: COGNITIVE IMPAIRMENT LESS LIKELY    Mini-CogTM Copyright ELAINA Rae. Licensed by the author for use in [facility]; reprinted " with permission (luis@[email or web address]). All rights reserved.      Do you have sleep apnea, excessive snoring or daytime drowsiness?: no          Reviewed and updated as needed this visit by clinical staff  Tobacco  Allergies  Meds  Med Hx  Surg Hx  Fam Hx  Soc Hx        Reviewed and updated as needed this visit by Provider        Social History     Tobacco Use     Smoking status: Current Every Day Smoker     Packs/day: 0.50     Years: 30.00     Pack years: 15.00     Types: Cigarettes     Smokeless tobacco: Never Used     Tobacco comment: 4-5 cigs daily   Substance Use Topics     Alcohol use: Yes     Comment: social                           Current providers sharing in care for this patient include:   Patient Care Team:  Karley Heart MD as PCP - General (Family Practice)  Karley Heart MD as Assigned PCP    The following health maintenance items are reviewed in Epic and correct as of today:  Health Maintenance   Topic Date Due     ZOSTER IMMUNIZATION (1 of 2) 10/17/2002     ADVANCE CARE PLANNING  11/18/2016     PNEUMOCOCCAL IMMUNIZATION 65+ LOW/MEDIUM RISK (2 of 2 - PPSV23) 01/19/2019     INFLUENZA VACCINE (1) 09/01/2019     PHQ-2  01/01/2020     FALL RISK ASSESSMENT  01/22/2020     MEDICARE ANNUAL WELLNESS VISIT  01/22/2020     LIPID  01/22/2024     DTAP/TDAP/TD IMMUNIZATION (3 - Td) 01/17/2027     COLONOSCOPY  04/19/2028     HEPATITIS C SCREENING  Completed     AORTIC ANEURYSM SCREENING (SYSTEM ASSIGNED)  Completed     IPV IMMUNIZATION  Aged Out     MENINGITIS IMMUNIZATION  Aged Out     Lab work is in process  Labs reviewed in EPIC  BP Readings from Last 3 Encounters:   01/24/20 120/68   12/24/19 125/89   11/19/19 124/81    Wt Readings from Last 3 Encounters:   01/24/20 59.4 kg (131 lb)   10/11/19 62.6 kg (138 lb)   08/20/19 61.7 kg (136 lb)         Here today for routine annual wellness exam.  Has been working with ENT and his dentist on his tooth abscess that was mimicking a sinus infection.   "Things much better    ROS:  CONSTITUTIONAL: NEGATIVE for fever, chills, change in weight  INTEGUMENTARY/SKIN: NEGATIVE for worrisome rashes, moles or lesions  EYES: NEGATIVE for vision changes or irritation  ENT/MOUTH: NEGATIVE for ear, mouth and throat problems  RESP: NEGATIVE for significant cough or SOB  BREAST: NEGATIVE for masses, tenderness or discharge  CV: NEGATIVE for chest pain, palpitations or peripheral edema  GI: NEGATIVE for nausea, abdominal pain, heartburn, or change in bowel habits  : NEGATIVE for frequency, dysuria, or hematuria  MUSCULOSKELETAL: NEGATIVE for significant arthralgias or myalgia  NEURO: NEGATIVE for weakness, dizziness or paresthesias  ENDOCRINE: NEGATIVE for temperature intolerance, skin/hair changes  HEME: NEGATIVE for bleeding problems  PSYCHIATRIC: NEGATIVE for changes in mood or affect    OBJECTIVE:   /68 (BP Location: Right arm, Patient Position: Chair, Cuff Size: Adult Large)   Pulse 97   Temp 98.4  F (36.9  C) (Oral)   Resp 18   Ht 1.746 m (5' 8.75\")   Wt 59.4 kg (131 lb)   SpO2 99%   BMI 19.49 kg/m   Estimated body mass index is 19.49 kg/m  as calculated from the following:    Height as of this encounter: 1.746 m (5' 8.75\").    Weight as of this encounter: 59.4 kg (131 lb).  EXAM:   GENERAL: healthy, alert and no distress  EYES: Eyes grossly normal to inspection, PERRL and conjunctivae and sclerae normal  HENT: ear canals and TM's normal, nose and mouth without ulcers or lesions  NECK: no adenopathy, no asymmetry, masses, or scars and thyroid normal to palpation  RESP: lungs clear to auscultation - no rales, rhonchi or wheezes  CV: regular rate and rhythm, normal S1 S2, no S3 or S4, no murmur, click or rub, no peripheral edema and peripheral pulses strong  ABDOMEN: soft, nontender, no hepatosplenomegaly, no masses and bowel sounds normal  RECTAL: normal sphincter tone, no rectal masses, prostate normal size, smooth, nontender without nodules or masses  MS: no " "gross musculoskeletal defects noted, no edema  SKIN: no suspicious lesions or rashes  NEURO: Normal strength and tone, mentation intact and speech normal  PSYCH: mentation appears normal, affect normal/bright    Diagnostic Test Results:  Labs reviewed in Epic    ASSESSMENT / PLAN:   1. Routine general medical examination at a health care facility  Flu shot and second pneumonia today  - Lipid panel reflex to direct LDL Fasting  - Glucose    2. Primary insomnia    - triazolam (HALCION) 0.25 MG tablet; TAKE 1 TABLET BY MOUTH EVERY NIGHT AT BEDTIME AS NEEDED  Dispense: 30 tablet; Refill: 5    3. Screening for prostate cancer    - Prostate spec antigen screen    COUNSELING:  Reviewed preventive health counseling, as reflected in patient instructions       Regular exercise       Healthy diet/nutrition       Vision screening       Hearing screening       Colon cancer screening       Prostate cancer screening    Estimated body mass index is 19.49 kg/m  as calculated from the following:    Height as of this encounter: 1.746 m (5' 8.75\").    Weight as of this encounter: 59.4 kg (131 lb).         reports that he has been smoking cigarettes. He has a 15.00 pack-year smoking history. He has never used smokeless tobacco.  Tobacco Cessation Action Plan: Self help information given to patient    Appropriate preventive services were discussed with this patient, including applicable screening as appropriate for cardiovascular disease, diabetes, osteopenia/osteoporosis, and glaucoma.  As appropriate for age/gender, discussed screening for colorectal cancer, prostate cancer, breast cancer, and cervical cancer. Checklist reviewing preventive services available has been given to the patient.    Reviewed patients plan of care and provided an AVS. The Basic Care Plan (routine screening as documented in Health Maintenance) for Carlos meets the Care Plan requirement. This Care Plan has been established and reviewed with the " Patient.    Counseling Resources:  ATP IV Guidelines  Pooled Cohorts Equation Calculator  Breast Cancer Risk Calculator  FRAX Risk Assessment  ICSI Preventive Guidelines  Dietary Guidelines for Americans, 2010  USDA's MyPlate  ASA Prophylaxis  Lung CA Screening    Karley Heart MD  [facility]

## 2020-01-24 NOTE — LETTER
[address]  [address]  [phone]    January 27, 2020      Carlos Soares  [address] [facility] [address] [facility] [address]      Dear Carlos,    Your lab work looks just fine.  Keep up the good work.       EDEN Heart MD

## 2020-02-25 ENCOUNTER — OFFICE VISIT (OUTPATIENT)
Dept: OTOLARYNGOLOGY | Facility: CLINIC | Age: 70
End: 2020-02-25
Payer: COMMERCIAL

## 2020-02-25 VITALS — DIASTOLIC BLOOD PRESSURE: 82 MMHG | OXYGEN SATURATION: 100 % | SYSTOLIC BLOOD PRESSURE: 131 MMHG | HEART RATE: 86 BPM

## 2020-02-25 DIAGNOSIS — J32.0 CHRONIC MAXILLARY SINUSITIS: Primary | ICD-10-CM

## 2020-02-25 PROCEDURE — 99213 OFFICE O/P EST LOW 20 MIN: CPT | Performed by: OTOLARYNGOLOGY

## 2020-02-25 NOTE — PROGRESS NOTES
HPI     This 69 year old patient is here for the f/u. We have reviewed the CT results together. I am glad to know that he is doing well. He still snores but no apnea. States that his voice does sound nasal to him. Denies any heartburn,  difficulty swallowing, post nasal drip, breathing issues, facial pressure, runny nose, sneezing or coughing. He is a current smoker one pack in 3 days. States that low dose steroid everyday controls his voice related symptoms. X-ray from April of this year showed no infection in his sinuses.    His CT of sinuses: Opacification of ethmoidal sinuses; layo bullosa in the right middle turbinate; septal deviation to the left; periapical lucency in the first maxillary molar with limited sinusitis in the left maxillary sinus.    Review of Systems   Constitutional: Negative.    HENT: Negative for ear discharge, ear pain, hearing loss and tinnitus.    Eyes: Negative for blurred vision and double vision.   Respiratory: Negative for cough, hemoptysis and sputum production.    Gastrointestinal: Negative for heartburn, nausea and vomiting.   Skin: Negative.    Neurological: Negative for dizziness, tingling, tremors and headaches.   Endo/Heme/Allergies: Negative for environmental allergies. Does not bruise/bleed easily.         Physical Exam  Vitals signs reviewed.   HENT:      Head: Normocephalic and atraumatic.      Right Ear: Hearing, tympanic membrane, ear canal and external ear normal. No decreased hearing noted. No laceration. There is no impacted cerumen.      Left Ear: Hearing, tympanic membrane, ear canal and external ear normal. No decreased hearing noted. No laceration. There is no impacted cerumen.      Nose: Septal deviation present. No rhinorrhea.      Mouth/Throat:      Mouth: Mucous membranes are moist.      Pharynx: Oropharynx is clear. Uvula midline.      Tonsils: No tonsillar exudate or tonsillar abscesses.   Eyes:      Extraocular Movements: Extraocular movements intact.       Pupils: Pupils are equal, round, and reactive to light.       Septum is deviated to the left. Right middle turbinate: layo bullosa. Inferior turbinates are enlarged.    A/P    This pleasant patient is having voice changes for more than a year. His CT of sinuses: Opacification of ethmoidal sinuses; layo bullosa in the right middle turbinate; septal deviation to the left. His odontogenic infection was taken care of. If the problem continues, options including septoplasty, turbinoplasty, correction of ostiomeatal units and layo bullosa will be considered. In the meantime, smoking cessation will be tried. His questions were answered.

## 2020-02-25 NOTE — NURSING NOTE
Carlos Soares's goals for this visit include:   Chief Complaint   Patient presents with     RECHECK     2 month f/u, sinuses     He requests these members of his care team be copied on today's visit information: Yes    PCP: Karley Heart    Referring Provider:  No referring provider defined for this encounter.    /82 (BP Location: Right arm, Patient Position: Sitting, Cuff Size: Adult Regular)   Pulse 86   SpO2 100%     Do you need any medication refills at today's visit? No    Chata Rasmussen LPN

## 2020-02-25 NOTE — LETTER
2/25/2020         RE: Carlos Soares  [address] [facility] [address]        Dear Colleague,    Thank you for referring your patient, Carlos Soares, to the [facility]. Please see a copy of my visit note below.    HPI     This 69 year old patient is here for the f/u. We have reviewed the CT results together. I am glad to know that he is doing well. He still snores but no apnea. States that his voice does sound nasal to him. Denies any heartburn,  difficulty swallowing, post nasal drip, breathing issues, facial pressure, runny nose, sneezing or coughing. He is a current smoker one pack in 3 days. States that low dose steroid everyday controls his voice related symptoms. X-ray from April of this year showed no infection in his sinuses.    His CT of sinuses: Opacification of ethmoidal sinuses; layo bullosa in the right middle turbinate; septal deviation to the left; periapical lucency in the first maxillary molar with limited sinusitis in the left maxillary sinus.    Review of Systems   Constitutional: Negative.    HENT: Negative for ear discharge, ear pain, hearing loss and tinnitus.    Eyes: Negative for blurred vision and double vision.   Respiratory: Negative for cough, hemoptysis and sputum production.    Gastrointestinal: Negative for heartburn, nausea and vomiting.   Skin: Negative.    Neurological: Negative for dizziness, tingling, tremors and headaches.   Endo/Heme/Allergies: Negative for environmental allergies. Does not bruise/bleed easily.         Physical Exam  Vitals signs reviewed.   HENT:      Head: Normocephalic and atraumatic.      Right Ear: Hearing, tympanic membrane, ear canal and external ear normal. No decreased hearing noted. No laceration. There is no impacted cerumen.      Left Ear: Hearing, tympanic membrane, ear canal and external ear normal. No decreased hearing noted. No laceration. There is no impacted cerumen.      Nose: Septal deviation  present. No rhinorrhea.      Mouth/Throat:      Mouth: Mucous membranes are moist.      Pharynx: Oropharynx is clear. Uvula midline.      Tonsils: No tonsillar exudate or tonsillar abscesses.   Eyes:      Extraocular Movements: Extraocular movements intact.      Pupils: Pupils are equal, round, and reactive to light.       Septum is deviated to the left. Right middle turbinate: layo bullosa. Inferior turbinates are enlarged.    A/P    This pleasant patient is having voice changes for more than a year. His CT of sinuses: Opacification of ethmoidal sinuses; layo bullosa in the right middle turbinate; septal deviation to the left. His odontogenic infection was taken care of. If the problem continues, options including septoplasty, turbinoplasty, correction of ostiomeatal units and layo bullosa will be considered. In the meantime, smoking cessation will be tried. His questions were answered.      Again, thank you for allowing me to participate in the care of your patient.        Sincerely,        Krystle Mercedes MD

## 2020-09-08 ENCOUNTER — MYC MEDICAL ADVICE (OUTPATIENT)
Dept: OTOLARYNGOLOGY | Facility: CLINIC | Age: 70
End: 2020-09-08

## 2020-09-08 ENCOUNTER — MYC MEDICAL ADVICE (OUTPATIENT)
Dept: FAMILY MEDICINE | Facility: CLINIC | Age: 70
End: 2020-09-08

## 2020-09-08 DIAGNOSIS — K21.9 GASTROESOPHAGEAL REFLUX DISEASE WITHOUT ESOPHAGITIS: ICD-10-CM

## 2020-09-08 RX ORDER — PANTOPRAZOLE SODIUM 40 MG/1
40 TABLET, DELAYED RELEASE ORAL DAILY
Qty: 60 TABLET | Refills: 1 | Status: SHIPPED | OUTPATIENT
Start: 2020-09-08 | End: 2020-09-29

## 2020-09-29 ENCOUNTER — OFFICE VISIT (OUTPATIENT)
Dept: FAMILY MEDICINE | Facility: CLINIC | Age: 70
End: 2020-09-29
Payer: COMMERCIAL

## 2020-09-29 VITALS
HEART RATE: 80 BPM | HEIGHT: 69 IN | TEMPERATURE: 98.4 F | BODY MASS INDEX: 18.51 KG/M2 | OXYGEN SATURATION: 99 % | DIASTOLIC BLOOD PRESSURE: 80 MMHG | WEIGHT: 125 LBS | SYSTOLIC BLOOD PRESSURE: 134 MMHG

## 2020-09-29 DIAGNOSIS — K21.9 GASTROESOPHAGEAL REFLUX DISEASE WITHOUT ESOPHAGITIS: ICD-10-CM

## 2020-09-29 DIAGNOSIS — F51.01 PRIMARY INSOMNIA: Primary | ICD-10-CM

## 2020-09-29 DIAGNOSIS — Z23 NEED FOR PROPHYLACTIC VACCINATION AND INOCULATION AGAINST INFLUENZA: ICD-10-CM

## 2020-09-29 PROCEDURE — 99214 OFFICE O/P EST MOD 30 MIN: CPT | Mod: 25 | Performed by: FAMILY MEDICINE

## 2020-09-29 PROCEDURE — 90662 IIV NO PRSV INCREASED AG IM: CPT | Performed by: FAMILY MEDICINE

## 2020-09-29 PROCEDURE — 90471 IMMUNIZATION ADMIN: CPT | Performed by: FAMILY MEDICINE

## 2020-09-29 RX ORDER — LORAZEPAM 1 MG/1
1 TABLET ORAL
Qty: 30 TABLET | Refills: 2 | Status: SHIPPED | OUTPATIENT
Start: 2020-09-29 | End: 2021-02-02

## 2020-09-29 RX ORDER — PANTOPRAZOLE SODIUM 40 MG/1
40 TABLET, DELAYED RELEASE ORAL DAILY
Qty: 90 TABLET | Refills: 1 | Status: SHIPPED | OUTPATIENT
Start: 2020-09-29 | End: 2021-02-02

## 2020-09-29 ASSESSMENT — MIFFLIN-ST. JEOR: SCORE: 1328.41

## 2020-09-29 NOTE — PATIENT INSTRUCTIONS
At [facility], we strive to deliver an exceptional experience to you, every time we see you. If you receive a survey, please complete it as we do value your feedback.  If you have MyChart, you can expect to receive results automatically within 24 hours of their completion.  Your provider will send a note interpreting your results as well.   If you do not have MyChart, you should receive your results in about a week by mail.    Your care team:                            Family Medicine Internal Medicine   MD Amilcar Souza MD Shantel Branch-Fleming, MD Srinivasa Vaka, MD Katya Georgiev PA-C Megan Hill, APRN CNP    Kush lÁvarez, MD Pediatrics   Sung Meyer, PALaishaC  Amber Fernandes, CNP MD Vera Proctor APRN CNP   MD Hyacinth Harvey MD Deborah Mielke, MD Jamaica Grier, APRN CNP  Kortney Ennis, PALaishaC  Jeanette Samaniego, CNP  MD Khushbu Golden MD Angela Wermerskirchen, MD      Clinic hours: Monday - Thursday 7 am-7 pm; Fridays 7 am-5 pm.   Urgent care: Monday - Friday 11 am-9 pm; Saturday and Sunday 9 am-5 pm.    Clinic: [phone]       [facility]: Monday - Thursday 8 am - 7 pm; Friday 8 am - 6 pm  [facility]: [phone]     Use [email or web address] for 24/7 diagnosis and treatment of dozens of conditions.

## 2020-09-29 NOTE — PROGRESS NOTES
Subjective     Carlos Soares is a 69 year old male who presents to clinic today for the following health issues:    HPI       GERD/Heartburn  Onset/Duration: Started fall 2019 and restarted 5-6 weeks ago   Description: Patient was having troubles last year. Patient has had this problem in the past and recently has been having heart burn in the last 5-6 weeks and has used protonix with relief.   Intensity: moderate  Progression of Symptoms: intermittent  Accompanying Signs & Symptoms:  Does it feel like food gets stuck or trouble swallowing: no  Nausea: no  Vomiting (bloody?): no  Abdominal Pain: no  Black-Tarry stools: no  Bloody stools: no  History:  Previous similar episodes: no  Previous ulcers: no  Precipitating factors:   Caffeine use: no  Alcohol use: no  NSAID/Aspirin use: no  Tobacco use: no  Worse with spicy foods.  Alleviating factors: Protonix with relief   Therapies tried and outcome:             Lifestyle changes: None            Medications: Protonix with relief     SUBJECTIVE:  Here today for some issues of reflux as above.  Patient is well-known to me and is previously been on Protonix for some silent laryngeal reflux.  Worked very well and he has not needed anything for a while until little over a month ago symptoms started and he is not sure exactly why it.  Was not sure if it is something he ate.  But in any case he started having a burning sensation in his chest and a sense of reflux.  No pain in his stomach no change in stools.  Not having any dysphasia or change in voice.  Had some leftover Protonix and started this and had very good relief within a couple of days.  Would like to continue with this for a bit.  Has intermittently used Halcion for sleep for many years, predating even be.  He uses perhaps 2 or 3 doses per week and never back-to-back nights.  But has been having more anxiety lately and admits that he tried a friend's Lorazepam tablet and thought perhaps he could change to this.   "He did a better job with anxiety and still helped with sleep.    Review of systems otherwise negative.  Past medical, family, and social history reviewed and updated in chart.    OBJECTIVE:  /80   Pulse 80   Temp 98.4  F (36.9  C) (Oral)   Ht 1.746 m (5' 8.75\")   Wt 56.7 kg (125 lb)   SpO2 99%   BMI 18.59 kg/m    Alert, pleasant, upbeat, and in no apparent discomfort.  S1 and S2 normal, no murmurs, clicks, gallops or rubs. Regular rate and rhythm. Chest is clear; no wheezes or rales. No edema or JVD.  The abdomen is soft without tenderness, guarding, mass, rebound or organomegaly. Bowel sounds are normal. No CVA tenderness or inguinal adenopathy noted.   Past labs reviewed with the patient.     ASSESSMENT / PLAN:  (F51.01) Primary insomnia  (primary encounter diagnosis)  Comment: We had a long discussion about this type of medication and why we are hesitant for ongoing use especially in older patients.  But I think the patient is very responsible with his use of benzodiazepine, never using more than 2 or perhaps 3 doses in a week.  So I am okay with making the switch and seeing how it goes.  Patient understands risks.  Plan: LORazepam (ATIVAN) 1 MG tablet            (K21.9) Gastroesophageal reflux disease without esophagitis  Comment: Discussed using this for a while and then stopping again to give himself a break.  Plan: pantoprazole (PROTONIX) 40 MG EC tablet            (Z23) Need for prophylactic vaccination and inoculation against influenza  Comment:   Plan: FLUZONE HIGH DOSE 65+  [88549]               Follow up due for recheck in about 3 months  EDEN Heart MD    (Chart documentation completed in part with Dragon voice-recognition software.  Even though reviewed some grammatical, spelling, and word errors may remain.)     "

## 2020-12-01 ENCOUNTER — OFFICE VISIT (OUTPATIENT)
Dept: FAMILY MEDICINE | Facility: CLINIC | Age: 70
End: 2020-12-01
Payer: COMMERCIAL

## 2020-12-01 VITALS
BODY MASS INDEX: 17.92 KG/M2 | OXYGEN SATURATION: 100 % | WEIGHT: 121 LBS | HEIGHT: 69 IN | SYSTOLIC BLOOD PRESSURE: 125 MMHG | HEART RATE: 102 BPM | TEMPERATURE: 97.3 F | DIASTOLIC BLOOD PRESSURE: 81 MMHG

## 2020-12-01 DIAGNOSIS — K21.9 GASTROESOPHAGEAL REFLUX DISEASE WITHOUT ESOPHAGITIS: ICD-10-CM

## 2020-12-01 DIAGNOSIS — K40.90 LEFT INGUINAL HERNIA: Primary | ICD-10-CM

## 2020-12-01 PROCEDURE — 99214 OFFICE O/P EST MOD 30 MIN: CPT | Performed by: FAMILY MEDICINE

## 2020-12-01 ASSESSMENT — MIFFLIN-ST. JEOR: SCORE: 1305.26

## 2020-12-01 ASSESSMENT — PAIN SCALES - GENERAL: PAINLEVEL: NO PAIN (0)

## 2020-12-01 NOTE — PROGRESS NOTES
"Subjective     Carlos Soares is a 70 year old male who presents to clinic today for the following health issues:    HPI         Concern - Inguinal Hernia   Onset: 2 months  Description: Left bump on groin area  Intensity: severe  Progression of Symptoms:  same  Accompanying Signs & Symptoms: Pain  Previous history of similar problem: None  Precipitating factors:        Worsened by: None  Alleviating factors:        Improved by: None  Therapies tried and outcome:  None. Patient had surgery on inguinal hernia (right) about 8-10 years ago.      SUBJECTIVE:  Here today with the above.  Patient is status post right inguinal hernia repair almost 15 years ago.  Went very well.  Couple of months ago he started noticing a nontender protrusion in the left inguinal region.  Has not really bothered him in any particular motions, lifting, etc.  No GI or  symptoms associated.  So he is considering having it repaired as he assumes its a hernia.  Just wanted confirmation.  We also have him on Protonix to help with some reflux symptoms that we discussed at his last visit.  This has really helped.  So we talked about duration of use, etc.    Review of systems otherwise negative.  Past medical, family, and social history reviewed and updated in chart.    OBJECTIVE:  /81 (BP Location: Left arm, Patient Position: Sitting, Cuff Size: Adult Regular)   Pulse 102   Temp 97.3  F (36.3  C) (Oral)   Ht 1.746 m (5' 8.75\")   Wt 54.9 kg (121 lb)   SpO2 100%   BMI 18.00 kg/m    Alert, pleasant, upbeat, and in no apparent discomfort.  S1 and S2 normal, no murmurs, clicks, gallops or rubs. Regular rate and rhythm. Chest is clear; no wheezes or rales. No edema or JVD.  abd -soft easily reducible moderate sized left inguinal hernia  Past labs reviewed with the patient.     ASSESSMENT / PLAN:  (K40.90) Left inguinal hernia  (primary encounter diagnosis)  Comment: We will get him in with general surgery to discuss repair, which he is " familiar with anyway.  He understands that most elective surgeries have been deferred for a little while and that is perfectly fine.  Plan: GENERAL SURG ADULT REFERRAL        I will see him back again for preop when scheduled    (K21.9) Gastroesophageal reflux disease without esophagitis  Comment: Okay to continue to use Protonix for now.  Has an upcoming checkup with me in February and we can discuss a little more than whether to stop or not  Plan:     Follow up as scheduled for his preop or a few months  S. Tico Heart MD    (Chart documentation completed in part with Dragon voice-recognition software.  Even though reviewed some grammatical, spelling, and word errors may remain.)

## 2020-12-10 ENCOUNTER — OFFICE VISIT (OUTPATIENT)
Dept: SURGERY | Facility: CLINIC | Age: 70
End: 2020-12-10
Attending: FAMILY MEDICINE
Payer: COMMERCIAL

## 2020-12-10 VITALS
HEART RATE: 110 BPM | HEIGHT: 68 IN | DIASTOLIC BLOOD PRESSURE: 75 MMHG | SYSTOLIC BLOOD PRESSURE: 112 MMHG | WEIGHT: 119 LBS | BODY MASS INDEX: 18.04 KG/M2

## 2020-12-10 DIAGNOSIS — K40.90 LEFT INGUINAL HERNIA: ICD-10-CM

## 2020-12-10 PROCEDURE — 99204 OFFICE O/P NEW MOD 45 MIN: CPT | Performed by: SURGERY

## 2020-12-10 ASSESSMENT — MIFFLIN-ST. JEOR: SCORE: 1284.28

## 2020-12-10 NOTE — LETTER
12/10/2020         RE: Carlos Soares  [address] [facility] [id number]        Dear Colleague,    Thank you for referring your patient, Carlos Soares, to the [facility]. Please see a copy of my visit note below.    Assessment: Primary, reducible left inguinal hernia.    Plan:    We have discussed observation, reduction techniques and importance, incarceration and strangulation signs, symptoms and importance as well as need to seek emergency treatment.      We have had a detailed discussion regarding the nature of hernias, surgery, indications, alternatives, risks, benefits, incisions, scarring, anesthesia, recovery, mesh, infection, bleeding, numbness, nerve damage and chronic pain, testicular loss, hernia recurrence, lifting and activity limitations after surgery.  All questions have been answered to the best of my ability.    Will call to schedule surgery if he decides to have surgery      HPI:  Carlos is a 70 year old male who I was asked to see regarding his left   inguinal hernia by Karley Heart. He presents for evaluation of left groin a bulge. It seems to vary. He first noticed it 2 months ago.  He denies pain, or discomfort. Negative for associated symptoms of nausea, vomiting and constipation.  He has not had a previous herniorrhaphy in this location. However about 8-10 yrs ago he had a right inguinal hernia repair.     Constipation: No  Colonoscopy: No   Dysuria: No  Cough: No  Diabetes: No  Current smoker: Yes  Employment does not require heavy lifting.    Past Medical History:   has a past medical history of Advanced directives, counseling/discussion (11/18/2011), Diverticulosis, ED (erectile dysfunction) (10/22/2010), Hemorrhoids, Insomnia, and Tobacco abuse (11/18/2011).    Past Surgical History:  Past Surgical History:   Procedure Laterality Date     C DESTRUCTION HEMORRHOIDS,INT/EXTERN  2008, 2009     COLONOSCOPY N/A 4/19/2018    Procedure:  COMBINED COLONOSCOPY, SINGLE OR MULTIPLE BIOPSY/POLYPECTOMY BY BIOPSY;;  Surgeon: Duane, William Charles, MD;  Location: [facility] OR     COLONOSCOPY WITH CO2 INSUFFLATION N/A 4/19/2018    Procedure: COLONOSCOPY WITH CO2 INSUFFLATION;  Karley Heart MD/Special screening for malignant neoplasms, colon/Colonoscopy/bmi 39.84/[facility] [place] Fax#[phone];  Surgeon: Duane, William Charles, MD;  Location: [facility] OR     HC REPAIR SLIDING INGUINAL HERNIA  12/28/07      Additional abdominal operations: none    Social History:  Social History     Socioeconomic History     Marital status:      Spouse name:      Number of children: Not on file     Years of education: Not on file     Highest education level: Not on file   Occupational History     Employer: [employer]   Social Needs     Financial resource strain: Not on file     Food insecurity     Worry: Not on file     Inability: Not on file     Transportation needs     Medical: Not on file     Non-medical: Not on file   Tobacco Use     Smoking status: Current Every Day Smoker     Packs/day: 0.50     Years: 30.00     Pack years: 15.00     Types: Cigarettes     Smokeless tobacco: Never Used     Tobacco comment: 4-5 cigs daily   Substance and Sexual Activity     Alcohol use: Yes     Comment: social     Drug use: No     Sexual activity: Yes     Partners: Female   Lifestyle     Physical activity     Days per week: Not on file     Minutes per session: Not on file     Stress: Not on file   Relationships     Social connections     Talks on phone: Not on file     Gets together: Not on file     Attends Christian service: Not on file     Active member of club or organization: Not on file     Attends meetings of clubs or organizations: Not on file     Relationship status: Not on file     Intimate partner violence     Fear of current or ex partner: Not on file     Emotionally abused: Not on file     Physically abused: Not on file     Forced sexual activity: Not  "on file   Other Topics Concern     Parent/sibling w/ CABG, MI or angioplasty before 65F 55M? No      Service No     Blood Transfusions Not Asked     Caffeine Concern Not Asked     Occupational Exposure Not Asked     Hobby Hazards Not Asked     Sleep Concern Yes     Comment: Tx'd     Stress Concern No     Weight Concern No     Special Diet Not Asked     Back Care Not Asked     Exercise Not Asked     Bike Helmet Not Asked     Seat Belt Not Asked     Self-Exams Not Asked   Social History Narrative            Family History:  Family History   Problem Relation Age of Onset     Cerebrovascular Disease Father      Hernias: Yes - older brother    ROS:  10 point ROS neg other than the symptoms noted above in the HPI.      PE:   Vitals: /75   Pulse 110   Ht 1.727 m (5' 8\")   Wt 54 kg (119 lb)   BMI 18.09 kg/m    BMI= Body mass index is 18.09 kg/m .  Constitutional: healthy, alert and no distress  Eyes: Pupils round and equal, no icterus   ENT: Mucous membranes moist  Respiratory:  Non-labored respiration  Gastrointestinal: Abdomen soft, non-tender. BS normal. No masses, organomegaly  Musculoskeletal: No gross deformity  Neurologic: No gross focal deficits  Psychiatric: mentation appears normal and affect normal/bright  Hematologic/Lymphatic/Immunologic: No bruising noted  Skin: No lesions, rashes, erythema or jaundice noted    Hernia- Left inguinal hernia is present spontaneously              Right inguinal hernia is not present with valsalva              The hernia is manually reducible              Testes are descended bilaterally and normal    Michael Chang, DO      Again, thank you for allowing me to participate in the care of your patient.        Sincerely,        Michael Chang, DO    "

## 2020-12-10 NOTE — PROGRESS NOTES
Assessment: Primary, reducible left inguinal hernia.    Plan:    We have discussed observation, reduction techniques and importance, incarceration and strangulation signs, symptoms and importance as well as need to seek emergency treatment.      We have had a detailed discussion regarding the nature of hernias, surgery, indications, alternatives, risks, benefits, incisions, scarring, anesthesia, recovery, mesh, infection, bleeding, numbness, nerve damage and chronic pain, testicular loss, hernia recurrence, lifting and activity limitations after surgery.  All questions have been answered to the best of my ability.    Will call to schedule surgery if he decides to have surgery      HPI:  Carlos is a 70 year old male who I was asked to see regarding his left   inguinal hernia by Karely Heart. He presents for evaluation of left groin a bulge. It seems to vary. He first noticed it 2 months ago.  He denies pain, or discomfort. Negative for associated symptoms of nausea, vomiting and constipation.  He has not had a previous herniorrhaphy in this location. However about 8-10 yrs ago he had a right inguinal hernia repair.     Constipation: No  Colonoscopy: No   Dysuria: No  Cough: No  Diabetes: No  Current smoker: Yes  Employment does not require heavy lifting.    Past Medical History:   has a past medical history of Advanced directives, counseling/discussion (11/18/2011), Diverticulosis, ED (erectile dysfunction) (10/22/2010), Hemorrhoids, Insomnia, and Tobacco abuse (11/18/2011).    Past Surgical History:  Past Surgical History:   Procedure Laterality Date     C DESTRUCTION HEMORRHOIDS,INT/EXTERN  2008, 2009     COLONOSCOPY N/A 4/19/2018    Procedure: COMBINED COLONOSCOPY, SINGLE OR MULTIPLE BIOPSY/POLYPECTOMY BY BIOPSY;;  Surgeon: Duane, William Charles, MD;  Location: [facility]     COLONOSCOPY WITH CO2 INSUFFLATION N/A 4/19/2018    Procedure: COLONOSCOPY WITH CO2 INSUFFLATION;  Karley Heart MD/Special  screening for malignant neoplasms, colon/Colonoscopy/bmi 39.[address]/[facility] [place] Fax#[phone];  Surgeon: Duane, William Charles, MD;  Location: [facility] OR     HC REPAIR SLIDING INGUINAL HERNIA  12/28/07      Additional abdominal operations: none    Social History:  Social History     Socioeconomic History     Marital status:      Spouse name:      Number of children: Not on file     Years of education: Not on file     Highest education level: Not on file   Occupational History     Employer: [employer]   Social Needs     Financial resource strain: Not on file     Food insecurity     Worry: Not on file     Inability: Not on file     Transportation needs     Medical: Not on file     Non-medical: Not on file   Tobacco Use     Smoking status: Current Every Day Smoker     Packs/day: 0.50     Years: 30.00     Pack years: 15.00     Types: Cigarettes     Smokeless tobacco: Never Used     Tobacco comment: 4-5 cigs daily   Substance and Sexual Activity     Alcohol use: Yes     Comment: social     Drug use: No     Sexual activity: Yes     Partners: Female   Lifestyle     Physical activity     Days per week: Not on file     Minutes per session: Not on file     Stress: Not on file   Relationships     Social connections     Talks on phone: Not on file     Gets together: Not on file     Attends Holiness service: Not on file     Active member of club or organization: Not on file     Attends meetings of clubs or organizations: Not on file     Relationship status: Not on file     Intimate partner violence     Fear of current or ex partner: Not on file     Emotionally abused: Not on file     Physically abused: Not on file     Forced sexual activity: Not on file   Other Topics Concern     Parent/sibling w/ CABG, MI or angioplasty before 65F 55M? No      Service No     Blood Transfusions Not Asked     Caffeine Concern Not Asked     Occupational Exposure Not Asked     Hobby Hazards Not Asked     Sleep  "Concern Yes     Comment: Tx'd     Stress Concern No     Weight Concern No     Special Diet Not Asked     Back Care Not Asked     Exercise Not Asked     Bike Helmet Not Asked     Seat Belt Not Asked     Self-Exams Not Asked   Social History Narrative            Family History:  Family History   Problem Relation Age of Onset     Cerebrovascular Disease Father      Hernias: Yes - older brother    ROS:  10 point ROS neg other than the symptoms noted above in the HPI.      PE:   Vitals: /75   Pulse 110   Ht 1.727 m (5' 8\")   Wt 54 kg (119 lb)   BMI 18.09 kg/m    BMI= Body mass index is 18.09 kg/m .  Constitutional: healthy, alert and no distress  Eyes: Pupils round and equal, no icterus   ENT: Mucous membranes moist  Respiratory:  Non-labored respiration  Gastrointestinal: Abdomen soft, non-tender. BS normal. No masses, organomegaly  Musculoskeletal: No gross deformity  Neurologic: No gross focal deficits  Psychiatric: mentation appears normal and affect normal/bright  Hematologic/Lymphatic/Immunologic: No bruising noted  Skin: No lesions, rashes, erythema or jaundice noted    Hernia- Left inguinal hernia is present spontaneously              Right inguinal hernia is not present with valsalva              The hernia is manually reducible              Testes are descended bilaterally and normal    Michael Chang DO  "

## 2021-02-02 ENCOUNTER — OFFICE VISIT (OUTPATIENT)
Dept: FAMILY MEDICINE | Facility: CLINIC | Age: 71
End: 2021-02-02
Payer: COMMERCIAL

## 2021-02-02 VITALS
WEIGHT: 113.2 LBS | HEART RATE: 83 BPM | DIASTOLIC BLOOD PRESSURE: 78 MMHG | HEIGHT: 69 IN | OXYGEN SATURATION: 99 % | RESPIRATION RATE: 14 BRPM | SYSTOLIC BLOOD PRESSURE: 126 MMHG | BODY MASS INDEX: 16.77 KG/M2

## 2021-02-02 DIAGNOSIS — K21.9 GASTROESOPHAGEAL REFLUX DISEASE WITHOUT ESOPHAGITIS: ICD-10-CM

## 2021-02-02 DIAGNOSIS — Z13.220 SCREENING CHOLESTEROL LEVEL: ICD-10-CM

## 2021-02-02 DIAGNOSIS — Z00.00 ENCOUNTER FOR MEDICARE ANNUAL WELLNESS EXAM: Primary | ICD-10-CM

## 2021-02-02 DIAGNOSIS — R63.4 WEIGHT LOSS: ICD-10-CM

## 2021-02-02 DIAGNOSIS — Z12.5 PROSTATE CANCER SCREENING: ICD-10-CM

## 2021-02-02 DIAGNOSIS — F51.01 PRIMARY INSOMNIA: ICD-10-CM

## 2021-02-02 LAB
ALBUMIN SERPL-MCNC: 3.9 G/DL (ref 3.4–5)
ALP SERPL-CCNC: 47 U/L (ref 40–150)
ALT SERPL W P-5'-P-CCNC: 26 U/L (ref 0–70)
ANION GAP SERPL CALCULATED.3IONS-SCNC: 1 MMOL/L (ref 3–14)
AST SERPL W P-5'-P-CCNC: 19 U/L (ref 0–45)
BILIRUB SERPL-MCNC: 0.6 MG/DL (ref 0.2–1.3)
BUN SERPL-MCNC: 13 MG/DL (ref 7–30)
CALCIUM SERPL-MCNC: 9.4 MG/DL (ref 8.5–10.1)
CHLORIDE SERPL-SCNC: 109 MMOL/L (ref 94–109)
CHOLEST SERPL-MCNC: 175 MG/DL
CO2 SERPL-SCNC: 32 MMOL/L (ref 20–32)
CREAT SERPL-MCNC: 1.01 MG/DL (ref 0.66–1.25)
ERYTHROCYTE [DISTWIDTH] IN BLOOD BY AUTOMATED COUNT: 13.8 % (ref 10–15)
GFR SERPL CREATININE-BSD FRML MDRD: 76 ML/MIN/{1.73_M2}
GLUCOSE SERPL-MCNC: 90 MG/DL (ref 70–99)
HCT VFR BLD AUTO: 42.9 % (ref 40–53)
HDLC SERPL-MCNC: 78 MG/DL
HGB BLD-MCNC: 14.2 G/DL (ref 13.3–17.7)
LDLC SERPL CALC-MCNC: 78 MG/DL
MCH RBC QN AUTO: 32.3 PG (ref 26.5–33)
MCHC RBC AUTO-ENTMCNC: 33.1 G/DL (ref 31.5–36.5)
MCV RBC AUTO: 98 FL (ref 78–100)
NONHDLC SERPL-MCNC: 97 MG/DL
PLATELET # BLD AUTO: 201 10E9/L (ref 150–450)
POTASSIUM SERPL-SCNC: 4.4 MMOL/L (ref 3.4–5.3)
PROT SERPL-MCNC: 6.9 G/DL (ref 6.8–8.8)
PSA SERPL-ACNC: 1.22 UG/L (ref 0–4)
RBC # BLD AUTO: 4.39 10E12/L (ref 4.4–5.9)
SODIUM SERPL-SCNC: 142 MMOL/L (ref 133–144)
TRIGL SERPL-MCNC: 93 MG/DL
TSH SERPL DL<=0.005 MIU/L-ACNC: 0.92 MU/L (ref 0.4–4)
WBC # BLD AUTO: 5.8 10E9/L (ref 4–11)

## 2021-02-02 PROCEDURE — 85027 COMPLETE CBC AUTOMATED: CPT | Performed by: FAMILY MEDICINE

## 2021-02-02 PROCEDURE — 80053 COMPREHEN METABOLIC PANEL: CPT | Performed by: FAMILY MEDICINE

## 2021-02-02 PROCEDURE — 80061 LIPID PANEL: CPT | Performed by: FAMILY MEDICINE

## 2021-02-02 PROCEDURE — 99213 OFFICE O/P EST LOW 20 MIN: CPT | Mod: 25 | Performed by: FAMILY MEDICINE

## 2021-02-02 PROCEDURE — G0103 PSA SCREENING: HCPCS | Performed by: FAMILY MEDICINE

## 2021-02-02 PROCEDURE — G0439 PPPS, SUBSEQ VISIT: HCPCS | Performed by: FAMILY MEDICINE

## 2021-02-02 PROCEDURE — 36415 COLL VENOUS BLD VENIPUNCTURE: CPT | Performed by: FAMILY MEDICINE

## 2021-02-02 PROCEDURE — 84443 ASSAY THYROID STIM HORMONE: CPT | Performed by: FAMILY MEDICINE

## 2021-02-02 RX ORDER — TRIAZOLAM 0.25 MG
0.25 TABLET ORAL AT BEDTIME
Qty: 30 TABLET | Refills: 2 | Status: SHIPPED | OUTPATIENT
Start: 2021-02-02 | End: 2021-12-15

## 2021-02-02 RX ORDER — PANTOPRAZOLE SODIUM 40 MG/1
40 TABLET, DELAYED RELEASE ORAL DAILY
Qty: 90 TABLET | Refills: 3 | Status: SHIPPED | OUTPATIENT
Start: 2021-02-02 | End: 2022-02-09

## 2021-02-02 RX ORDER — LORAZEPAM 1 MG/1
1 TABLET ORAL
Qty: 30 TABLET | Refills: 2 | Status: SHIPPED | OUTPATIENT
Start: 2021-02-02 | End: 2021-11-09

## 2021-02-02 ASSESSMENT — MIFFLIN-ST. JEOR: SCORE: 1265.91

## 2021-02-02 ASSESSMENT — ACTIVITIES OF DAILY LIVING (ADL): CURRENT_FUNCTION: NO ASSISTANCE NEEDED

## 2021-02-02 NOTE — PROGRESS NOTES
"SUBJECTIVE:   Carlos Soares is a 70 year old male who presents for Preventive Visit.    Patient has been advised of split billing requirements and indicates understanding: Yes   Are you in the first 12 months of your Medicare coverage?  No    Healthy Habits:    In general, how would you rate your overall health?  Very good    Frequency of exercise:  2-3 days/week    Duration of exercise:  Less than 15 minutes    Do you usually eat at least 4 servings of fruit and vegetables a day, include whole grains    & fiber and avoid regularly eating high fat or \"junk\" foods?  No    Taking medications regularly:  No    Barriers to taking medications:  None    Medication side effects:  None    Ability to successfully perform activities of daily living:  No assistance needed    Home Safety:  No safety concerns identified    Hearing Impairment:  No hearing concerns    In the past 6 months, have you been bothered by leaking of urine?  No    In general, how would you rate your overall mental or emotional health?  Very good      PHQ-2 Total Score:    Additional concerns today:  Yes    Do you feel safe in your environment? Yes    Have you ever done Advance Care Planning? (For example, a Health Directive, POLST, or a discussion with a medical provider or your loved ones about your wishes):        Fall risk  Fallen 2 or more times in the past year?: No  Any fall with injury in the past year?: No    Cognitive Screening   1) Repeat 3 items (Leader, Season, Table)    2) Clock draw: NORMAL  3) 3 item recall: Recalls 3 objects  Results: 3 items recalled: COGNITIVE IMPAIRMENT LESS LIKELY    Mini-CogTM Copyright ELAINA Rae. Licensed by the author for use in [facility]; reprinted with permission (luis@.[facility]). All rights reserved.      Do you have sleep apnea, excessive snoring or daytime drowsiness?: no    Reviewed and updated as needed this visit by clinical staff  Tobacco  Allergies  Meds  Problems  Med Hx  Surg Hx  Fam Hx "          Reviewed and updated as needed this visit by Provider  Tobacco  Allergies  Meds  Problems  Med Hx  Surg Hx  Fam Hx         Social History     Tobacco Use     Smoking status: Current Every Day Smoker     Packs/day: 0.50     Years: 30.00     Pack years: 15.00     Types: Cigarettes     Smokeless tobacco: Never Used     Tobacco comment: 4-5 cigs daily   Substance Use Topics     Alcohol use: Yes     Comment: social     If you drink alcohol do you typically have >3 drinks per day or >7 drinks per week? No    Alcohol Use 2/2/2021   Prescreen: >3 drinks/day or >7 drinks/week? No         Stomach concerns- reflux and hernia   Alprazolam-questions for sleeping issues     Current providers sharing in care for this patient include:   Patient Care Team:  Karley Heart MD as PCP - General (Family Practice)  Karley Heart MD as Assigned PCP  Krystle Mercedes MD as Assigned Surgical Provider    The following health maintenance items are reviewed in Epic and correct as of today:  Health Maintenance   Topic Date Due     ZOSTER IMMUNIZATION (1 of 2) 10/17/2002     FALL RISK ASSESSMENT  01/24/2021     MEDICARE ANNUAL WELLNESS VISIT  02/02/2022     LIPID  01/24/2025     ADVANCE CARE PLANNING  01/24/2025     DTAP/TDAP/TD IMMUNIZATION (3 - Td) 01/17/2027     COLORECTAL CANCER SCREENING  04/19/2028     HEPATITIS C SCREENING  Completed     PHQ-2  Completed     INFLUENZA VACCINE  Completed     Pneumococcal Vaccine: 65+ Years  Completed     AORTIC ANEURYSM SCREENING (SYSTEM ASSIGNED)  Completed     Pneumococcal Vaccine: Pediatrics (0 to 5 Years) and At-Risk Patients (6 to 64 Years)  Aged Out     IPV IMMUNIZATION  Aged Out     MENINGITIS IMMUNIZATION  Aged Out     HEPATITIS B IMMUNIZATION  Aged Out     Lab work is in process  Labs reviewed in EPIC  BP Readings from Last 3 Encounters:   02/02/21 126/78   12/10/20 112/75   12/01/20 125/81    Wt Readings from Last 3 Encounters:   02/02/21 51.3 kg (113 lb  "3.2 oz)   12/10/20 54 kg (119 lb)   12/01/20 54.9 kg (121 lb)         Here today for routine wellness exam.  Patient also has some separately identifiable issues to discuss, specifically:   1) reflux.  Protonix does seem to be working.  Occasional Rolaids at night.  Has made a concerted effort to change his diet which has led to  2) 20 pound weight loss.  Patient says his portions and type of foods have changed.  3) insomnia.  He did very well on Halcion in the past but sometimes it made him too drowsy.  So we changed lorazepam which is work but sometimes he wants a bit heavier sleep.  So we discussed alternating these.    Review of Systems  CONSTITUTIONAL: NEGATIVE for fever, chills, change in weight  INTEGUMENTARY/SKIN: NEGATIVE for worrisome rashes, moles or lesions  EYES: NEGATIVE for vision changes or irritation  ENT/MOUTH: NEGATIVE for ear, mouth and throat problems  RESP: NEGATIVE for significant cough or SOB  BREAST: NEGATIVE for masses, tenderness or discharge  CV: NEGATIVE for chest pain, palpitations or peripheral edema  GI: NEGATIVE for nausea, abdominal pain, heartburn, or change in bowel habits  : NEGATIVE for frequency, dysuria, or hematuria  MUSCULOSKELETAL: NEGATIVE for significant arthralgias or myalgia  NEURO: NEGATIVE for weakness, dizziness or paresthesias  ENDOCRINE: NEGATIVE for temperature intolerance, skin/hair changes  HEME: NEGATIVE for bleeding problems  PSYCHIATRIC: NEGATIVE for changes in mood or affect    OBJECTIVE:   /78   Pulse 83   Resp 14   Ht 1.74 m (5' 8.5\")   Wt 51.3 kg (113 lb 3.2 oz)   SpO2 99%   BMI 16.96 kg/m   Estimated body mass index is 16.96 kg/m  as calculated from the following:    Height as of this encounter: 1.74 m (5' 8.5\").    Weight as of this encounter: 51.3 kg (113 lb 3.2 oz).  Physical Exam  GENERAL: healthy, alert and no distress  EYES: Eyes grossly normal to inspection, PERRL and conjunctivae and sclerae normal  HENT: ear canals and TM's normal, " nose and mouth without ulcers or lesions  NECK: no adenopathy, no asymmetry, masses, or scars and thyroid normal to palpation  RESP: lungs clear to auscultation - no rales, rhonchi or wheezes  CV: regular rate and rhythm, normal S1 S2, no S3 or S4, no murmur, click or rub, no peripheral edema and peripheral pulses strong  ABDOMEN: soft, nontender, no hepatosplenomegaly, no masses and bowel sounds normal  MS: no gross musculoskeletal defects noted, no edema  SKIN: no suspicious lesions or rashes  NEURO: Normal strength and tone, mentation intact and speech normal  PSYCH: mentation appears normal, affect normal/bright    Diagnostic Test Results:  Labs reviewed in Epic    ASSESSMENT / PLAN:   1. Encounter for Medicare annual wellness exam  Healthcare maintenance up-to-date otherwise.  Discussed waiting on the shingles vaccine until after Covid    2. Gastroesophageal reflux disease without esophagitis  Stable on current regimen.  Continue same plan and routine follow-up.   - pantoprazole (PROTONIX) 40 MG EC tablet; Take 1 tablet (40 mg) by mouth daily  Dispense: 90 tablet; Refill: 3  - OFFICE/OUTPT VISIT,EST,LEVL III    3. Primary insomnia  Okay to alternate  - LORazepam (ATIVAN) 1 MG tablet; Take 1 tablet (1 mg) by mouth nightly as needed for sleep  Dispense: 30 tablet; Refill: 2  - triazolam (HALCION) 0.25 MG tablet; Take 1 tablet (0.25 mg) by mouth At Bedtime  Dispense: 30 tablet; Refill: 2  - OFFICE/OUTPT VISIT,EST,LEVL III    4. Weight loss  I want to make sure iron levels, protein levels, thyroid are  - CBC with platelets  - TSH with free T4 reflex  - Comprehensive metabolic panel  - OFFICE/OUTPT VISIT,EST,LEVL III    5. Screening cholesterol level    - Lipid panel reflex to direct LDL Fasting    6. Prostate cancer screening    - Prostate spec antigen screen    Patient has been advised of split billing requirements and indicates understanding: Yes  COUNSELING:  Reviewed preventive health counseling, as reflected  "in patient instructions       Regular exercise       Healthy diet/nutrition       Vision screening       Colon cancer screening       Prostate cancer screening    Estimated body mass index is 16.96 kg/m  as calculated from the following:    Height as of this encounter: 1.74 m (5' 8.5\").    Weight as of this encounter: 51.3 kg (113 lb 3.2 oz).    Weight management plan Discussed as above    He reports that he has been smoking cigarettes. He has a 15.00 pack-year smoking history. He has never used smokeless tobacco.  Tobacco Cessation Action Plan:   Information offered: Patient not interested at this time      Appropriate preventive services were discussed with this patient, including applicable screening as appropriate for cardiovascular disease, diabetes, osteopenia/osteoporosis, and glaucoma.  As appropriate for age/gender, discussed screening for colorectal cancer, prostate cancer, breast cancer, and cervical cancer. Checklist reviewing preventive services available has been given to the patient.    Reviewed patients plan of care and provided an AVS. The Basic Care Plan (routine screening as documented in Health Maintenance) for Carlos meets the Care Plan requirement. This Care Plan has been established and reviewed with the Patient.    Counseling Resources:  ATP IV Guidelines  Pooled Cohorts Equation Calculator  Breast Cancer Risk Calculator  Breast Cancer: Medication to Reduce Risk  FRAX Risk Assessment  ICSI Preventive Guidelines  Dietary Guidelines for Americans, 2010  [employer]'s MyPlate  ASA Prophylaxis  Lung CA Screening    Karley Heart MD  [facility]    Identified Health Risks:  "

## 2021-02-02 NOTE — PATIENT INSTRUCTIONS
Patient Education   Personalized Prevention Plan  You are due for the preventive services outlined below.  Your care team is available to assist you in scheduling these services.  If you have already completed any of these items, please share that information with your care team to update in your medical record.  Health Maintenance Due   Topic Date Due     Zoster (Shingles) Vaccine (1 of 2) 10/17/2002     PHQ-2  01/01/2021     FALL RISK ASSESSMENT  01/24/2021     Annual Wellness Visit  01/24/2021        Patient Education   Personalized Prevention Plan  You are due for the preventive services outlined below.  Your care team is available to assist you in scheduling these services.  If you have already completed any of these items, please share that information with your care team to update in your medical record.  Health Maintenance Due   Topic Date Due     Zoster (Shingles) Vaccine (1 of 2) 10/17/2002     FALL RISK ASSESSMENT  01/24/2021       Understanding USDA MyPlate  The USDA has guidelines to help you make healthy food choices. These are called MyPlate. MyPlate shows the food groups that make up healthy meals using the image of a place setting. Before you eat, think about the healthiest choices for what to put on your plate or in your cup or bowl. To learn more about building a healthy plate, visit www.choosemyplate.gov.     The food groups    Fruits. Any fruit or 100% fruit juice counts as part of the Fruit Group. Fruits may be fresh, canned, frozen, or dried, and may be whole, cut-up, or pureed. Make 1/2 of your plate fruits and vegetables.    Vegetables. Any vegetable or 100% vegetable juice counts as a member of the Vegetable Group. Vegetables may be fresh, frozen, canned, or dried. They can be served raw or cooked and may be whole, cut-up, or mashed. Make 1/2 of your plate fruits and vegetables.    Grains. All foods made from grains are part of the Grains Group. These include wheat, rice, oats, cornmeal,  and barley. Grains are often used to make foods such as bread, pasta, oatmeal, cereal, tortillas, and grits. Grains should be no more than 1/4 of your plate. At least half of your grains should be whole grains.    Protein. This group includes meat, poultry, seafood, beans and peas, eggs, processed soy products (such as tofu), nuts (including nut butters), and seeds. Make protein choices no more than 1/4 of your plate. Meat and poultry choices should be lean or low fat.    Dairy. The Dairy Group includes all fluid milk products and foods made from milk that contain calcium, such as yogurt and cheese. (Foods that have little calcium, such as cream, butter, and cream cheese, are not part of this group.) Most dairy choices should be low-fat or fat-free.    Oils. Oils aren't a food group, but they do contain essential nutrients. However it's important to watch your intake of oils. These are fats that are liquid at room temperature. They include canola, corn, olive, soybean, vegetable, and sunflower oil. Foods that are mainly oil include mayonnaise, certain salad dressings, and soft margarines. You likely already get your daily oil allowance from the foods you eat.  Things to limit  Eating healthy also means limiting these things in your diet:    Salt (sodium). Many processed foods have a lot of sodium. To keep sodium intake down, eat fresh vegetables, meats, poultry, and seafood when possible. Purchase low-sodium, reduced-sodium, or no-salt-added food products at the store. And don't add salt to your meals at home. Instead, season them with herbs and spices such as dill, oregano, cumin, and paprika. Or try adding flavor with lemon or lime zest and juice.    Saturated fat. Saturated fats are most often found in animal products such as beef, pork, and chicken. They are often solid at room temperature, such as butter. To reduce your saturated fat intake, choose leaner cuts of meat and poultry. And try healthier cooking  methods such as grilling, broiling, roasting, or baking. For a simple lower-fat swap, use plain nonfat yogurt instead of mayonnaise when making potato salad or macaroni salad.    Added sugars. These are sugars added to foods. They are in foods such as ice cream, candy, soda, fruit drinks, sports drinks, energy drinks, cookies, pastries, jams, and syrups. Cut down on added sugars by sharing sweet treats with a family member or friend. You can also choose fruit for dessert, and drink water or other unsweetened beverages.  R[employer] last reviewed this educational content on 6/1/2020 2000-2020 [employer]. [address] [place] [address], [place] [address]. All rights reserved. This information is not intended as a substitute for professional medical care. Always follow your healthcare professional's instructions.

## 2021-02-03 NOTE — RESULT ENCOUNTER NOTE
Carlos,  Lab work looks great overall.  Totally normal.  Certainly nothing to explain any weight loss or any problems causing the weight loss.  So I think it comes down to eating as we discussed.  EDEN Heart M.D.

## 2021-06-22 NOTE — PROGRESS NOTES
Assessment & Plan     Weight loss  Patient with 20+ pound weight loss during the past year and we have discussed this in the past.  Things have stabilized but not really increased.  Has increased portion size.  But if he eats late at night he gets worse and dyspepsia which is led to some changes of voice.  That is why he is on the Protonix but sometimes finds he needs to use additional Pepcid Complete.  Given the significant weight loss and other associated symptoms as well as his years of smoking I do think he is at risk for a variety of tumors as well, some of which can be endocrine secreting.  I think a good starting point would be a CT scan of chest abdomen and pelvis.  If normal, combined with normal lab work, this will be reassuring.  Still consideration for GI referral.  - CT Chest Abdomen Pelvis w/o & w Contrast; Future    Gastroesophageal reflux disease without esophagitis  As above  - CT Chest Abdomen Pelvis w/o & w Contrast; Future    Tobacco abuse  As above  - CT Chest Abdomen Pelvis w/o & w Contrast; Future       See Patient Instructions    Return in about 2 weeks (around 7/7/2021) for Based upon test results.    Karley Heart MD  [facility] MARIANGEL Gonzalez is a 70 year old who presents for the following health issues     History of Present Illness       He eats 0-1 servings of fruits and vegetables daily.He consumes 1 sweetened beverage(s) daily.He exercises with enough effort to increase his heart rate 9 or less minutes per day.  He exercises with enough effort to increase his heart rate 4 days per week.   He is taking medications regularly.       Anxiety Follow-Up    How are you doing with your anxiety since your last visit? Improved     Are you having other symptoms that might be associated with anxiety? No    Have you had a significant life event? No     Are you feeling depressed? No    Do you have any concerns with your use of alcohol or other drugs?  No    Social History     Tobacco Use     Smoking status: Current Every Day Smoker     Packs/day: 0.50     Years: 30.00     Pack years: 15.00     Types: Cigarettes     Last attempt to quit: 1972     Years since quittin.5     Smokeless tobacco: Never Used     Tobacco comment: have been reducing   Substance Use Topics     Alcohol use: Yes     Comment: social     Drug use: No     JAN-7 SCORE 2021   Total Score 0 (minimal anxiety)   Total Score 0     PHQ 2021   PHQ-9 Total Score 0   Q9: Thoughts of better off dead/self-harm past 2 weeks Not at all     Chronic problems general questions HPI Form  If you checked off any problems, how difficult have these problems made it for you to do your work, take care of things at home, or get along with other people?: Not difficult at all  PHQ9 TOTAL SCORE: 0  JAN 7 TOTAL SCORE: 0    1.) Patient would also like to discuss continued weight loss since late august of last year. Patient would like to discuss adding weight.    Here today to follow-up on weight loss.  This is an issue we have discussed in the past we reviewed recent lab work and interval history.  Weight loss has stabilized but not yet gained.  Still having GI symptoms.    Review of Systems   Constitutional, HEENT, cardiovascular, pulmonary, gi and gu systems are negative, except as otherwise noted.      Objective    /75   Pulse 80   Temp 97.8  F (36.6  C) (Oral)   Resp 16   Wt 50.8 kg (112 lb)   SpO2 99%   BMI 16.78 kg/m    Body mass index is 16.78 kg/m .  Physical Exam   Alert, pleasant, upbeat, and in no apparent discomfort.  Very slender but not cachectic  S1 and S2 normal, no murmurs, clicks, gallops or rubs. Regular rate and rhythm. Chest is clear; no wheezes or rales. No edema or JVD.  The abdomen is soft without tenderness, guarding, mass, rebound or organomegaly. Bowel sounds are normal. No CVA tenderness or inguinal adenopathy noted.  Past labs reviewed with the patient.

## 2021-06-23 ENCOUNTER — OFFICE VISIT (OUTPATIENT)
Dept: FAMILY MEDICINE | Facility: CLINIC | Age: 71
End: 2021-06-23
Payer: COMMERCIAL

## 2021-06-23 VITALS
TEMPERATURE: 97.8 F | BODY MASS INDEX: 16.78 KG/M2 | WEIGHT: 112 LBS | DIASTOLIC BLOOD PRESSURE: 75 MMHG | SYSTOLIC BLOOD PRESSURE: 116 MMHG | HEART RATE: 80 BPM | OXYGEN SATURATION: 99 % | RESPIRATION RATE: 16 BRPM

## 2021-06-23 DIAGNOSIS — Z72.0 TOBACCO ABUSE: ICD-10-CM

## 2021-06-23 DIAGNOSIS — K21.9 GASTROESOPHAGEAL REFLUX DISEASE WITHOUT ESOPHAGITIS: ICD-10-CM

## 2021-06-23 DIAGNOSIS — R63.4 WEIGHT LOSS: Primary | ICD-10-CM

## 2021-06-23 PROCEDURE — 99214 OFFICE O/P EST MOD 30 MIN: CPT | Performed by: FAMILY MEDICINE

## 2021-06-23 ASSESSMENT — ANXIETY QUESTIONNAIRES
4. TROUBLE RELAXING: NOT AT ALL
1. FEELING NERVOUS, ANXIOUS, OR ON EDGE: NOT AT ALL
2. NOT BEING ABLE TO STOP OR CONTROL WORRYING: NOT AT ALL
3. WORRYING TOO MUCH ABOUT DIFFERENT THINGS: NOT AT ALL
GAD7 TOTAL SCORE: 0
5. BEING SO RESTLESS THAT IT IS HARD TO SIT STILL: NOT AT ALL
6. BECOMING EASILY ANNOYED OR IRRITABLE: NOT AT ALL
GAD7 TOTAL SCORE: 0
7. FEELING AFRAID AS IF SOMETHING AWFUL MIGHT HAPPEN: NOT AT ALL
GAD7 TOTAL SCORE: 0
7. FEELING AFRAID AS IF SOMETHING AWFUL MIGHT HAPPEN: NOT AT ALL

## 2021-06-23 ASSESSMENT — PATIENT HEALTH QUESTIONNAIRE - PHQ9
SUM OF ALL RESPONSES TO PHQ QUESTIONS 1-9: 0
SUM OF ALL RESPONSES TO PHQ QUESTIONS 1-9: 0
10. IF YOU CHECKED OFF ANY PROBLEMS, HOW DIFFICULT HAVE THESE PROBLEMS MADE IT FOR YOU TO DO YOUR WORK, TAKE CARE OF THINGS AT HOME, OR GET ALONG WITH OTHER PEOPLE: NOT DIFFICULT AT ALL

## 2021-06-23 NOTE — PROGRESS NOTES
Answers for HPI/ROS submitted by the patient on 6/23/2021   Chronic problems general questions HPI Form  If you checked off any problems, how difficult have these problems made it for you to do your work, take care of things at home, or get along with other people?: Not difficult at all  PHQ9 TOTAL SCORE: 0  JAN 7 TOTAL SCORE: 0  How many servings of fruits and vegetables do you eat daily?: 0-1  On average, how many sweetened beverages do you drink each day (Examples: soda, juice, sweet tea, etc.  Do NOT count diet or artificially sweetened beverages)?: 1  How many minutes a day do you exercise enough to make your heart beat faster?: 9 or less  How many days a week do you exercise enough to make your heart beat faster?: 4  How many days per week do you miss taking your medication?: 0

## 2021-06-24 ASSESSMENT — ANXIETY QUESTIONNAIRES: GAD7 TOTAL SCORE: 0

## 2021-06-24 ASSESSMENT — PATIENT HEALTH QUESTIONNAIRE - PHQ9: SUM OF ALL RESPONSES TO PHQ QUESTIONS 1-9: 0

## 2021-07-06 ENCOUNTER — TELEPHONE (OUTPATIENT)
Dept: FAMILY MEDICINE | Facility: CLINIC | Age: 71
End: 2021-07-06

## 2021-07-06 NOTE — TELEPHONE ENCOUNTER
Evidently I have to call to do some type of [id number] approval process.  Probably will not be able to do it until Thursday.  Patient does not have to do anything specifically at this point until we know more.  And certainly not a visit with me

## 2021-07-06 NOTE — TELEPHONE ENCOUNTER
"----- Message from BRIAN Villanueva CNP sent at 7/5/2021  7:02 AM CDT -----  Regarding: RE: Peer to peer request  Can we call insurance and get a face to face scheduled with Dr Heart as this is best  done with who evaluated and ordered the Test   Thanks   ----- Message -----  From: Nahed Perez  Sent: 7/5/2021   6:52 AM CDT  To: Karley Heart MD, #  Subject: Peer to peer request                             Good morning-    This is a peer to peer request for CT Chest Abdomen Pelvis w/o & w Contrast exam. The insurance is requesting this to better understand the reason why the test is being ordered. This peer to peer needs to be completed on or before 7/6/2021.     Please call the insurance company and reference the following information:  Payor phone number: [phone]- [phone]  Case number: [id number]  Patient ID: [id number] ([employer] blue shield [employer])    Reason why it was denied:unable to approve [id number] with the information provided \"Your records do not show that results of a thorough workup failed to reveal the cause of  your weight loss. This workup should involve blood tests that include hormone levels, a  scope (a test that uses a tube with a light and camera attached to it in order to see live  pictures of your gut) and/or barium swallow test (a test that involves drinking a substance  that shows up on x-ray and then taking images as the substance makes its way to your  stomach in order to check those areas), tests that look for blood in your stool, and a detailed exam of your nervous system (a network of nerve cells and fibers that send nerve messages between parts of your body).\" CT Chest has been approved but [employer] is unable to provide an approval # unless a peer to peer is done for CT Abdomen and Pelvis.     Requesting response of outcome back to FC's on approval/denial with the following information:   auth#  date range  who they spoke to     If you have any further questions " please feel free to reach out to me. Thank you for your attention on this.     Nahed Perez  Senior Intake Financial Counselor  [facility]  [address] [address]  Ph: [phone]  Fax: [phone]

## 2021-07-06 NOTE — TELEPHONE ENCOUNTER
Nahed Perez, Karley Doshi MD; [facility]; P [employer]             Following up to see if anyone was able to review this request? The case will be denied at the end of the day today if no outreach is made to [employer].     Thank you,     Nahed Perez   Senior Intake Financial Counselor   [facility]   [address] [place] [address]   Ph: [phone]   Fax: [phone]

## 2021-07-07 NOTE — TELEPHONE ENCOUNTER
Nahed, returned call, responding to left message.    Call was from .  Caller is transferred to care team.  Renea Gama RN

## 2021-07-08 ENCOUNTER — TELEPHONE (OUTPATIENT)
Dept: FAMILY MEDICINE | Facility: CLINIC | Age: 71
End: 2021-07-08

## 2021-07-08 NOTE — TELEPHONE ENCOUNTER
"----- Message from Karley Heart MD sent at 7/7/2021  7:30 AM CDT -----  Regarding: FW: Peer to peer request  All of these messages came in when I was out of the office.  I am willing to do a peer to peer review of this case on my administration day on Thursday (tomorrow).  Could we contact them to set this up sometime tomorrow?  I know from experience that the initial call is simply to set up a time for the review.  Can use my cell phone number ([phone])  EDEN Heart M.D.   ----- Message -----  From: Nahed Perez  Sent: 7/6/2021   2:49 PM CDT  To: Karley Heart MD, #  Subject: FW: Peer to peer request                         Following up to see if anyone was able to review this request? The case will be denied at the end of the day today if no outreach is made to [facility]).    Thank you,    Nahed Perez  Senior [facility] Financial Counselor  [address] [address]  Ph: [phone]  Fax: [phone]      ----- Message -----  From: Nahed Perez  Sent: 7/5/2021   6:52 AM CDT  To: Karley Heart MD, #  Subject: Peer to peer request                             Good morning-    This is a peer to peer request for CT Chest Abdomen Pelvis w/o & w Contrast exam. The insurance is requesting this to better understand the reason why the test is being ordered. This peer to peer needs to be completed on or before 7/6/2021.     Please call the insurance company and reference the following information:  Payor phone number: 1- [phone]  Case number: [id number]  Patient ID: [id number] ([employer] blue shield [employer])    Reason why it was denied:unable to approve [id number] with the information provided \"Your records do not show that results of a thorough workup failed to reveal the cause of  your weight loss. This workup should involve blood tests that include hormone levels, a  scope (a test that uses a tube with a light and camera attached to it in order to " "see live  pictures of your gut) and/or barium swallow test (a test that involves drinking a substance  that shows up on x-ray and then taking images as the substance makes its way to your  stomach in order to check those areas), tests that look for blood in your stool, and a detailed exam of your nervous system (a network of nerve cells and fibers that send nerve messages between parts of your body).\" CT Chest has been approved but [facility] is unable to provide an approval # unless a peer to peer is done for CT Abdomen and Pelvis.     Requesting response of outcome back to FC's on approval/denial with the following information:   auth#  date range  who they spoke to     If you have any further questions please feel free to reach out to me. Thank you for your attention on this.     Nahed Perez  Senior Intake Financial Counselor  [facility]  [address] [place] [address]  Ph: [phone]  Fax: [phone]                      "

## 2021-07-08 NOTE — LETTER
July 19, 2021      Carlos Soares  [address] [facility] [address]        Dear Carlos,     We have been attempting to reach you by phone.  Please schedule a virtual visit or in person appointment as soon as possible.  It is ok for you to use a same day slot.       Sincerely,        Karley Heart MD

## 2021-07-09 NOTE — TELEPHONE ENCOUNTER
OK - Sounds like they approved a CT chest only.  That is at least a starting point - so patient can go ahead and schedule that (at [place]).  But they recommend a different type of work up for possible GI related pathology.  Not sure how far patient wants to push that.  So I suggest setting him up with me to discuss - wither in person or virtual (per patient) - OK for a same day.

## 2021-07-09 NOTE — TELEPHONE ENCOUNTER
This writer attempted to contact pt on 07/09/21      Reason for call schedule pt /relay  Message  And call answered but pt could not hear me, x 2.      If patient calls back:    set him up with Dr. Heart to discuss issue below - weither in person or virtual (per patient) - OK for a same day        Natalee Garcia

## 2021-07-16 NOTE — TELEPHONE ENCOUNTER
This writer attempted to contact pt on 07/16/21        Reason for call schedule pt /relay  Message  And left message to call back.     If patient calls back:               set him up with Dr. Heart to discuss issue below - weither in person or virtual (per patient) - OK for a same day           Natalee Garcia

## 2021-07-19 ENCOUNTER — MYC MEDICAL ADVICE (OUTPATIENT)
Dept: GENERAL RADIOLOGY | Facility: CLINIC | Age: 71
End: 2021-07-19

## 2021-09-26 ENCOUNTER — HEALTH MAINTENANCE LETTER (OUTPATIENT)
Age: 71
End: 2021-09-26

## 2021-10-07 ENCOUNTER — IMMUNIZATION (OUTPATIENT)
Dept: FAMILY MEDICINE | Facility: CLINIC | Age: 71
End: 2021-10-07
Payer: COMMERCIAL

## 2021-10-07 DIAGNOSIS — Z23 NEED FOR PROPHYLACTIC VACCINATION AND INOCULATION AGAINST INFLUENZA: Primary | ICD-10-CM

## 2021-10-07 PROCEDURE — 90662 IIV NO PRSV INCREASED AG IM: CPT

## 2021-10-07 PROCEDURE — G0008 ADMIN INFLUENZA VIRUS VAC: HCPCS

## 2021-10-07 NOTE — PROGRESS NOTES
Prior to immunization administration, verified patients identity using patient s name and date of birth. Please see Immunization Activity for additional information.     Screening Questionnaire for Adult Immunization    Are you sick today?   No   Do you have allergies to medications, food, a vaccine component or latex?   No   Have you ever had a serious reaction after receiving a vaccination?   No   Do you have a long-term health problem with heart, lung, kidney, or metabolic disease (e.g., diabetes), asthma, a blood disorder, no spleen, complement component deficiency, a cochlear implant, or a spinal fluid leak?  Are you on long-term aspirin therapy?   No   Do you have cancer, leukemia, HIV/AIDS, or any other immune system problem?   No   Do you have a parent, brother, or sister with an immune system problem?   No   In the past 3 months, have you taken medications that affect  your immune system, such as prednisone, other steroids, or anticancer drugs; drugs for the treatment of rheumatoid arthritis, Crohn s disease, or psoriasis; or have you had radiation treatments?   No   Have you had a seizure, or a brain or other nervous system problem?   No   During the past year, have you received a transfusion of blood or blood    products, or been given immune (gamma) globulin or antiviral drug?   No   For women: Are you pregnant or is there a chance you could become       pregnant during the next month?   No   Have you received any vaccinations in the past 4 weeks?   No     Immunization questionnaire answers were all negative.        Per orders of Dr. Heart, injection of Fluzone high dose given by Barbara Moyer. Patient instructed to remain in clinic for 15 minutes afterwards, and to report any adverse reaction to me immediately.       Screening performed by Barbara Moyer on 10/7/2021 at 10:07 AM.

## 2021-10-21 ENCOUNTER — IMMUNIZATION (OUTPATIENT)
Dept: NURSING | Facility: CLINIC | Age: 71
End: 2021-10-21
Payer: COMMERCIAL

## 2021-10-21 PROCEDURE — 0004A PR COVID VAC PFIZER DIL RECON 30 MCG/0.3 ML IM: CPT

## 2021-10-21 PROCEDURE — 91300 PR COVID VAC PFIZER DIL RECON 30 MCG/0.3 ML IM: CPT

## 2021-11-09 DIAGNOSIS — F51.01 PRIMARY INSOMNIA: ICD-10-CM

## 2021-11-09 RX ORDER — LORAZEPAM 1 MG/1
TABLET ORAL
Qty: 30 TABLET | Refills: 0 | Status: SHIPPED | OUTPATIENT
Start: 2021-11-09 | End: 2022-01-11

## 2021-11-09 NOTE — TELEPHONE ENCOUNTER
PDMP Review       Value Time User    State PDMP site checked  Yes 11/9/2021  2:05 PM Kelley Coppola MD        Reviewed last physical note. Will refer per plan by pcp. No red flags in pdmp

## 2021-12-15 DIAGNOSIS — F51.01 PRIMARY INSOMNIA: ICD-10-CM

## 2021-12-15 RX ORDER — TRIAZOLAM 0.25 MG
TABLET ORAL
Qty: 30 TABLET | Refills: 2 | Status: SHIPPED | OUTPATIENT
Start: 2021-12-15 | End: 2022-02-09

## 2022-01-10 DIAGNOSIS — F51.01 PRIMARY INSOMNIA: ICD-10-CM

## 2022-01-11 RX ORDER — LORAZEPAM 1 MG/1
TABLET ORAL
Qty: 30 TABLET | Refills: 0 | Status: SHIPPED | OUTPATIENT
Start: 2022-01-11 | End: 2022-02-09

## 2022-02-08 ASSESSMENT — ENCOUNTER SYMPTOMS
DIARRHEA: 0
FREQUENCY: 1
EYE PAIN: 0
NERVOUS/ANXIOUS: 1
NAUSEA: 0
DYSURIA: 0
ABDOMINAL PAIN: 0
HEMATOCHEZIA: 0
PALPITATIONS: 0
JOINT SWELLING: 0
SORE THROAT: 0
HEARTBURN: 0
HEADACHES: 0
CONSTIPATION: 0
WEAKNESS: 0
PARESTHESIAS: 0
ARTHRALGIAS: 0
FEVER: 0
MYALGIAS: 0
HEMATURIA: 0
DIZZINESS: 0
COUGH: 0
CHILLS: 0

## 2022-02-08 ASSESSMENT — ACTIVITIES OF DAILY LIVING (ADL): CURRENT_FUNCTION: NO ASSISTANCE NEEDED

## 2022-02-09 ENCOUNTER — OFFICE VISIT (OUTPATIENT)
Dept: FAMILY MEDICINE | Facility: CLINIC | Age: 72
End: 2022-02-09
Payer: COMMERCIAL

## 2022-02-09 VITALS
BODY MASS INDEX: 16.29 KG/M2 | SYSTOLIC BLOOD PRESSURE: 130 MMHG | HEART RATE: 82 BPM | HEIGHT: 69 IN | RESPIRATION RATE: 16 BRPM | TEMPERATURE: 97.5 F | OXYGEN SATURATION: 99 % | DIASTOLIC BLOOD PRESSURE: 80 MMHG | WEIGHT: 110 LBS

## 2022-02-09 DIAGNOSIS — Z13.220 SCREENING CHOLESTEROL LEVEL: ICD-10-CM

## 2022-02-09 DIAGNOSIS — K21.9 GASTROESOPHAGEAL REFLUX DISEASE WITHOUT ESOPHAGITIS: ICD-10-CM

## 2022-02-09 DIAGNOSIS — Z12.5 PROSTATE CANCER SCREENING: ICD-10-CM

## 2022-02-09 DIAGNOSIS — Z13.1 DIABETES MELLITUS SCREENING: ICD-10-CM

## 2022-02-09 DIAGNOSIS — Z00.00 ENCOUNTER FOR MEDICARE ANNUAL WELLNESS EXAM: Primary | ICD-10-CM

## 2022-02-09 DIAGNOSIS — F51.01 PRIMARY INSOMNIA: ICD-10-CM

## 2022-02-09 LAB
CHOLEST SERPL-MCNC: 174 MG/DL
FASTING STATUS PATIENT QL REPORTED: YES
FASTING STATUS PATIENT QL REPORTED: YES
GLUCOSE BLD-MCNC: 92 MG/DL (ref 70–99)
HDLC SERPL-MCNC: 82 MG/DL
LDLC SERPL CALC-MCNC: 69 MG/DL
NONHDLC SERPL-MCNC: 92 MG/DL
PSA SERPL-MCNC: 1.26 UG/L (ref 0–4)
TRIGL SERPL-MCNC: 116 MG/DL

## 2022-02-09 PROCEDURE — 80061 LIPID PANEL: CPT | Performed by: FAMILY MEDICINE

## 2022-02-09 PROCEDURE — 36415 COLL VENOUS BLD VENIPUNCTURE: CPT | Performed by: FAMILY MEDICINE

## 2022-02-09 PROCEDURE — G0103 PSA SCREENING: HCPCS | Performed by: FAMILY MEDICINE

## 2022-02-09 PROCEDURE — 82947 ASSAY GLUCOSE BLOOD QUANT: CPT | Performed by: FAMILY MEDICINE

## 2022-02-09 PROCEDURE — G0439 PPPS, SUBSEQ VISIT: HCPCS | Performed by: FAMILY MEDICINE

## 2022-02-09 RX ORDER — TRIAZOLAM 0.25 MG
TABLET ORAL
Qty: 30 TABLET | Refills: 2 | Status: SHIPPED | OUTPATIENT
Start: 2022-02-09 | End: 2022-02-09

## 2022-02-09 RX ORDER — PANTOPRAZOLE SODIUM 40 MG/1
40 TABLET, DELAYED RELEASE ORAL DAILY
Qty: 90 TABLET | Refills: 3 | Status: SHIPPED | OUTPATIENT
Start: 2022-02-09 | End: 2023-02-01

## 2022-02-09 RX ORDER — LORAZEPAM 1 MG/1
1 TABLET ORAL AT BEDTIME
Qty: 30 TABLET | Refills: 5 | Status: SHIPPED | OUTPATIENT
Start: 2022-02-09 | End: 2022-10-14

## 2022-02-09 ASSESSMENT — ENCOUNTER SYMPTOMS
ARTHRALGIAS: 0
HEMATOCHEZIA: 0
NERVOUS/ANXIOUS: 1
CONSTIPATION: 0
DIZZINESS: 0
FEVER: 0
SORE THROAT: 0
NAUSEA: 0
CHILLS: 0
WEAKNESS: 0
MYALGIAS: 0
ABDOMINAL PAIN: 0
DIARRHEA: 0
PARESTHESIAS: 0
HEMATURIA: 0
JOINT SWELLING: 0
HEARTBURN: 0
DYSURIA: 0
FREQUENCY: 1
COUGH: 0
HEADACHES: 0
EYE PAIN: 0
PALPITATIONS: 0

## 2022-02-09 ASSESSMENT — ACTIVITIES OF DAILY LIVING (ADL): CURRENT_FUNCTION: NO ASSISTANCE NEEDED

## 2022-02-09 ASSESSMENT — MIFFLIN-ST. JEOR: SCORE: 1246.4

## 2022-02-09 ASSESSMENT — PAIN SCALES - GENERAL: PAINLEVEL: NO PAIN (0)

## 2022-02-09 NOTE — PROGRESS NOTES
"SUBJECTIVE:   Carlos Soares is a 71 year old male who presents for Preventive Visit.      Patient has been advised of split billing requirements and indicates understanding: Yes  Are you in the first 12 months of your Medicare coverage?  No    Healthy Habits:     In general, how would you rate your overall health?  Good    Frequency of exercise:  2-3 days/week    Duration of exercise:  Less than 15 minutes    Do you usually eat at least 4 servings of fruit and vegetables a day, include whole grains    & fiber and avoid regularly eating high fat or \"junk\" foods?  Yes    Taking medications regularly:  Yes    Medication side effects:  None    Ability to successfully perform activities of daily living:  No assistance needed    Home Safety:  No safety concerns identified    Hearing Impairment:  No hearing concerns    In the past 6 months, have you been bothered by leaking of urine?  No    In general, how would you rate your overall mental or emotional health?  Good      PHQ-2 Total Score: 0    Additional concerns today:  Yes    Do you feel safe in your environment? Yes    Have you ever done Advance Care Planning? (For example, a Health Directive, POLST, or a discussion with a medical provider or your loved ones about your wishes):        Fall risk      Cognitive Screening   1) Repeat 3 items (Leader, Season, Table)    2) Clock draw: NORMAL  3) 3 item recall: Recalls 3 objects  Results: 3 items recalled: COGNITIVE IMPAIRMENT LESS LIKELY    Mini-CogTM Copyright ELAINA Rae. Licensed by the author for use in [facility]; reprinted with permission (luis@.[email or web address]). All rights reserved.      Do you have sleep apnea, excessive snoring or daytime drowsiness?: no    Reviewed and updated as needed this visit by clinical staff  Tobacco  Allergies  Meds  Problems  Med Hx  Surg Hx  Fam Hx  Soc Hx         Reviewed and updated as needed this visit by Provider  Tobacco  Allergies  Meds  Problems  Med Hx  Surg Hx  " Fam Hx        Social History     Tobacco Use     Smoking status: Current Every Day Smoker     Packs/day: 0.50     Years: 30.00     Pack years: 15.00     Types: Cigarettes     Last attempt to quit: 1972     Years since quittin.1     Smokeless tobacco: Never Used     Tobacco comment: have been reducing   Substance Use Topics     Alcohol use: Yes     Comment: social     If you drink alcohol do you typically have >3 drinks per day or >7 drinks per week? No    Alcohol Use 2022   Prescreen: >3 drinks/day or >7 drinks/week? No   Prescreen: >3 drinks/day or >7 drinks/week? -   No flowsheet data found.        Current providers sharing in care for this patient include:   Patient Care Team:  Karley Heart MD as PCP - General (Family Practice)  Karley Heart MD as Assigned PCP  Michael Chang DO as Assigned Surgical Provider    The following health maintenance items are reviewed in Epic and correct as of today:  Health Maintenance Due   Topic Date Due     ZOSTER IMMUNIZATION (1 of 2) Never done     LUNG CANCER SCREENING  Never done     FALL RISK ASSESSMENT  2022     Lab work is in process  Labs reviewed in EPIC  BP Readings from Last 3 Encounters:   22 130/80   21 116/75   21 126/78    Wt Readings from Last 3 Encounters:   22 49.9 kg (110 lb)   21 50.8 kg (112 lb)   21 51.3 kg (113 lb 3.2 oz)            Here today for routine checkup.  Weight has stabilized but low weight continues to be an issue.  Does not seem to be a medical problem and is feeling well overall.  Lorazepam works much better than triazolam for sleep and also help some with anxiety.    Review of Systems   Constitutional: Negative for chills and fever.   HENT: Negative for congestion, ear pain, hearing loss and sore throat.    Eyes: Negative for pain and visual disturbance.   Respiratory: Negative for cough.    Cardiovascular: Negative for chest pain, palpitations and peripheral  "edema.   Gastrointestinal: Negative for abdominal pain, constipation, diarrhea, heartburn, hematochezia and nausea.   Genitourinary: Positive for frequency. Negative for dysuria, genital sores, hematuria, impotence, penile discharge and urgency.   Musculoskeletal: Negative for arthralgias, joint swelling and myalgias.   Skin: Negative for rash.   Neurological: Negative for dizziness, weakness, headaches and paresthesias.   Psychiatric/Behavioral: Negative for mood changes. The patient is nervous/anxious.        OBJECTIVE:   /80 (BP Location: Right arm, Patient Position: Sitting, Cuff Size: Adult Regular)   Pulse 82   Temp 97.5  F (36.4  C)   Resp 16   Ht 1.74 m (5' 8.5\")   Wt 49.9 kg (110 lb)   SpO2 99%   BMI 16.48 kg/m   Estimated body mass index is 16.48 kg/m  as calculated from the following:    Height as of this encounter: 1.74 m (5' 8.5\").    Weight as of this encounter: 49.9 kg (110 lb).  Physical Exam  GENERAL: healthy, alert and no distress  EYES: Eyes grossly normal to inspection, PERRL and conjunctivae and sclerae normal  HENT: ear canals and TM's normal, nose and mouth without ulcers or lesions  NECK: no adenopathy, no asymmetry, masses, or scars and thyroid normal to palpation  RESP: lungs clear to auscultation - no rales, rhonchi or wheezes  CV: regular rate and rhythm, normal S1 S2, no S3 or S4, no murmur, click or rub, no peripheral edema and peripheral pulses strong  ABDOMEN: soft, nontender, no hepatosplenomegaly, no masses and bowel sounds normal  MS: no gross musculoskeletal defects noted, no edema  SKIN: no suspicious lesions or rashes  NEURO: Normal strength and tone, mentation intact and speech normal  PSYCH: mentation appears normal, affect normal/bright    Diagnostic Test Results:  Labs reviewed in Epic    ASSESSMENT / PLAN:   (Z00.00) Encounter for Medicare annual wellness exam  (primary encounter diagnosis)  Comment: Routine health maintenance otherwise up-to-date.  Working on " "getting shingles vaccine through the pharmacy.  Plan:     (K21.9) Gastroesophageal reflux disease without esophagitis  Comment: Stable on current regimen.  Continue same plan and routine follow-up.   Plan: pantoprazole (PROTONIX) 40 MG EC tablet            (F51.01) Primary insomnia  Comment: Discontinue triazolam and continue with lorazepam.  Doesn't necessarily use every night.  Plan: LORazepam (ATIVAN) 1 MG tablet, DISCONTINUED:         triazolam (HALCION) 0.25 MG tablet            (Z13.220) Screening cholesterol level  Comment:   Plan: Lipid panel reflex to direct LDL Fasting            (Z13.1) Diabetes mellitus screening  Comment:   Plan: Glucose            (Z12.5) Prostate cancer screening  Comment:   Plan: Prostate Specific Antigen Screen              Patient has been advised of split billing requirements and indicates understanding: Yes    COUNSELING:  Reviewed preventive health counseling, as reflected in patient instructions       Regular exercise       Healthy diet/nutrition       Vision screening       Hearing screening       Dental care       Colon cancer screening       Prostate cancer screening    Estimated body mass index is 16.48 kg/m  as calculated from the following:    Height as of this encounter: 1.74 m (5' 8.5\").    Weight as of this encounter: 49.9 kg (110 lb).    Weight management plan noted, stable and monitoring    He reports that he has been smoking cigarettes. He has a 15.00 pack-year smoking history. He has never used smokeless tobacco.  Tobacco Cessation Action Plan:   Information offered: Patient not interested at this time      Appropriate preventive services were discussed with this patient, including applicable screening as appropriate for cardiovascular disease, diabetes, osteopenia/osteoporosis, and glaucoma.  As appropriate for age/gender, discussed screening for colorectal cancer, prostate cancer, breast cancer, and cervical cancer. Checklist reviewing preventive services " available has been given to the patient.    Reviewed patients plan of care and provided an AVS. The Basic Care Plan (routine screening as documented in Health Maintenance) for Carlos meets the Care Plan requirement. This Care Plan has been established and reviewed with the Patient.    Counseling Resources:  ATP IV Guidelines  Pooled Cohorts Equation Calculator  Breast Cancer Risk Calculator  Breast Cancer: Medication to Reduce Risk  FRAX Risk Assessment  ICSI Preventive Guidelines  Dietary Guidelines for Americans, 2010  [employer]'s MyPlate  ASA Prophylaxis  Lung CA Screening    Karley Heart MD  [facility]    Identified Health Risks:

## 2022-02-09 NOTE — PATIENT INSTRUCTIONS
Patient Education   Personalized Prevention Plan  You are due for the preventive services outlined below.  Your care team is available to assist you in scheduling these services.  If you have already completed any of these items, please share that information with your care team to update in your medical record.  Health Maintenance Due   Topic Date Due     ANNUAL REVIEW OF HM ORDERS  Never done     Zoster (Shingles) Vaccine (1 of 2) Never done     LUNG CANCER SCREENING  Never done     FALL RISK ASSESSMENT  02/02/2022     Annual Wellness Visit  02/02/2022

## 2022-09-26 NOTE — TELEPHONE ENCOUNTER
Called [phone] for Case ID: [id number],     They will send over a fax requesting information to prior auth team. Could not change the fax number to [facility] specifically so it will go directly to prior auth team.    Called  [phone], transferred me to [facility] appeals, then transferred to [facility] to discuss peer to peer review. Number is [phone]. Peer to peer was not available. Full work up that failed to reveal cause of weight loss, includes blood test with hormone levels, a scope and/or barium swallow test, any tests that look for blood in the stool, and an exam of the nervous system.   60 calendar days to submit appeal, Fax: [phone] ATTN: appeal Case Number: [id number] with pt's name and . Have 60 days to get this done. Peer to peer couldn't be done because it would not change the case status.     Denial case number: [id number]   Patient is in the lateral left side position.   Procedure performed on a bed/cart.

## 2022-10-06 ENCOUNTER — IMMUNIZATION (OUTPATIENT)
Dept: NURSING | Facility: CLINIC | Age: 72
End: 2022-10-06
Payer: COMMERCIAL

## 2022-10-06 PROCEDURE — G0008 ADMIN INFLUENZA VIRUS VAC: HCPCS | Mod: 59

## 2022-10-06 PROCEDURE — 90662 IIV NO PRSV INCREASED AG IM: CPT

## 2022-10-06 PROCEDURE — 91312 COVID-19,PF,PFIZER BOOSTER BIVALENT: CPT

## 2022-10-06 PROCEDURE — 0124A COVID-19,PF,PFIZER BOOSTER BIVALENT: CPT

## 2022-10-14 DIAGNOSIS — F51.01 PRIMARY INSOMNIA: ICD-10-CM

## 2022-10-14 RX ORDER — LORAZEPAM 1 MG/1
TABLET ORAL
Qty: 30 TABLET | Refills: 2 | Status: SHIPPED | OUTPATIENT
Start: 2022-10-14 | End: 2023-02-01

## 2023-02-01 ENCOUNTER — OFFICE VISIT (OUTPATIENT)
Dept: FAMILY MEDICINE | Facility: CLINIC | Age: 73
End: 2023-02-01
Payer: COMMERCIAL

## 2023-02-01 VITALS
RESPIRATION RATE: 20 BRPM | WEIGHT: 110 LBS | TEMPERATURE: 97.4 F | SYSTOLIC BLOOD PRESSURE: 110 MMHG | DIASTOLIC BLOOD PRESSURE: 70 MMHG | HEART RATE: 78 BPM | HEIGHT: 68 IN | OXYGEN SATURATION: 100 % | BODY MASS INDEX: 16.67 KG/M2

## 2023-02-01 DIAGNOSIS — Z13.220 SCREENING CHOLESTEROL LEVEL: ICD-10-CM

## 2023-02-01 DIAGNOSIS — Z87.891 PERSONAL HISTORY OF TOBACCO USE: ICD-10-CM

## 2023-02-01 DIAGNOSIS — K21.9 GASTROESOPHAGEAL REFLUX DISEASE WITHOUT ESOPHAGITIS: ICD-10-CM

## 2023-02-01 DIAGNOSIS — Z00.00 ENCOUNTER FOR MEDICARE ANNUAL WELLNESS EXAM: Primary | ICD-10-CM

## 2023-02-01 DIAGNOSIS — Z12.5 PROSTATE CANCER SCREENING: ICD-10-CM

## 2023-02-01 DIAGNOSIS — F51.01 PRIMARY INSOMNIA: ICD-10-CM

## 2023-02-01 DIAGNOSIS — Z13.1 DIABETES MELLITUS SCREENING: ICD-10-CM

## 2023-02-01 LAB
CHOLEST SERPL-MCNC: 182 MG/DL
ERYTHROCYTE [DISTWIDTH] IN BLOOD BY AUTOMATED COUNT: 13.3 % (ref 10–15)
FASTING STATUS PATIENT QL REPORTED: YES
FASTING STATUS PATIENT QL REPORTED: YES
GLUCOSE BLD-MCNC: 93 MG/DL (ref 70–99)
HCT VFR BLD AUTO: 46.1 % (ref 40–53)
HDLC SERPL-MCNC: 87 MG/DL
HGB BLD-MCNC: 14.4 G/DL (ref 13.3–17.7)
LDLC SERPL CALC-MCNC: 76 MG/DL
MCH RBC QN AUTO: 32.8 PG (ref 26.5–33)
MCHC RBC AUTO-ENTMCNC: 31.2 G/DL (ref 31.5–36.5)
MCV RBC AUTO: 105 FL (ref 78–100)
NONHDLC SERPL-MCNC: 95 MG/DL
PLATELET # BLD AUTO: 215 10E3/UL (ref 150–450)
PSA SERPL-MCNC: 1.18 UG/L (ref 0–4)
RBC # BLD AUTO: 4.39 10E6/UL (ref 4.4–5.9)
TRIGL SERPL-MCNC: 95 MG/DL
WBC # BLD AUTO: 5.8 10E3/UL (ref 4–11)

## 2023-02-01 PROCEDURE — 80061 LIPID PANEL: CPT | Performed by: FAMILY MEDICINE

## 2023-02-01 PROCEDURE — 85027 COMPLETE CBC AUTOMATED: CPT | Performed by: FAMILY MEDICINE

## 2023-02-01 PROCEDURE — G0103 PSA SCREENING: HCPCS | Performed by: FAMILY MEDICINE

## 2023-02-01 PROCEDURE — G0296 VISIT TO DETERM LDCT ELIG: HCPCS | Performed by: FAMILY MEDICINE

## 2023-02-01 PROCEDURE — 36415 COLL VENOUS BLD VENIPUNCTURE: CPT | Performed by: FAMILY MEDICINE

## 2023-02-01 PROCEDURE — G0439 PPPS, SUBSEQ VISIT: HCPCS | Performed by: FAMILY MEDICINE

## 2023-02-01 PROCEDURE — 82947 ASSAY GLUCOSE BLOOD QUANT: CPT | Performed by: FAMILY MEDICINE

## 2023-02-01 RX ORDER — LORAZEPAM 1 MG/1
1 TABLET ORAL AT BEDTIME
Qty: 30 TABLET | Refills: 5 | Status: SHIPPED | OUTPATIENT
Start: 2023-02-01 | End: 2023-02-01

## 2023-02-01 RX ORDER — PANTOPRAZOLE SODIUM 40 MG/1
40 TABLET, DELAYED RELEASE ORAL DAILY
Qty: 90 TABLET | Refills: 3 | Status: SHIPPED | OUTPATIENT
Start: 2023-02-01 | End: 2024-02-07

## 2023-02-01 RX ORDER — LORAZEPAM 1 MG/1
1 TABLET ORAL AT BEDTIME
Qty: 90 TABLET | Refills: 1 | Status: SHIPPED | OUTPATIENT
Start: 2023-02-01 | End: 2023-08-09

## 2023-02-01 ASSESSMENT — ENCOUNTER SYMPTOMS
JOINT SWELLING: 0
HEMATURIA: 0
NAUSEA: 0
DIZZINESS: 0
NERVOUS/ANXIOUS: 0
MYALGIAS: 0
ARTHRALGIAS: 0
SHORTNESS OF BREATH: 0
WEAKNESS: 0
FREQUENCY: 0
PALPITATIONS: 0
HEARTBURN: 0
ABDOMINAL PAIN: 0
COUGH: 0
HEMATOCHEZIA: 0
HEADACHES: 0
DYSURIA: 0
DIARRHEA: 0
FEVER: 0
EYE PAIN: 0
SORE THROAT: 0
CONSTIPATION: 0
CHILLS: 0
PARESTHESIAS: 0

## 2023-02-01 ASSESSMENT — PAIN SCALES - GENERAL: PAINLEVEL: NO PAIN (0)

## 2023-02-01 ASSESSMENT — ACTIVITIES OF DAILY LIVING (ADL): CURRENT_FUNCTION: NO ASSISTANCE NEEDED

## 2023-02-01 NOTE — PATIENT INSTRUCTIONS
Patient Education   Personalized Prevention Plan  You are due for the preventive services outlined below.  Your care team is available to assist you in scheduling these services.  If you have already completed any of these items, please share that information with your care team to update in your medical record.  Health Maintenance Due   Topic Date Due     Zoster (Shingles) Vaccine (1 of 2) Never done     LUNG CANCER SCREENING  Never done     ANNUAL REVIEW OF HM ORDERS  02/09/2023        Lung Cancer Screening   Frequently Asked Questions  If you are at high-risk for lung cancer, getting screened with low-dose computed tomography (LDCT) every year can help save your life. This handout offers answers to some of the most common questions about lung cancer screening. If you have other questions, please call [phone] ([phone]).     What is it?  Lung cancer screening uses special X-ray technology to create an image of your lung tissue. The exam is quick and easy and takes less than 10 seconds. We don t give you any medicine or use any needles. You can eat before and after the exam. You don t need to change your clothes as long as the clothing on your chest doesn t contain metal. But, you do need to be able to hold your breath for at least 6 seconds during the exam.    What is the goal of lung cancer screening?  The goal of lung cancer screening is to save lives. Many times, lung cancer is not found until a person starts having physical symptoms. Lung cancer screening can help detect lung cancer in the earliest stages when it may be easier to treat.    Who should be screened for lung cancer?  We suggest lung cancer screening for anyone who is at high-risk for lung cancer. You are in the high-risk group if you:      are between the ages of 55 and 79, and    have smoked at least 1 pack of cigarettes a day for 20 or more years, and    still smoke or have quit within the past 15 years.    However, if you have  a new cough or shortness of breath, you should talk to your doctor before being screened.    Why does it matter if I have symptoms?  Certain symptoms can be a sign that you have a condition in your lungs that should be checked and treated by your doctor. These symptoms include fever, chest pain, a new or changing cough, shortness of breath that you have never felt before, coughing up blood or unexplained weight loss. Having any of these symptoms can greatly affect the results of lung cancer screening.       Should all smokers get an LDCT lung cancer screening exam?  It depends. Lung cancer screening is for a very specific group of men and women who have a history of heavy smoking over a long period of time (see  Who should be screened for lung cancer  above).  I am in the high-risk group, but have been diagnosed with cancer in the past. Is LDCT lung cancer screening right for me?  In some cases, you should not have LDCT lung screening, such as when your doctor is already following your cancer with CT scan studies. Your doctor will help you decide if LDCT lung screening is right for you.  Do I need to have a screening exam every year?  Yes. If you are in the high-risk group described earlier, you should get an LDCT lung cancer screening exam every year until you are 79, or are no longer willing or able to undergo screening and possible procedures to diagnose and treat lung cancer.  How effective is LDCT at preventing death from lung cancer?  Studies have shown that LDCT lung cancer screening can lower the risk of death from lung cancer by 20 percent in people who are at high-risk.  What are the risks?  There are some risks and limitations of LDCT lung cancer screening. We want to make sure you understand the risks and benefits, so please let us know if you have any questions. Your doctor may want to talk with you more about these risks.    Radiation exposure: As with any exam that uses radiation, there is a very  small increased risk of cancer. The amount of radiation in LDCT is small--about the same amount a person would get from a mammogram. Your doctor orders the exam when he or she feels the potential benefits outweigh the risks.    False negatives: No test is perfect, including LDCT. It is possible that you may have a medical condition, including lung cancer, that is not found during your exam. This is called a false negative result.    False positives and more testing: LDCT very often finds something in the lung that could be cancer, but in fact is not. This is called a false positive result. False positive tests often cause anxiety. To make sure these findings are not cancer, you may need to have more tests. These tests will be done only if you give us permission. Sometimes patients need a treatment that can have side effects, such as a biopsy. For more information on false positives, see  What can I expect from the results?     Findings not related to lung cancer: Your LDCT exam also takes pictures of areas of your body next to your lungs. In a very small number of cases, the CT scan will show an abnormal finding in one of these areas, such as your kidneys, adrenal glands, liver or thyroid. This finding may not be serious, but you may need more tests. Your doctor can help you decide what other tests you may need, if any.  What can I expect from the results?  About 1 out of 4 LDCT exams will find something that may need more tests. Most of the time, these findings are lung nodules. Lung nodules are very small collections of tissue in the lung. These nodules are very common, and the vast majority--more than 97 percent--are not cancer (benign). Most are normal lymph nodes or small areas of scarring from past infections.  But, if a small lung nodule is found to be cancer, the cancer can be cured more than 90 percent of the time. To know if the nodule is cancer, we may need to get more images before your next yearly  screening exam. If the nodule has suspicious features (for example, it is large, has an odd shape or grows over time), we will refer you to a specialist for further testing.  Will my doctor also get the results?  Yes. Your doctor will get a copy of your results.  Is it okay to keep smoking now that there s a cancer screening exam?  No. Tobacco is one of the strongest cancer-causing agents. It causes not only lung cancer, but other cancers and cardiovascular (heart) diseases as well. The damage caused by smoking builds over time. This means that the longer you smoke, the higher your risk of disease. While it is never too late to quit, the sooner you quit, the better.  Where can I find help to quit smoking?  The best way to prevent lung cancer is to stop smoking. If you have already quit smoking, congratulations and keep it up! For help on quitting smoking, please call [facility] at 9-755-QUITNOW ([phone]) or the American Cancer Society at 1-349.736.5193 to find local resources near you.  One-on-one health coaching:  If you d prefer to work individually with a health care provider on tobacco cessation, we offer:      Medication Therapy Management:  Our specially trained pharmacists work closely with you and your doctor to help you quit smoking.  Call [phone] or [phone] (toll free).

## 2023-02-01 NOTE — PROGRESS NOTES
"  Lung Cancer Screening Shared Decision Making Visit     Carlos Soares, a 72 year old male, is eligible for lung cancer screening    History   Smoking Status     Every Day     Packs/day: 0.50     Years: 50.00     Types: Cigarettes     Last attempt to quit: 1/1/1972   Smokeless Tobacco     Never       I have discussed with patient the risks and benefits of screening for lung cancer with low-dose CT.     The risks include:    radiation exposure: one low dose chest CT has as much ionizing radiation as about 15 chest x-rays, or 6 months of background radiation living in [place]      false positives: most findings/nodules are NOT cancer, but some might still require additional diagnostic evaluation, including biopsy    over-diagnosis: some slow growing cancers that might never have been clinically significant will be detected and treated unnecessarily     The benefit of early detection of lung cancer is contingent upon adherence to annual screening or more frequent follow up if indicated.     Furthermore, to benefit from screening, Carlos must be willing and able to undergo diagnostic procedures, if indicated. Although no specific guide is available for determining severity of comorbidities, it is reasonable to withhold screening in patients who have greater mortality risk from other diseases.     We did discuss that the best way to prevent lung cancer is to not smoke.    Some patients may value a numeric estimation of lung cancer risk when evaluating if lung cancer screening is right for them, here is one calculator:    ShouldIScreen  Answers for HPI/ROS submitted by the patient on 2/1/2023  In general, how would you rate your overall physical health?: good  Frequency of exercise:: 2-3 days/week  Do you usually eat at least 4 servings of fruit and vegetables a day, include whole grains & fiber, and avoid regularly eating high fat or \"junk\" foods? : Yes  Taking medications regularly:: Yes  Medication side effects:: " None  Activities of Daily Living: no assistance needed  Home safety: no safety concerns identified  Hearing Impairment:: no hearing concerns  In the past 6 months, have you been bothered by leaking of urine?: No  abdominal pain: No  Blood in stool: No  Blood in urine: No  chest pain: No  chills: No  congestion: No  constipation: No  cough: No  diarrhea: No  dizziness: No  ear pain: No  eye pain: No  nervous/anxious: No  fever: No  frequency: No  genital sores: No  headaches: No  hearing loss: No  heartburn: No  arthralgias: No  joint swelling: No  peripheral edema: No  mood changes: No  myalgias: No  nausea: No  dysuria: No  palpitations: No  Skin sensation changes: No  sore throat: No  urgency: No  rash: No  shortness of breath: No  visual disturbance: No  weakness: No  impotence: No  penile discharge: No  In general, how would you rate your overall mental or emotional health?: good  Additional concerns today:: No  Duration of exercise:: Less than 15 minutes

## 2023-02-01 NOTE — RESULT ENCOUNTER NOTE
Your lab work looks normal.  The only thing slightly off in your blood counts is an elevated MCV, which sometimes means people can be a little low on B12 or thiamine.  So I might suggest trying a B complex vitamin once a day or so.  He might even find that it helps boost your energy a little bit.  But overall things are fine.  Keep up the good work.  EDEN Heart M.D.

## 2023-02-01 NOTE — PROGRESS NOTES
"SUBJECTIVE:   CC: Carlos is an 72 year old who presents for preventative health visit.     Patient has been advised of split billing requirements and indicates understanding: Yes  Healthy Habits:     In general, how would you rate your overall health?  Good    Frequency of exercise:  2-3 days/week    Duration of exercise:  Less than 15 minutes    Do you usually eat at least 4 servings of fruit and vegetables a day, include whole grains    & fiber and avoid regularly eating high fat or \"junk\" foods?  Yes    Taking medications regularly:  Yes    Medication side effects:  None    Ability to successfully perform activities of daily living:  No assistance needed    Home Safety:  No safety concerns identified    Hearing Impairment:  No hearing concerns    In the past 6 months, have you been bothered by leaking of urine?  No    In general, how would you rate your overall mental or emotional health?  Good      PHQ-2 Total Score: 0    Additional concerns today:  No        Today's PHQ-2 Score:   PHQ-2 (  [employer]) 2023   Q1: Little interest or pleasure in doing things 0   Q2: Feeling down, depressed or hopeless 0   PHQ-2 Score 0   PHQ-2 Total Score (12-17 Years)- Positive if 3 or more points; Administer PHQ-A if positive -   Q1: Little interest or pleasure in doing things Not at all   Q2: Feeling down, depressed or hopeless Not at all   PHQ-2 Score 0           Social History     Tobacco Use     Smoking status: Every Day     Packs/day: 0.50     Years: 50.00     Pack years: 25.00     Types: Cigarettes     Last attempt to quit: 1972     Years since quittin.1     Smokeless tobacco: Never     Tobacco comments:     have been reducing   Substance Use Topics     Alcohol use: Yes     Comment: cocktail before dinner only     If you drink alcohol do you typically have >3 drinks per day or >7 drinks per week? No    Alcohol Use 2023   Prescreen: >3 drinks/day or >7 drinks/week? No   Prescreen: >3 drinks/day or >7 " drinks/week? -   No flowsheet data found.    Last PSA:   PSA   Date Value Ref Range Status   02/02/2021 1.22 0 - 4 ug/L Final     Comment:     Assay Method:  Chemiluminescence using Siemens Vista analyzer     Prostate Specific Antigen Screen   Date Value Ref Range Status   02/09/2022 1.26 0.00 - 4.00 ug/L Final       Reviewed orders with patient. Reviewed health maintenance and updated orders accordingly - Yes  Lab work is in process  Labs reviewed in EPIC  BP Readings from Last 3 Encounters:   02/01/23 110/70   02/09/22 130/80   06/23/21 116/75    Wt Readings from Last 3 Encounters:   02/01/23 49.9 kg (110 lb)   02/09/22 49.9 kg (110 lb)   06/23/21 50.8 kg (112 lb)                    Reviewed and updated as needed this visit by clinical staff   Tobacco  Allergies  Meds  Problems  Med Hx  Surg Hx  Fam Hx          Reviewed and updated as needed this visit by Provider   Tobacco  Allergies  Meds  Problems  Med Hx  Surg Hx  Fam Hx         Here for routine wellness exam and follow-up on stable chronic issues.  Weight remains about the same and we still do not have a cause of the weight loss.  But he physically feels well overall.    Review of Systems   Constitutional: Negative for chills and fever.   HENT: Negative for congestion, ear pain, hearing loss and sore throat.    Eyes: Negative for pain and visual disturbance.   Respiratory: Negative for cough and shortness of breath.    Cardiovascular: Negative for chest pain, palpitations and peripheral edema.   Gastrointestinal: Negative for abdominal pain, constipation, diarrhea, heartburn, hematochezia and nausea.   Genitourinary: Negative for dysuria, frequency, genital sores, hematuria, impotence, penile discharge and urgency.   Musculoskeletal: Negative for arthralgias, joint swelling and myalgias.   Skin: Negative for rash.   Neurological: Negative for dizziness, weakness, headaches and paresthesias.   Psychiatric/Behavioral: Negative for mood changes.  "The patient is not nervous/anxious.        OBJECTIVE:   /70   Pulse 78   Temp 97.4  F (36.3  C) (Tympanic)   Resp 20   Ht 1.727 m (5' 8\")   Wt 49.9 kg (110 lb)   SpO2 100%   BMI 16.73 kg/m      Physical Exam  GENERAL: healthy, alert and no distress  EYES: Eyes grossly normal to inspection, PERRL and conjunctivae and sclerae normal  HENT: ear canals and TM's normal, nose and mouth without ulcers or lesions  NECK: no adenopathy, no asymmetry, masses, or scars and thyroid normal to palpation  RESP: lungs clear to auscultation - no rales, rhonchi or wheezes  CV: regular rate and rhythm, normal S1 S2, no S3 or S4, no murmur, click or rub, no peripheral edema and peripheral pulses strong  ABDOMEN: soft, nontender, no hepatosplenomegaly, no masses and bowel sounds normal  MS: no gross musculoskeletal defects noted, no edema  SKIN: no suspicious lesions or rashes  NEURO: Normal strength and tone, mentation intact and speech normal  PSYCH: mentation appears normal, affect normal/bright    Diagnostic Test Results:  Labs reviewed in Epic    ASSESSMENT/PLAN:   (Z00.00) Encounter for Medicare annual wellness exam  (primary encounter diagnosis)  Comment: Routine health maintenance otherwise up-to-date.  Plan:     (F51.01) Primary insomnia  Comment: Stable on current regimen.  Continue same plan and routine follow-up.   Plan: LORazepam (ATIVAN) 1 MG tablet, DISCONTINUED:         LORazepam (ATIVAN) 1 MG tablet            (K21.9) Gastroesophageal reflux disease without esophagitis  Comment: Stable on current regimen.  Continue same plan and routine follow-up.   Plan: pantoprazole (PROTONIX) 40 MG EC tablet, CBC         with platelets            (Z87.891) Personal history of tobacco use  Comment: Discussed lung cancer screening and patient is interested.  Discussed tobacco cessation.  Not yet at that point.  Plan: Prof fee: Shared Decision Making for Lung         Cancer Screening, CT Chest Lung Cancer Scrn Low        " Dose wo, SMOKING CESSATION COUNSELING 3-10 MIN            (Z13.220) Screening cholesterol level  Comment:   Plan: Lipid panel reflex to direct LDL Fasting            (Z13.1) Diabetes mellitus screening  Comment:   Plan: Glucose            (Z12.5) Prostate cancer screening  Comment:   Plan: Prostate Specific Antigen Screen              Patient has been advised of split billing requirements and indicates understanding: Yes      COUNSELING:   Reviewed preventive health counseling, as reflected in patient instructions       Regular exercise       Healthy diet/nutrition       Vision screening       Colorectal cancer screening       Prostate cancer screening        He reports that he has been smoking cigarettes. He has a 25.00 pack-year smoking history. He has never used smokeless tobacco.  Nicotine/Tobacco Cessation Plan:   Information offered: Patient not interested at this time            Karley Heart MD  [facility]

## 2023-05-19 ENCOUNTER — IMMUNIZATION (OUTPATIENT)
Dept: NURSING | Facility: CLINIC | Age: 73
End: 2023-05-19
Payer: COMMERCIAL

## 2023-05-19 PROCEDURE — 91312 COVID-19 BIVALENT 12+ (PFIZER): CPT

## 2023-05-19 PROCEDURE — 0124A COVID-19 BIVALENT 12+ (PFIZER): CPT

## 2023-08-09 DIAGNOSIS — F51.01 PRIMARY INSOMNIA: ICD-10-CM

## 2023-08-09 RX ORDER — LORAZEPAM 1 MG/1
1 TABLET ORAL AT BEDTIME
Qty: 90 TABLET | Refills: 0 | Status: SHIPPED | OUTPATIENT
Start: 2023-08-09 | End: 2023-11-03

## 2023-09-20 ENCOUNTER — OFFICE VISIT (OUTPATIENT)
Dept: OPTOMETRY | Facility: CLINIC | Age: 73
End: 2023-09-20
Payer: COMMERCIAL

## 2023-09-20 DIAGNOSIS — H52.223 REGULAR ASTIGMATISM OF BOTH EYES: Primary | ICD-10-CM

## 2023-09-20 DIAGNOSIS — H52.4 PRESBYOPIA: ICD-10-CM

## 2023-09-20 DIAGNOSIS — H18.509 CORNEAL DYSTROPHY: ICD-10-CM

## 2023-09-20 PROCEDURE — 92004 COMPRE OPH EXAM NEW PT 1/>: CPT

## 2023-09-20 PROCEDURE — 92015 DETERMINE REFRACTIVE STATE: CPT

## 2023-09-20 ASSESSMENT — KERATOMETRY
OS_AXISANGLE_DEGREES: 153
OS_AXISANGLE2_DEGREES: 63
OD_AXISANGLE_DEGREES: 2
OD_AXISANGLE2_DEGREES: 92
OD_K1POWER_DIOPTERS: 43.75
OS_K1POWER_DIOPTERS: 43.50
OS_K2POWER_DIOPTERS: 43.75
OD_K2POWER_DIOPTERS: 44.25

## 2023-09-20 ASSESSMENT — CONF VISUAL FIELD
OS_INFERIOR_TEMPORAL_RESTRICTION: 0
OD_INFERIOR_NASAL_RESTRICTION: 0
OS_SUPERIOR_TEMPORAL_RESTRICTION: 0
OS_NORMAL: 1
OD_NORMAL: 1
OS_SUPERIOR_NASAL_RESTRICTION: 0
OS_INFERIOR_NASAL_RESTRICTION: 0
OD_INFERIOR_TEMPORAL_RESTRICTION: 0
OD_SUPERIOR_NASAL_RESTRICTION: 0
OD_SUPERIOR_TEMPORAL_RESTRICTION: 0

## 2023-09-20 ASSESSMENT — VISUAL ACUITY
OS_CC: 20/25
OD_SC: 20/20
OD_CC+: -1
CORRECTION_TYPE: GLASSES
METHOD: SNELLEN - LINEAR
OS_SC: 20/25
OS_CC: 20/20
OS_SC+: -2
OD_CC: 20/20
OD_CC: 20/25

## 2023-09-20 ASSESSMENT — REFRACTION_MANIFEST
OD_CYLINDER: +1.25
METHOD_AUTOREFRACTION: 1
OD_AXIS: 174
OD_AXIS: 173
OD_CYLINDER: +1.25
OS_SPHERE: -0.25
OD_ADD: +2.50
OS_AXIS: 180
OS_ADD: +2.50
OS_AXIS: 001
OS_CYLINDER: +1.00
OD_SPHERE: +0.00
OD_SPHERE: -0.25
OS_SPHERE: +0.00
OS_CYLINDER: +0.75

## 2023-09-20 ASSESSMENT — CUP TO DISC RATIO
OD_RATIO: 0.3
OS_RATIO: 0.3

## 2023-09-20 ASSESSMENT — TONOMETRY
OD_IOP_MMHG: 15
IOP_METHOD: TONOPEN
OS_IOP_MMHG: 17

## 2023-09-20 ASSESSMENT — REFRACTION_WEARINGRX
OD_SPHERE: PLANO
OD_ADD: +2.25
OS_CYLINDER: +1.00
OS_ADD: +2.25
SPECS_TYPE: BIFOCAL
OS_AXIS: 180
OD_CYLINDER: SPHERE
OS_SPHERE: -0.50

## 2023-09-20 ASSESSMENT — SLIT LAMP EXAM - LIDS
COMMENTS: TR MGD
COMMENTS: TR MGD

## 2023-09-20 ASSESSMENT — EXTERNAL EXAM - RIGHT EYE: OD_EXAM: NORMAL

## 2023-09-20 ASSESSMENT — EXTERNAL EXAM - LEFT EYE: OS_EXAM: NORMAL

## 2023-09-20 NOTE — PROGRESS NOTES
Chief Complaint   Patient presents with    Annual Eye Exam         Last Eye Exam: 2022  Dilated Previously: Yes, side effects of dilation explained today    What are you currently using to see?  Glasses and +1.00 reading glasses       Distance Vision Acuity: Satisfied with vision    Near Vision Acuity: Satisfied with vision while reading and using computer with readers and with glasses    Eye Comfort: good  Do you use eye drops? : Yes: clear eyes   Occupation or Hobbies: retired- shooting, guitars, traveling     Laura Owens - Optometric Assistant          Medical, surgical and family histories reviewed and updated 9/20/2023.       OBJECTIVE: See Ophthalmology exam    ASSESSMENT:    ICD-10-CM    1. Regular astigmatism of both eyes  H52.223       2. Presbyopia  H52.4       3. Corneal dystrophy  H18.509           PLAN:     Patient Instructions   Regular astigmatism with presbyopia each eye: Updated bifocal glasses prescription for full-time wear.  Monitor annually.  Corneal dystrophy: q/o EBMD left eye, irregular epithelial appearance.  Patient asymptomatic.  Advised patient to seek care in the event of eye redness, pain, or tearing.  Monitor annually.     Miya Farah, OD

## 2023-09-20 NOTE — PATIENT INSTRUCTIONS
Regular astigmatism with presbyopia each eye: Updated bifocal glasses prescription for full-time wear.  Monitor annually.  Corneal dystrophy: q/o EBMD left eye, irregular epithelial appearance.  Patient asymptomatic.  Advised patient to seek care in the event of eye redness, pain, or tearing.  Monitor annually.

## 2023-09-20 NOTE — LETTER
9/20/2023         RE: Carlos Soares  [address] [id number]        Dear Colleague,    Thank you for referring your patient, Carlos Soares, to the [facility] [place] [facility]. Please see a copy of my visit note below.    Chief Complaint   Patient presents with     Annual Eye Exam         Last Eye Exam: 2022  Dilated Previously: Yes, side effects of dilation explained today    What are you currently using to see?  Glasses and +1.00 reading glasses       Distance Vision Acuity: Satisfied with vision    Near Vision Acuity: Satisfied with vision while reading and using computer with readers and with glasses    Eye Comfort: good  Do you use eye drops? : Yes: clear eyes   Occupation or Hobbies: retired- shooting, guitars, traveling     Denchase Owens - Optometric Assistant          Medical, surgical and family histories reviewed and updated 9/20/2023.       OBJECTIVE: See Ophthalmology exam    ASSESSMENT:    ICD-10-CM    1. Regular astigmatism of both eyes  H52.223       2. Presbyopia  H52.4       3. Corneal dystrophy  H18.509           PLAN:     Patient Instructions   Regular astigmatism with presbyopia each eye: Updated bifocal glasses prescription for full-time wear.  Monitor annually.  Corneal dystrophy: q/o EBMD left eye, irregular epithelial appearance.  Patient asymptomatic.  Advised patient to seek care in the event of eye redness, pain, or tearing.  Monitor annually.     Miya Farah, LEYDI      Again, thank you for allowing me to participate in the care of your patient.        Sincerely,        Miya Farah OD

## 2023-10-03 ENCOUNTER — IMMUNIZATION (OUTPATIENT)
Dept: NURSING | Facility: CLINIC | Age: 73
End: 2023-10-03
Payer: COMMERCIAL

## 2023-10-03 PROCEDURE — 91320 SARSCV2 VAC 30MCG TRS-SUC IM: CPT

## 2023-10-03 PROCEDURE — 90662 IIV NO PRSV INCREASED AG IM: CPT

## 2023-10-03 PROCEDURE — G0008 ADMIN INFLUENZA VIRUS VAC: HCPCS

## 2023-10-03 PROCEDURE — 90480 ADMN SARSCOV2 VAC 1/ONLY CMP: CPT

## 2023-11-03 ENCOUNTER — MYC MEDICAL ADVICE (OUTPATIENT)
Dept: FAMILY MEDICINE | Facility: CLINIC | Age: 73
End: 2023-11-03
Payer: COMMERCIAL

## 2023-11-03 DIAGNOSIS — F51.01 PRIMARY INSOMNIA: ICD-10-CM

## 2023-11-03 RX ORDER — LORAZEPAM 1 MG/1
1 TABLET ORAL AT BEDTIME
Qty: 90 TABLET | Refills: 0 | Status: SHIPPED | OUTPATIENT
Start: 2023-11-03 | End: 2023-11-03

## 2023-11-03 RX ORDER — LORAZEPAM 1 MG/1
1 TABLET ORAL AT BEDTIME
Qty: 90 TABLET | Refills: 0 | Status: SHIPPED | OUTPATIENT
Start: 2023-11-03 | End: 2024-02-07

## 2023-11-03 NOTE — TELEPHONE ENCOUNTER
Routing to provider to resend prescription to different pharmacy due to out stock at [facility] in [place].    JACY Gaspar  [facility]

## 2023-11-03 NOTE — TELEPHONE ENCOUNTER
See [email or web address] message. Noted 90 day supply with no additional refill was sent 8/9/23.     Please review and advise patient can get refill and if visit needed?     JACY Gaspar  [facility]

## 2024-01-31 SDOH — HEALTH STABILITY: PHYSICAL HEALTH: ON AVERAGE, HOW MANY MINUTES DO YOU ENGAGE IN EXERCISE AT THIS LEVEL?: 20 MIN

## 2024-01-31 SDOH — HEALTH STABILITY: PHYSICAL HEALTH: ON AVERAGE, HOW MANY DAYS PER WEEK DO YOU ENGAGE IN MODERATE TO STRENUOUS EXERCISE (LIKE A BRISK WALK)?: 3 DAYS

## 2024-01-31 ASSESSMENT — SOCIAL DETERMINANTS OF HEALTH (SDOH): HOW OFTEN DO YOU GET TOGETHER WITH FRIENDS OR RELATIVES?: ONCE A WEEK

## 2024-02-07 ENCOUNTER — OFFICE VISIT (OUTPATIENT)
Dept: FAMILY MEDICINE | Facility: CLINIC | Age: 74
End: 2024-02-07
Payer: COMMERCIAL

## 2024-02-07 VITALS
WEIGHT: 109 LBS | DIASTOLIC BLOOD PRESSURE: 78 MMHG | TEMPERATURE: 97.5 F | HEART RATE: 77 BPM | OXYGEN SATURATION: 100 % | RESPIRATION RATE: 12 BRPM | BODY MASS INDEX: 16.52 KG/M2 | SYSTOLIC BLOOD PRESSURE: 129 MMHG | HEIGHT: 68 IN

## 2024-02-07 DIAGNOSIS — Z13.220 SCREENING CHOLESTEROL LEVEL: ICD-10-CM

## 2024-02-07 DIAGNOSIS — Z87.891 PERSONAL HISTORY OF TOBACCO USE: ICD-10-CM

## 2024-02-07 DIAGNOSIS — F51.01 PRIMARY INSOMNIA: ICD-10-CM

## 2024-02-07 DIAGNOSIS — R35.0 BENIGN PROSTATIC HYPERPLASIA WITH URINARY FREQUENCY: ICD-10-CM

## 2024-02-07 DIAGNOSIS — Z12.5 PROSTATE CANCER SCREENING: ICD-10-CM

## 2024-02-07 DIAGNOSIS — N40.1 BENIGN PROSTATIC HYPERPLASIA WITH URINARY FREQUENCY: ICD-10-CM

## 2024-02-07 DIAGNOSIS — Z13.1 DIABETES MELLITUS SCREENING: ICD-10-CM

## 2024-02-07 DIAGNOSIS — Z00.00 ENCOUNTER FOR MEDICARE ANNUAL WELLNESS EXAM: Primary | ICD-10-CM

## 2024-02-07 DIAGNOSIS — K21.9 GASTROESOPHAGEAL REFLUX DISEASE WITHOUT ESOPHAGITIS: ICD-10-CM

## 2024-02-07 PROCEDURE — 36415 COLL VENOUS BLD VENIPUNCTURE: CPT | Performed by: FAMILY MEDICINE

## 2024-02-07 PROCEDURE — G0296 VISIT TO DETERM LDCT ELIG: HCPCS | Performed by: FAMILY MEDICINE

## 2024-02-07 PROCEDURE — 80061 LIPID PANEL: CPT | Performed by: FAMILY MEDICINE

## 2024-02-07 PROCEDURE — G0103 PSA SCREENING: HCPCS | Performed by: FAMILY MEDICINE

## 2024-02-07 PROCEDURE — 82947 ASSAY GLUCOSE BLOOD QUANT: CPT | Performed by: FAMILY MEDICINE

## 2024-02-07 PROCEDURE — G0439 PPPS, SUBSEQ VISIT: HCPCS | Performed by: FAMILY MEDICINE

## 2024-02-07 RX ORDER — LORAZEPAM 1 MG/1
1 TABLET ORAL AT BEDTIME
Qty: 90 TABLET | Refills: 1 | Status: SHIPPED | OUTPATIENT
Start: 2024-02-07 | End: 2024-08-06

## 2024-02-07 RX ORDER — PANTOPRAZOLE SODIUM 40 MG/1
40 TABLET, DELAYED RELEASE ORAL DAILY
Qty: 90 TABLET | Refills: 3 | Status: SHIPPED | OUTPATIENT
Start: 2024-02-07

## 2024-02-07 RX ORDER — RESPIRATORY SYNCYTIAL VIRUS VACCINE 120MCG/0.5
0.5 KIT INTRAMUSCULAR ONCE
Qty: 1 EACH | Refills: 0 | Status: CANCELLED | OUTPATIENT
Start: 2024-02-07 | End: 2024-02-07

## 2024-02-07 ASSESSMENT — PAIN SCALES - GENERAL: PAINLEVEL: NO PAIN (0)

## 2024-02-07 NOTE — PROGRESS NOTES
Lung Cancer Screening Shared Decision Making Visit     Carlos Soares, a 73 year old male, is eligible for lung cancer screening    History   Smoking Status    Every Day    Packs/day: 0.50    Years: 50.00    Types: Cigarettes    Last attempt to quit: 1/1/1972   Smokeless Tobacco    Never       I have discussed with patient the risks and benefits of screening for lung cancer with low-dose CT.     The risks include:    radiation exposure: one low dose chest CT has as much ionizing radiation as about 15 chest x-rays, or 6 months of background radiation living in [place]      false positives: most findings/nodules are NOT cancer, but some might still require additional diagnostic evaluation, including biopsy    over-diagnosis: some slow growing cancers that might never have been clinically significant will be detected and treated unnecessarily     The benefit of early detection of lung cancer is contingent upon adherence to annual screening or more frequent follow up if indicated.     Furthermore, to benefit from screening, Carlos must be willing and able to undergo diagnostic procedures, if indicated. Although no specific guide is available for determining severity of comorbidities, it is reasonable to withhold screening in patients who have greater mortality risk from other diseases.     We did discuss that the best way to prevent lung cancer is to not smoke.    Some patients may value a numeric estimation of lung cancer risk when evaluating if lung cancer screening is right for them, here is one calculator:    ShouldIScreen

## 2024-02-07 NOTE — PATIENT INSTRUCTIONS
"Learning About Being Physically Active  What is physical activity?     Being physically active means doing any kind of activity that gets your body moving.  The types of physical activity that can help you get fit and stay healthy include:  Aerobic or \"cardio\" activities. These make your heart beat faster and make you breathe harder, such as brisk walking, riding a bike, or running. They strengthen your heart and lungs and build up your endurance.  Strength training activities. These make your muscles work against, or \"resist,\" something. Examples include lifting weights or doing push-ups. These activities help tone and strengthen your muscles and bones.  Stretches. These let you move your joints and muscles through their full range of motion. Stretching helps you be more flexible.  Reaching a balance between these three types of physical activity is important because each one contributes to your overall fitness.  What are the benefits of being active?  Being active is one of the best things you can do for your health. It helps you to:  Feel stronger and have more energy to do all the things you like to do.  Focus better at school or work.  Feel, think, and sleep better.  Reach and stay at a healthy weight.  Lose fat and build lean muscle.  Lower your risk for serious health problems, including diabetes, heart attack, high blood pressure, and some cancers.  Keep your heart, lungs, bones, muscles, and joints strong and healthy.  How can you make being active part of your life?  Start slowly. Make it your long-term goal to get at least 30 minutes of exercise on most days of the week. Walking is a good choice. You also may want to do other activities, such as running, swimming, cycling, or playing tennis or team sports.  Pick activities that you like--ones that make your heart beat faster, your muscles stronger, and your muscles and joints more flexible. If you find more than one thing you like doing, do them all. You " "don't have to do the same thing every day.  Get your heart pumping every day. Any activity that makes your heart beat faster and keeps it at that rate for a while counts.  Here are some great ways to get your heart beating faster:  Go for a brisk walk, run, or hike.  Go for a swim or bike ride.  Take an online exercise class or dance.  Play a game of touch football, basketball, or soccer.  Play tennis, pickleball, or racquetball.  Climb stairs.  Even some household chores can be aerobic. Just do them at a faster pace. Raking or mowing the lawn, sweeping the garage, and vacuuming and cleaning your home all can help get your heart rate up.  Strengthen your muscles during the week. You don't have to lift heavy weights or grow big, bulky muscles to get stronger. Doing a few simple activities that make your muscles work against, or \"resist,\" something can help you get stronger. Aim for at least twice a week.  For example, you can:  Do push-ups or sit-ups, which use your own body weight as resistance.  Lift weights or dumbbells or use stretch bands at home or in a gym or community center.  Stretch your muscles often. Stretching will help you as you become more active. It can help you stay flexible and loosen tight muscles. It can also help improve your balance and posture and can be a great way to relax.  Be sure to stretch the muscles you'll be using when you work out. It's best to warm your muscles slightly before you stretch them. Walk or do some other light aerobic activity for a few minutes. Then start stretching.  When you stretch your muscles:  Do it slowly. Stretching is not about going fast or making sudden movements.  Don't push or bounce during a stretch.  Hold each stretch for at least 15 to 30 seconds, if you can. You should feel a stretch in the muscle, but not pain.  Breathe out as you do the stretch. Then breathe in as you hold the stretch. Don't hold your breath.  If you're worried about how more activity " "might affect your health, have a checkup before you start. Follow any special advice your doctor gives you for getting a smart start.  Where can you learn more?  Go to https://www.[employer].net/patiented  Enter W332 in the search box to learn more about \"Learning About Being Physically Active.\"  Current as of: June 6, 2023               Content Version: 13.8    4979-6875 [employer]   Care instructions adapted under license by your healthcare professional. If you have questions about a medical condition or this instruction, always ask your healthcare professional. [employer] disclaims any warranty or liability for your use of this information.      Eating Healthy Foods: Care Instructions  With every meal, you can make healthy food choices. Try to eat a variety of fruits, vegetables, whole grains, lean proteins, and low-fat dairy products. This can help you get the right balance of nutrients, including vitamins and minerals. Small changes add up over time. You can start by adding one healthy food to your meals each day.    Try to make half your plate fruits and vegetables, one-fourth whole grains, and one-fourth lean proteins. Try including dairy with your meals.   Eat more fruits and vegetables. Try to have them with most meals and snacks.   Foods for healthy eating    Fruits    These can be fresh, frozen, canned, or dried.  Try to choose whole fruit rather than fruit juice.  Eat a variety of colors.    Vegetables    These can be fresh, frozen, canned, or dried.  Beans, peas, and lentils count too.    Whole grains    Choose whole-grain breads, cereals, and noodles.  Try brown rice.    Lean proteins    These can include lean meat, poultry, fish, and eggs.  You can also have tofu, beans, peas, lentils, nuts, and seeds.    Dairy    Try milk, yogurt, and cheese.  Choose low-fat or fat-free when you can.  If you need to, use lactose-free milk or fortified plant-based milk products, such as " "soy milk.    Water    Drink water when you're thirsty.  Limit sugar-sweetened drinks, including soda, fruit drinks, and sports drinks.  Where can you learn more?  Go to https://www.[employer].net/patiented  Enter T756 in the search box to learn more about \"Eating Healthy Foods: Care Instructions.\"  Current as of: February 28, 2023               Content Version: 13.8    6941-5166 [employer].   Care instructions adapted under license by your healthcare professional. If you have questions about a medical condition or this instruction, always ask your healthcare professional. [employer] disclaims any warranty or liability for your use of this information.      Nutrition for Older Adults: Care Instructions  Overview     Good nutrition is important at any age. But it is especially important for older adults. Eating a healthy diet helps keep your body strong. And it can help lower your risk for disease.  As you get older, your body needs more of certain nutrients. These include vitamin B12, calcium, and vitamin D. But it may be harder for you to get these and other important nutrients. This could be for many reasons. You may not feel as hungry as you used to. Or you could have problems with your teeth or mouth that make it hard to chew. Or you may not enjoy planning and preparing meals, especially if you live alone.  Talk with your doctor if you want help getting the most nutrition from what you eat. The doctor may have you work with a dietitian to help you plan meals.  Follow-up care is a key part of your treatment and safety. Be sure to make and go to all appointments, and call your doctor if you are having problems. It's also a good idea to know your test results and keep a list of the medicines you take.  How can you care for yourself at home?  To stay healthy  Eat a variety of foods. The more you vary the foods you eat, the more vitamins, minerals, and other nutrients you get.  Take a " multivitamin every day. Choose one with about 100% of the daily value (DV) for vitamins and minerals. Do not take more than 100% of the daily value for any vitamin or mineral unless your doctor tells you to. Talk with your doctor if you are not sure which multivitamin is right for you.  Eat lots of fruits and vegetables. Fresh, frozen, or no-salt canned vegetables and fruits in their own juice or light syrup are good choices.  Include foods that are high in vitamin B12 in your diet. Good choices are fortified breakfast cereal, nonfat or low-fat milk and other dairy products, meat, poultry, fish, and eggs.  Get enough calcium and vitamin D. Good choices include nonfat or low-fat milk, cheese, and yogurt. Other good options are tofu, orange juice with added calcium, and some leafy green vegetables, such as brianna greens and kale. If you don't use milk products, talk to your doctor about calcium and vitamin D supplements.  Eat protein foods every day. Good choices include lean meat, fish, poultry, eggs, and cheese. Other good options are cooked beans, peanut butter, and nuts and seeds.  Choose whole grains for half of the grains you eat. Look for 100% whole wheat bread, whole-grain cereals, brown rice, and other whole grains.  If you have constipation  Eat high-fiber foods every day. These include fruits, vegetables, cooked dried beans, and whole grains.  Drink plenty of fluids. If you have kidney, heart, or liver disease and have to limit fluids, talk with your doctor before you increase the amount of fluids you drink.  Ask your doctor if stool softeners may help keep your bowels regular.  If you have mouth problems that make chewing hard  Pick canned or cooked fruits and vegetables. These are often softer.  Chop or shred meat, poultry, and fish. Add sauce or gravy to the meat to help keep it moist.  Pick other protein foods that are soft. These include cheese, peanut butter, cooked beans, cottage cheese, and  "eggs.  If you have trouble shopping for yourself  Ask a local food store to deliver groceries to your home.  Contact a volunteer center and ask for help.  Ask a family member or neighbor to help you.  If you have trouble preparing meals  If you are able, take a cooking class.  Use a microwave oven to cook TV dinners and other frozen or prepared foods.  Take part in group meal programs. You can find these through senior citizen programs.  Have meals brought to your home. Your community may offer programs that deliver meals, such as Meals on Wheels.  If your appetite is poor  Try eating smaller amounts of food more often. For example, eat 4 or 5 small meals a day instead of 1 or 2 large meals.  Eat with family and friends. Or take part in group meal programs offered through volunteer programs. Eating with others may help your appetite. And it helps you be more social.  Ask your doctor if your medicines could cause appetite or taste problems. If so, ask about changing medicines.  Add spices and herbs to increase the flavor of food.  If you think you are depressed, ask your doctor for help. Depression can affect your appetite. And it can make it hard to do everyday activities like grocery shopping and making meals. Treatment can help.  When should you call for help?  Watch closely for changes in your health, and be sure to contact your doctor if you have any problems.  Where can you learn more?  Go to https://www.[employer].net/patiented  Enter L643 in the search box to learn more about \"Nutrition for Older Adults: Care Instructions.\"  Current as of: February 26, 2023               Content Version: 13.8    0315-9114 [employer].   Care instructions adapted under license by your healthcare professional. If you have questions about a medical condition or this instruction, always ask your healthcare professional. [employer] disclaims any warranty or liability for your use of this " information.      Preventive Care Advice   This is general advice given by our system to help you stay healthy. However, your care team may have specific advice just for you. Please talk to your care team about your preventive care needs.  Nutrition  Eat 5 or more servings of fruits and vegetables each day.  Try wheat bread, brown rice and whole grain pasta (instead of white bread, rice, and pasta).  Get enough calcium and vitamin D. Check the label on foods and aim for 100% of the RDA (recommended daily allowance).  Lifestyle  Exercise at least 150 minutes each week  (30 minutes a day, 5 days a week).  Do muscle strengthening activities 2 days a week. These help control your weight and prevent disease.  No smoking.  Wear sunscreen to prevent skin cancer.  Have a dental exam and cleaning every 6 months.  Yearly exams  See your health care team every year to talk about:  Any changes in your health.  Any medicines your care team has prescribed.  Preventive care, family planning, and ways to prevent chronic diseases.  Shots (vaccines)   HPV shots (up to age 26), if you've never had them before.  Hepatitis B shots (up to age 59), if you've never had them before.  COVID-19 shot: Get this shot when it's due.  Flu shot: Get a flu shot every year.  Tetanus shot: Get a tetanus shot every 10 years.  Pneumococcal, hepatitis A, and RSV shots: Ask your care team if you need these based on your risk.  Shingles shot (for age 50 and up)  General health tests  Diabetes screening:  Starting at age 35, Get screened for diabetes at least every 3 years.  If you are younger than age 35, ask your care team if you should be screened for diabetes.  Cholesterol test: At age 39, start having a cholesterol test every 5 years, or more often if advised.  Bone density scan (DEXA): At age 50, ask your care team if you should have this scan for osteoporosis (brittle bones).  Hepatitis C: Get tested at least once in your life.  STIs (sexually  transmitted infections)  Before age 24: Ask your care team if you should be screened for STIs.  After age 24: Get screened for STIs if you're at risk. You are at risk for STIs (including HIV) if:  You are sexually active with more than one person.  You don't use condoms every time.  You or a partner was diagnosed with a sexually transmitted infection.  If you are at risk for HIV, ask about PrEP medicine to prevent HIV.  Get tested for HIV at least once in your life, whether you are at risk for HIV or not.  Cancer screening tests  Cervical cancer screening: If you have a cervix, begin getting regular cervical cancer screening tests starting at age 21.  Breast cancer scan (mammogram): If you've ever had breasts, begin having regular mammograms starting at age 40. This is a scan to check for breast cancer.  Colon cancer screening: It is important to start screening for colon cancer at age 45.  Have a colonoscopy test every 10 years (or more often if you're at risk) Or, ask your provider about stool tests like a FIT test every year or Cologuard test every 3 years.  To learn more about your testing options, visit:   https://www.kajeet/932740.pdf.  For help making a decision, visit:   https://bit.ly/vl68387.  Prostate cancer screening test: If you have a prostate, ask your care team if a prostate cancer screening test (PSA) at age 55 is right for you.  Lung cancer screening: If you are a current or former smoker ages 50 to 80, ask your care team if ongoing lung cancer screenings are right for you.  For informational purposes only. Not to replace the advice of your health care provider. Copyright   2023 [employer]. All rights reserved. Clinically reviewed by the [facility] Transitions Program. [employer] 445540 - REV 01/24.    Lung Cancer Screening   Frequently Asked Questions  If you are at high-risk for lung cancer, getting screened with low-dose computed tomography (LDCT) every year can help  save your life. This handout offers answers to some of the most common questions about lung cancer screening. If you have other questions, please call [phone] ([phone]).     What is it?  Lung cancer screening uses special X-ray technology to create an image of your lung tissue. The exam is quick and easy and takes less than 10 seconds. We don t give you any medicine or use any needles. You can eat before and after the exam. You don t need to change your clothes as long as the clothing on your chest doesn t contain metal. But, you do need to be able to hold your breath for at least 6 seconds during the exam.    What is the goal of lung cancer screening?  The goal of lung cancer screening is to save lives. Many times, lung cancer is not found until a person starts having physical symptoms. Lung cancer screening can help detect lung cancer in the earliest stages when it may be easier to treat.    Who should be screened for lung cancer?  We suggest lung cancer screening for anyone who is at high-risk for lung cancer. You are in the high-risk group if you:      are between the ages of 55 and 79, and    have smoked at least 1 pack of cigarettes a day for 20 or more years, and    still smoke or have quit within the past 15 years.    However, if you have a new cough or shortness of breath, you should talk to your doctor before being screened.    Why does it matter if I have symptoms?  Certain symptoms can be a sign that you have a condition in your lungs that should be checked and treated by your doctor. These symptoms include fever, chest pain, a new or changing cough, shortness of breath that you have never felt before, coughing up blood or unexplained weight loss. Having any of these symptoms can greatly affect the results of lung cancer screening.       Should all smokers get an LDCT lung cancer screening exam?  It depends. Lung cancer screening is for a very specific group of men and women who have a  history of heavy smoking over a long period of time (see  Who should be screened for lung cancer  above).  I am in the high-risk group, but have been diagnosed with cancer in the past. Is LDCT lung cancer screening right for me?  In some cases, you should not have LDCT lung screening, such as when your doctor is already following your cancer with CT scan studies. Your doctor will help you decide if LDCT lung screening is right for you.  Do I need to have a screening exam every year?  Yes. If you are in the high-risk group described earlier, you should get an LDCT lung cancer screening exam every year until you are 79, or are no longer willing or able to undergo screening and possible procedures to diagnose and treat lung cancer.  How effective is LDCT at preventing death from lung cancer?  Studies have shown that LDCT lung cancer screening can lower the risk of death from lung cancer by 20 percent in people who are at high-risk.  What are the risks?  There are some risks and limitations of LDCT lung cancer screening. We want to make sure you understand the risks and benefits, so please let us know if you have any questions. Your doctor may want to talk with you more about these risks.    Radiation exposure: As with any exam that uses radiation, there is a very small increased risk of cancer. The amount of radiation in LDCT is small--about the same amount a person would get from a mammogram. Your doctor orders the exam when he or she feels the potential benefits outweigh the risks.    False negatives: No test is perfect, including LDCT. It is possible that you may have a medical condition, including lung cancer, that is not found during your exam. This is called a false negative result.    False positives and more testing: LDCT very often finds something in the lung that could be cancer, but in fact is not. This is called a false positive result. False positive tests often cause anxiety. To make sure these findings  are not cancer, you may need to have more tests. These tests will be done only if you give us permission. Sometimes patients need a treatment that can have side effects, such as a biopsy. For more information on false positives, see  What can I expect from the results?     Findings not related to lung cancer: Your LDCT exam also takes pictures of areas of your body next to your lungs. In a very small number of cases, the CT scan will show an abnormal finding in one of these areas, such as your kidneys, adrenal glands, liver or thyroid. This finding may not be serious, but you may need more tests. Your doctor can help you decide what other tests you may need, if any.  What can I expect from the results?  About 1 out of 4 LDCT exams will find something that may need more tests. Most of the time, these findings are lung nodules. Lung nodules are very small collections of tissue in the lung. These nodules are very common, and the vast majority--more than 97 percent--are not cancer (benign). Most are normal lymph nodes or small areas of scarring from past infections.  But, if a small lung nodule is found to be cancer, the cancer can be cured more than 90 percent of the time. To know if the nodule is cancer, we may need to get more images before your next yearly screening exam. If the nodule has suspicious features (for example, it is large, has an odd shape or grows over time), we will refer you to a specialist for further testing.  Will my doctor also get the results?  Yes. Your doctor will get a copy of your results.  Is it okay to keep smoking now that there s a cancer screening exam?  No. Tobacco is one of the strongest cancer-causing agents. It causes not only lung cancer, but other cancers and cardiovascular (heart) diseases as well. The damage caused by smoking builds over time. This means that the longer you smoke, the higher your risk of disease. While it is never too late to quit, the sooner you quit, the  better.  Where can I find help to quit smoking?  The best way to prevent lung cancer is to stop smoking. If you have already quit smoking, congratulations and keep it up! For help on quitting smoking, please call [facility] at 2-873-QUITNOW (1-199.506.1528) or the [employer] at 1-852.262.3609 to find local resources near you.  One-on-one health coaching:  If you d prefer to work individually with a health care provider on tobacco cessation, we offer:      Medication Therapy Management:  Our specially trained pharmacists work closely with you and your doctor to help you quit smoking.  Call [phone] or [phone] (toll free).

## 2024-02-07 NOTE — PROGRESS NOTES
Preventive Care Visit  [facility] SAV Crandall T[place] Heart MD, Family Medicine  Feb 7, 2024      SUBJECTIVE:   Carlos is a 73 year old, presenting for the following:  Medicare Visit        2/7/2024     9:12 AM   Additional Questions   Roomed by Erika VAN   Accompanied by self     Are you in the first 12 months of your Medicare coverage?  No    HPI    Today's PHQ-2 Score:       2/7/2024     9:02 AM   PHQ-2 ( 1999 [employer])   Q1: Little interest or pleasure in doing things 0   Q2: Feeling down, depressed or hopeless 0   PHQ-2 Score 0   Q1: Little interest or pleasure in doing things Not at all   Q2: Feeling down, depressed or hopeless Not at all   PHQ-2 Score 0           Have you ever done Advance Care Planning? (For example, a Health Directive, POLST, or a discussion with a medical provider or your loved ones about your wishes): Yes, patient states has an Advance Care Planning document and will bring a copy to the clinic.       Fall risk  Fallen 2 or more times in the past year?: No  Any fall with injury in the past year?: No    Cognitive Screening   1) Repeat 3 items (Leader, Season, Table)    2) Clock draw: NORMAL  3) 3 item recall: Recalls 3 objects  Results: NORMAL clock, 1-2 items recalled: COGNITIVE IMPAIRMENT LESS LIKELY    Mini-CogTM Copyright ELAINA Rae. Licensed by the author for use in [facility]; reprinted with permission (luis@.[facility]). All rights reserved.      Do you have sleep apnea, excessive snoring or daytime drowsiness? : no    Reviewed and updated as needed this visit by clinical staff   Tobacco  Allergies  Meds  Problems  Med Hx  Surg Hx  Fam Hx          Reviewed and updated as needed this visit by Provider   Tobacco  Allergies  Meds  Problems  Med Hx  Surg Hx  Fam Hx          Social History     Tobacco Use    Smoking status: Every Day     Packs/day: 0.50     Years: 50.00     Additional pack years: 0.00     Total pack years: 25.00     Types: Cigarettes      Last attempt to quit: 1972     Years since quittin.1    Smokeless tobacco: Never    Tobacco comments:     have been reducing   Substance Use Topics    Alcohol use: Yes     Comment: cocktail before dinner only             2024     2:44 PM   Alcohol Use   Prescreen: >3 drinks/day or >7 drinks/week? No          No data to display              Do you have a current opioid prescription? No  Do you use any other controlled substances or medications that are not prescribed by a provider? None              Current providers sharing in care for this patient include:   Patient Care Team:  Karley Heart MD as PCP - General (Family Medicine)  Karley Heart MD as Assigned PCP  Miya Farah OD (Optometry)  Miya Farah OD as Assigned Surgical Provider    The following health maintenance items are reviewed in Epic and correct as of today:  Health Maintenance   Topic Date Due    LUNG CANCER SCREENING  Never done    RSV VACCINE (Pregnancy & 60+) (1 - 1-dose 60+ series) Never done    NICOTINE/TOBACCO CESSATION COUNSELING Q 1 YR  2025    MEDICARE ANNUAL WELLNESS VISIT  2025    ANNUAL REVIEW OF HM ORDERS  2025    FALL RISK ASSESSMENT  2025    GLUCOSE  2026    DTAP/TDAP/TD IMMUNIZATION (2 - Td or Tdap) 2027    LIPID  2028    COLORECTAL CANCER SCREENING  2028    ADVANCE CARE PLANNING  2029    HEPATITIS C SCREENING  Completed    PHQ-2 (once per calendar year)  Completed    INFLUENZA VACCINE  Completed    Pneumococcal Vaccine: 65+ Years  Completed    ZOSTER IMMUNIZATION  Completed    AORTIC ANEURYSM SCREENING (SYSTEM ASSIGNED)  Completed    COVID-19 Vaccine  Completed    IPV IMMUNIZATION  Aged Out    HPV IMMUNIZATION  Aged Out    MENINGITIS IMMUNIZATION  Aged Out    RSV MONOCLONAL ANTIBODY  Aged Out     Lab work is in process  Labs reviewed in EPIC  BP Readings from Last 3 Encounters:   24 129/78   23 110/70   22 130/80     "Wt Readings from Last 3 Encounters:   02/07/24 49.4 kg (109 lb)   02/01/23 49.9 kg (110 lb)   02/09/22 49.9 kg (110 lb)                  Here today for routine wellness exam.  Generally doing well overall      Review of Systems     Review of Systems  CONSTITUTIONAL: NEGATIVE for fever, chills, change in weight  INTEGUMENTARY/SKIN: NEGATIVE for worrisome rashes, moles or lesions  EYES: NEGATIVE for vision changes or irritation  ENT/MOUTH: NEGATIVE for ear, mouth and throat problems  RESP: NEGATIVE for significant cough or SOB  BREAST: NEGATIVE for masses, tenderness or discharge  CV: NEGATIVE for chest pain, palpitations or peripheral edema  GI: NEGATIVE for nausea, abdominal pain, heartburn, or change in bowel habits  : NEGATIVE for frequency, dysuria, or hematuria  MUSCULOSKELETAL: NEGATIVE for significant arthralgias or myalgia  NEURO: NEGATIVE for weakness, dizziness or paresthesias  ENDOCRINE: NEGATIVE for temperature intolerance, skin/hair changes  HEME: NEGATIVE for bleeding problems  PSYCHIATRIC: NEGATIVE for changes in mood or affect    OBJECTIVE:   /78 (BP Location: Right arm, Patient Position: Sitting, Cuff Size: Adult Small)   Pulse 77   Temp 97.5  F (36.4  C) (Oral)   Resp 12   Ht 1.727 m (5' 8\")   Wt 49.4 kg (109 lb)   SpO2 100%   BMI 16.57 kg/m     Estimated body mass index is 16.57 kg/m  as calculated from the following:    Height as of this encounter: 1.727 m (5' 8\").    Weight as of this encounter: 49.4 kg (109 lb).  Physical Exam  GENERAL: alert and no distress  EYES: Eyes grossly normal to inspection, PERRL and conjunctivae and sclerae normal  HENT: ear canals and TM's normal, nose and mouth without ulcers or lesions  NECK: no adenopathy, no asymmetry, masses, or scars  RESP: lungs clear to auscultation - no rales, rhonchi or wheezes  CV: regular rate and rhythm, normal S1 S2, no S3 or S4, no murmur, click or rub, no peripheral edema  ABDOMEN: soft, nontender, no hepatosplenomegaly, " no masses and bowel sounds normal  MS: no gross musculoskeletal defects noted, no edema  SKIN: no suspicious lesions or rashes  NEURO: Normal strength and tone, mentation intact and speech normal  PSYCH: mentation appears normal, affect normal/bright    Diagnostic Test Results:  Labs reviewed in Epic    ASSESSMENT / PLAN:   Encounter for Medicare annual wellness exam  Routine health maintenance otherwise up-to-date    Primary insomnia    - LORazepam (ATIVAN) 1 MG tablet; Take 1 tablet (1 mg) by mouth at bedtime    Gastroesophageal reflux disease without esophagitis    - pantoprazole (PROTONIX) 40 MG EC tablet; Take 1 tablet (40 mg) by mouth daily    Benign prostatic hyperplasia with urinary frequency      Screening cholesterol level    - Lipid panel reflex to direct LDL Fasting; Future    Diabetes mellitus screening    - Glucose; Future    Prostate cancer screening    - Prostate Specific Antigen Screen; Future    Personal history of tobacco use    - Prof fee: Shared Decision Making for Lung Cancer Screening  - CT Chest Lung Cancer Scrn Low Dose wo; Future    Patient has been advised of split billing requirements and indicates understanding: Yes      Counseling  Reviewed preventive health counseling, as reflected in patient instructions       Regular exercise       Healthy diet/nutrition       Vision screening       Hearing screening       Dental care        He reports that he has been smoking cigarettes. He has a 25 pack-year smoking history. He has never used smokeless tobacco.  Nicotine/Tobacco Cessation Plan  Self help information given to patient      Appropriate preventive services were discussed with this patient, including applicable screening as appropriate for fall prevention, nutrition, physical activity, Tobacco-use cessation, weight loss and cognition.  Checklist reviewing preventive services available has been given to the patient.    Reviewed patients plan of care and provided an AVS. The Basic Care  Plan (routine screening as documented in Health Maintenance) for Carlos meets the Care Plan requirement. This Care Plan has been established and reviewed with the Patient.          Signed Electronically by: Karley Heart MD    Identified Health Risks

## 2024-02-08 LAB
CHOLEST SERPL-MCNC: 189 MG/DL
FASTING STATUS PATIENT QL REPORTED: YES
FASTING STATUS PATIENT QL REPORTED: YES
GLUCOSE SERPL-MCNC: 90 MG/DL (ref 70–99)
HDLC SERPL-MCNC: 91 MG/DL
LDLC SERPL CALC-MCNC: 81 MG/DL
NONHDLC SERPL-MCNC: 98 MG/DL
PSA SERPL DL<=0.01 NG/ML-MCNC: 1.36 NG/ML (ref 0–6.5)
TRIGL SERPL-MCNC: 85 MG/DL

## 2024-02-08 NOTE — RESULT ENCOUNTER NOTE
Dear Carlos Heart is out of the office and I am reviewing your results.    Your cholesterol looks good.   Your blood sugar was normal.   Your prostate cancer screening test was negative.   Please call or MyChart my office with any questions or concerns.   Kortney Hunter, PAC

## 2024-03-28 ENCOUNTER — ANCILLARY PROCEDURE (OUTPATIENT)
Dept: CT IMAGING | Facility: CLINIC | Age: 74
End: 2024-03-28
Attending: FAMILY MEDICINE
Payer: COMMERCIAL

## 2024-03-28 DIAGNOSIS — Z87.891 PERSONAL HISTORY OF TOBACCO USE: ICD-10-CM

## 2024-03-28 PROCEDURE — 71271 CT THORAX LUNG CANCER SCR C-: CPT | Mod: GC | Performed by: RADIOLOGY

## 2024-03-29 NOTE — RESULT ENCOUNTER NOTE
Dear Carlos Heart is out of the office and I am reviewing your results.    Your CT did not show anything concerning.  Repeat CT in one year.  Please call or MyChart my office with any questions or concerns.   Kortney Hunter, PAC

## 2024-08-06 ENCOUNTER — MYC REFILL (OUTPATIENT)
Dept: FAMILY MEDICINE | Facility: CLINIC | Age: 74
End: 2024-08-06
Payer: COMMERCIAL

## 2024-08-06 DIAGNOSIS — F51.01 PRIMARY INSOMNIA: ICD-10-CM

## 2024-08-06 RX ORDER — LORAZEPAM 1 MG/1
1 TABLET ORAL AT BEDTIME
Qty: 90 TABLET | Refills: 0 | Status: SHIPPED | OUTPATIENT
Start: 2024-08-06

## 2024-08-06 NOTE — TELEPHONE ENCOUNTER
[facility]   Last filled 5/9/24- 90 tablets  Med refilled

## 2024-10-09 ENCOUNTER — IMMUNIZATION (OUTPATIENT)
Dept: FAMILY MEDICINE | Facility: CLINIC | Age: 74
End: 2024-10-09
Payer: COMMERCIAL

## 2024-10-09 DIAGNOSIS — Z23 ENCOUNTER FOR IMMUNIZATION: Primary | ICD-10-CM

## 2024-10-09 PROCEDURE — 90480 ADMN SARSCOV2 VAC 1/ONLY CMP: CPT

## 2024-10-09 PROCEDURE — 91320 SARSCV2 VAC 30MCG TRS-SUC IM: CPT

## 2024-10-09 PROCEDURE — G0008 ADMIN INFLUENZA VIRUS VAC: HCPCS

## 2024-10-09 PROCEDURE — 90662 IIV NO PRSV INCREASED AG IM: CPT

## 2024-10-09 PROCEDURE — 99207 PR NO CHARGE NURSE ONLY: CPT

## 2024-10-09 NOTE — PROGRESS NOTES
Prior to immunization administration, verified patients identity using patient s name and date of birth. Please see Immunization Activity for additional information.     Is the patient's temperature normal (100.5 or less)? Yes     Patient MEETS CRITERIA. PROCEED with vaccine administration.      Patient instructed to remain in clinic for 15 minutes afterwards, and to report any adverse reactions.      Link to Ancillary Visit Immunization Standing Orders SmartSet     Screening performed by Yesica Holder MA on 10/9/2024 at 10:10 AM.

## 2024-11-05 ENCOUNTER — MYC MEDICAL ADVICE (OUTPATIENT)
Dept: FAMILY MEDICINE | Facility: CLINIC | Age: 74
End: 2024-11-05
Payer: COMMERCIAL

## 2024-11-05 ENCOUNTER — MYC REFILL (OUTPATIENT)
Dept: FAMILY MEDICINE | Facility: CLINIC | Age: 74
End: 2024-11-05
Payer: COMMERCIAL

## 2024-11-05 DIAGNOSIS — F51.01 PRIMARY INSOMNIA: ICD-10-CM

## 2024-11-05 RX ORDER — LORAZEPAM 1 MG/1
1 TABLET ORAL AT BEDTIME
Qty: 90 TABLET | Refills: 0 | Status: SHIPPED | OUTPATIENT
Start: 2024-11-05

## 2024-11-05 NOTE — TELEPHONE ENCOUNTER
Duplicate request.     Already sent to PCP, Dr. Heart for refill.    Dahiana Ring RN, BSN  [facility]

## 2025-01-28 DIAGNOSIS — K21.9 GASTROESOPHAGEAL REFLUX DISEASE WITHOUT ESOPHAGITIS: ICD-10-CM

## 2025-01-28 RX ORDER — PANTOPRAZOLE SODIUM 40 MG/1
40 TABLET, DELAYED RELEASE ORAL DAILY
Qty: 90 TABLET | Refills: 0 | Status: SHIPPED | OUTPATIENT
Start: 2025-01-28

## 2025-02-04 ENCOUNTER — MYC REFILL (OUTPATIENT)
Dept: FAMILY MEDICINE | Facility: CLINIC | Age: 75
End: 2025-02-04
Payer: COMMERCIAL

## 2025-02-04 DIAGNOSIS — F51.01 PRIMARY INSOMNIA: ICD-10-CM

## 2025-02-04 RX ORDER — LORAZEPAM 1 MG/1
1 TABLET ORAL AT BEDTIME
Qty: 90 TABLET | Refills: 0 | Status: SHIPPED | OUTPATIENT
Start: 2025-02-04

## 2025-02-12 SDOH — HEALTH STABILITY: PHYSICAL HEALTH: ON AVERAGE, HOW MANY MINUTES DO YOU ENGAGE IN EXERCISE AT THIS LEVEL?: 20 MIN

## 2025-02-12 SDOH — HEALTH STABILITY: PHYSICAL HEALTH: ON AVERAGE, HOW MANY DAYS PER WEEK DO YOU ENGAGE IN MODERATE TO STRENUOUS EXERCISE (LIKE A BRISK WALK)?: 3 DAYS

## 2025-02-12 ASSESSMENT — SOCIAL DETERMINANTS OF HEALTH (SDOH): HOW OFTEN DO YOU GET TOGETHER WITH FRIENDS OR RELATIVES?: ONCE A WEEK

## 2025-02-17 ENCOUNTER — OFFICE VISIT (OUTPATIENT)
Dept: FAMILY MEDICINE | Facility: CLINIC | Age: 75
End: 2025-02-17
Attending: FAMILY MEDICINE
Payer: COMMERCIAL

## 2025-02-17 VITALS
OXYGEN SATURATION: 98 % | DIASTOLIC BLOOD PRESSURE: 80 MMHG | WEIGHT: 113.4 LBS | TEMPERATURE: 97.9 F | SYSTOLIC BLOOD PRESSURE: 117 MMHG | BODY MASS INDEX: 16.8 KG/M2 | HEIGHT: 69 IN | RESPIRATION RATE: 14 BRPM | HEART RATE: 88 BPM

## 2025-02-17 DIAGNOSIS — K21.9 GASTROESOPHAGEAL REFLUX DISEASE WITHOUT ESOPHAGITIS: ICD-10-CM

## 2025-02-17 DIAGNOSIS — Z87.891 PERSONAL HISTORY OF TOBACCO USE: ICD-10-CM

## 2025-02-17 DIAGNOSIS — Z00.00 ENCOUNTER FOR MEDICARE ANNUAL WELLNESS EXAM: Primary | ICD-10-CM

## 2025-02-17 DIAGNOSIS — H61.23 IMPACTED CERUMEN OF BOTH EARS: ICD-10-CM

## 2025-02-17 DIAGNOSIS — Z13.1 DIABETES MELLITUS SCREENING: ICD-10-CM

## 2025-02-17 DIAGNOSIS — F51.01 PRIMARY INSOMNIA: ICD-10-CM

## 2025-02-17 DIAGNOSIS — Z13.220 SCREENING CHOLESTEROL LEVEL: ICD-10-CM

## 2025-02-17 DIAGNOSIS — Z12.5 PROSTATE CANCER SCREENING: ICD-10-CM

## 2025-02-17 LAB
CHOLEST SERPL-MCNC: 185 MG/DL
FASTING STATUS PATIENT QL REPORTED: YES
FASTING STATUS PATIENT QL REPORTED: YES
GLUCOSE SERPL-MCNC: 93 MG/DL (ref 70–99)
HDLC SERPL-MCNC: 79 MG/DL
LDLC SERPL CALC-MCNC: 85 MG/DL
NONHDLC SERPL-MCNC: 106 MG/DL
PSA SERPL DL<=0.01 NG/ML-MCNC: 1.53 NG/ML (ref 0–6.5)
TRIGL SERPL-MCNC: 106 MG/DL

## 2025-02-17 PROCEDURE — G0439 PPPS, SUBSEQ VISIT: HCPCS | Mod: 25 | Performed by: FAMILY MEDICINE

## 2025-02-17 PROCEDURE — G0103 PSA SCREENING: HCPCS | Performed by: FAMILY MEDICINE

## 2025-02-17 PROCEDURE — 80061 LIPID PANEL: CPT | Performed by: FAMILY MEDICINE

## 2025-02-17 PROCEDURE — 69209 REMOVE IMPACTED EAR WAX UNI: CPT | Mod: 50 | Performed by: FAMILY MEDICINE

## 2025-02-17 PROCEDURE — G0296 VISIT TO DETERM LDCT ELIG: HCPCS | Performed by: FAMILY MEDICINE

## 2025-02-17 PROCEDURE — 36415 COLL VENOUS BLD VENIPUNCTURE: CPT | Performed by: FAMILY MEDICINE

## 2025-02-17 PROCEDURE — 82947 ASSAY GLUCOSE BLOOD QUANT: CPT | Performed by: FAMILY MEDICINE

## 2025-02-17 RX ORDER — PANTOPRAZOLE SODIUM 40 MG/1
40 TABLET, DELAYED RELEASE ORAL DAILY
Qty: 90 TABLET | Refills: 2 | Status: SHIPPED | OUTPATIENT
Start: 2025-02-17

## 2025-02-17 NOTE — PATIENT INSTRUCTIONS
Patient Education   Preventive Care Advice   This is general advice given by our system to help you stay healthy. However, your care team may have specific advice just for you. Please talk to your care team about your preventive care needs.  Nutrition  Eat 5 or more servings of fruits and vegetables each day.  Try wheat bread, brown rice and whole grain pasta (instead of white bread, rice, and pasta).  Get enough calcium and vitamin D. Check the label on foods and aim for 100% of the RDA (recommended daily allowance).  Lifestyle  Exercise at least 150 minutes each week  (30 minutes a day, 5 days a week).  Do muscle strengthening activities 2 days a week. These help control your weight and prevent disease.  No smoking.  Wear sunscreen to prevent skin cancer.  Have a dental exam and cleaning every 6 months.  Yearly exams  See your health care team every year to talk about:  Any changes in your health.  Any medicines your care team has prescribed.  Preventive care, family planning, and ways to prevent chronic diseases.  Shots (vaccines)   HPV shots (up to age 26), if you've never had them before.  Hepatitis B shots (up to age 59), if you've never had them before.  COVID-19 shot: Get this shot when it's due.  Flu shot: Get a flu shot every year.  Tetanus shot: Get a tetanus shot every 10 years.  Pneumococcal, hepatitis A, and RSV shots: Ask your care team if you need these based on your risk.  Shingles shot (for age 50 and up)  General health tests  Diabetes screening:  Starting at age 35, Get screened for diabetes at least every 3 years.  If you are younger than age 35, ask your care team if you should be screened for diabetes.  Cholesterol test: At age 39, start having a cholesterol test every 5 years, or more often if advised.  Bone density scan (DEXA): At age 50, ask your care team if you should have this scan for osteoporosis (brittle bones).  Hepatitis C: Get tested at least once in your life.  STIs (sexually  transmitted infections)  Before age 24: Ask your care team if you should be screened for STIs.  After age 24: Get screened for STIs if you're at risk. You are at risk for STIs (including HIV) if:  You are sexually active with more than one person.  You don't use condoms every time.  You or a partner was diagnosed with a sexually transmitted infection.  If you are at risk for HIV, ask about PrEP medicine to prevent HIV.  Get tested for HIV at least once in your life, whether you are at risk for HIV or not.  Cancer screening tests  Cervical cancer screening: If you have a cervix, begin getting regular cervical cancer screening tests starting at age 21.  Breast cancer scan (mammogram): If you've ever had breasts, begin having regular mammograms starting at age 40. This is a scan to check for breast cancer.  Colon cancer screening: It is important to start screening for colon cancer at age 45.  Have a colonoscopy test every 10 years (or more often if you're at risk) Or, ask your provider about stool tests like a FIT test every year or Cologuard test every 3 years.  To learn more about your testing options, visit:   .  For help making a decision, visit:   https://bit.ly/hn82238.  Prostate cancer screening test: If you have a prostate, ask your care team if a prostate cancer screening test (PSA) at age 55 is right for you.  Lung cancer screening: If you are a current or former smoker ages 50 to 80, ask your care team if ongoing lung cancer screenings are right for you.  For informational purposes only. Not to replace the advice of your health care provider. Copyright   2023 [facility]. All rights reserved. Clinically reviewed by the [facility]. [employer] 811066 - REV 01/24.  Learning About Stress  What is stress?     Stress is your body's response to a hard situation. Your body can have a physical, emotional, or mental response. Stress is a fact of life for most people, and it  affects everyone differently. What causes stress for you may not be stressful for someone else.  A lot of things can cause stress. You may feel stress when you go on a job interview, take a test, or run a race. This kind of short-term stress is normal and even useful. It can help you if you need to work hard or react quickly. For example, stress can help you finish an important job on time.  Long-term stress is caused by ongoing stressful situations or events. Examples of long-term stress include long-term health problems, ongoing problems at work, or conflicts in your family. Long-term stress can harm your health.  How does stress affect your health?  When you are stressed, your body responds as though you are in danger. It makes hormones that speed up your heart, make you breathe faster, and give you a burst of energy. This is called the fight-or-flight stress response. If the stress is over quickly, your body goes back to normal and no harm is done.  But if stress happens too often or lasts too long, it can have bad effects. Long-term stress can make you more likely to get sick, and it can make symptoms of some diseases worse. If you tense up when you are stressed, you may develop neck, shoulder, or low back pain. Stress is linked to high blood pressure and heart disease.  Stress also harms your emotional health. It can make you grady, tense, or depressed. Your relationships may suffer, and you may not do well at work or school.  What can you do to manage stress?  You can try these things to help manage stress:   Do something active. Exercise or activity can help reduce stress. Walking is a great way to get started. Even everyday activities such as housecleaning or yard work can help.  Try yoga or isabella chi. These techniques combine exercise and meditation. You may need some training at first to learn them.  Do something you enjoy. For example, listen to music or go to a movie. Practice your hobby or do volunteer  "work.  Meditate. This can help you relax, because you are not worrying about what happened before or what may happen in the future.  Do guided imagery. Imagine yourself in any setting that helps you feel calm. You can use online videos, books, or a teacher to guide you.  Do breathing exercises. For example:  From a standing position, bend forward from the waist with your knees slightly bent. Let your arms dangle close to the floor.  Breathe in slowly and deeply as you return to a standing position. Roll up slowly and lift your head last.  Hold your breath for just a few seconds in the standing position.  Breathe out slowly and bend forward from the waist.  Let your feelings out. Talk, laugh, cry, and express anger when you need to. Talking with supportive friends or family, a counselor, or a nicole leader about your feelings is a healthy way to relieve stress. Avoid discussing your feelings with people who make you feel worse.  Write. It may help to write about things that are bothering you. This helps you find out how much stress you feel and what is causing it. When you know this, you can find better ways to cope.  What can you do to prevent stress?  You might try some of these things to help prevent stress:  Manage your time. This helps you find time to do the things you want and need to do.  Get enough sleep. Your body recovers from the stresses of the day while you are sleeping.  Get support. Your family, friends, and community can make a difference in how you experience stress.  Limit your news feed. Avoid or limit time on social media or news that may make you feel stressed.  Do something active. Exercise or activity can help reduce stress. Walking is a great way to get started.  Where can you learn more?  Go to https://www.[email or web address].net/patiented  Enter N032 in the search box to learn more about \"Learning About Stress.\"  Current as of: October 24, 2023  Content Version: 14.3    2024 [employer]. "   Care instructions adapted under license by your healthcare professional. If you have questions about a medical condition or this instruction, always ask your healthcare professional. [employer] disclaims any warranty or liability for your use of this information.       Lung Cancer Screening   Frequently Asked Questions  If you are at high-risk for lung cancer, getting screened with low-dose computed tomography (LDCT) every year can help save your life. This handout offers answers to some of the most common questions about lung cancer screening. If you have other questions, please call [phone] ([phone]).     What is it?  Lung cancer screening uses special X-ray technology to create an image of your lung tissue. The exam is quick and easy and takes less than 10 seconds. We don t give you any medicine or use any needles. You can eat before and after the exam. You don t need to change your clothes as long as the clothing on your chest doesn t contain metal. But, you do need to be able to hold your breath for at least 6 seconds during the exam.    What is the goal of lung cancer screening?  The goal of lung cancer screening is to save lives. Many times, lung cancer is not found until a person starts having physical symptoms. Lung cancer screening can help detect lung cancer in the earliest stages when it may be easier to treat.    Who should be screened for lung cancer?  We suggest lung cancer screening for anyone who is at high-risk for lung cancer. You are in the high-risk group if you:      are between the ages of 55 and 79, and    have smoked at least 1 pack of cigarettes a day for 20 or more years, and    still smoke or have quit within the past 15 years.    However, if you have a new cough or shortness of breath, you should talk to your doctor before being screened.    Why does it matter if I have symptoms?  Certain symptoms can be a sign that you have a condition in your lungs that  should be checked and treated by your doctor. These symptoms include fever, chest pain, a new or changing cough, shortness of breath that you have never felt before, coughing up blood or unexplained weight loss. Having any of these symptoms can greatly affect the results of lung cancer screening.       Should all smokers get an LDCT lung cancer screening exam?  It depends. Lung cancer screening is for a very specific group of men and women who have a history of heavy smoking over a long period of time (see  Who should be screened for lung cancer  above).  I am in the high-risk group, but have been diagnosed with cancer in the past. Is LDCT lung cancer screening right for me?  In some cases, you should not have LDCT lung screening, such as when your doctor is already following your cancer with CT scan studies. Your doctor will help you decide if LDCT lung screening is right for you.  Do I need to have a screening exam every year?  Yes. If you are in the high-risk group described earlier, you should get an LDCT lung cancer screening exam every year until you are 79, or are no longer willing or able to undergo screening and possible procedures to diagnose and treat lung cancer.  How effective is LDCT at preventing death from lung cancer?  Studies have shown that LDCT lung cancer screening can lower the risk of death from lung cancer by 20 percent in people who are at high-risk.  What are the risks?  There are some risks and limitations of LDCT lung cancer screening. We want to make sure you understand the risks and benefits, so please let us know if you have any questions. Your doctor may want to talk with you more about these risks.    Radiation exposure: As with any exam that uses radiation, there is a very small increased risk of cancer. The amount of radiation in LDCT is small--about the same amount a person would get from a mammogram. Your doctor orders the exam when he or she feels the potential benefits outweigh  the risks.    False negatives: No test is perfect, including LDCT. It is possible that you may have a medical condition, including lung cancer, that is not found during your exam. This is called a false negative result.    False positives and more testing: LDCT very often finds something in the lung that could be cancer, but in fact is not. This is called a false positive result. False positive tests often cause anxiety. To make sure these findings are not cancer, you may need to have more tests. These tests will be done only if you give us permission. Sometimes patients need a treatment that can have side effects, such as a biopsy. For more information on false positives, see  What can I expect from the results?     Findings not related to lung cancer: Your LDCT exam also takes pictures of areas of your body next to your lungs. In a very small number of cases, the CT scan will show an abnormal finding in one of these areas, such as your kidneys, adrenal glands, liver or thyroid. This finding may not be serious, but you may need more tests. Your doctor can help you decide what other tests you may need, if any.  What can I expect from the results?  About 1 out of 4 LDCT exams will find something that may need more tests. Most of the time, these findings are lung nodules. Lung nodules are very small collections of tissue in the lung. These nodules are very common, and the vast majority--more than 97 percent--are not cancer (benign). Most are normal lymph nodes or small areas of scarring from past infections.  But, if a small lung nodule is found to be cancer, the cancer can be cured more than 90 percent of the time. To know if the nodule is cancer, we may need to get more images before your next yearly screening exam. If the nodule has suspicious features (for example, it is large, has an odd shape or grows over time), we will refer you to a specialist for further testing.  Will my doctor also get the results?  Yes.  Your doctor will get a copy of your results.  Is it okay to keep smoking now that there s a cancer screening exam?  No. Tobacco is one of the strongest cancer-causing agents. It causes not only lung cancer, but other cancers and cardiovascular (heart) diseases as well. The damage caused by smoking builds over time. This means that the longer you smoke, the higher your risk of disease. While it is never too late to quit, the sooner you quit, the better.  Where can I find help to quit smoking?  The best way to prevent lung cancer is to stop smoking. If you have already quit smoking, congratulations and keep it up! For help on quitting smoking, please call [employer] at 1-555-QUITNOW (1-288.548.2952) or the [employer] at 1-152.912.4173 to find local resources near you.  One-on-one health coaching:  If you d prefer to work individually with a health care provider on tobacco cessation, we offer:      Medication Therapy Management:  Our specially trained pharmacists work closely with you and your doctor to help you quit smoking.  Call [phone] or [phone] (toll free).

## 2025-02-17 NOTE — PROGRESS NOTES
Preventive Care Visit  [facility] MARIANGEL Crandall [facility] Heart MD, Family Medicine  Feb 17, 2025      Assessment & Plan     Encounter for Medicare annual wellness exam  Routine health maintenance otherwise up-to-date  Discussed recommended vaccination and cancer screenings    Primary insomnia  Stable on current regimen.  Continue same plan and routine follow-up.     Gastroesophageal reflux disease without esophagitis  Stable on current regimen.  Continue same plan and routine follow-up.   - pantoprazole (PROTONIX) 40 MG EC tablet; Take 1 tablet (40 mg) by mouth daily.    Screening cholesterol level    - Lipid panel reflex to direct LDL Fasting; Future    Diabetes mellitus screening    - Glucose; Future    Prostate cancer screening    - Prostate Specific Antigen Screen; Future    Impacted cerumen of both ears  Soft cerumen occluding both ear canals.  Lavaged out and patient can tell his hearing is better already.  - NM REMOVAL IMPACTED CERUMEN IRRIGATION/LVG UNILAT (RN/MA); Standing    Patient has been advised of split billing requirements and indicates understanding: Yes        Nicotine/Tobacco Cessation  He reports that he has been smoking cigarettes. He started smoking about 103 years ago. He has a 25 pack-year smoking history. He has never used smokeless tobacco.  Nicotine/Tobacco Cessation Plan  Self help information given to patient      Counseling  Appropriate preventive services were addressed with this patient via screening, questionnaire, or discussion as appropriate for fall prevention, nutrition, physical activity, Tobacco-use cessation, social engagement, weight loss and cognition.  Checklist reviewing preventive services available has been given to the patient.  Reviewed patient's diet, addressing concerns and/or questions.   He is at risk for lack of exercise and has been provided with information to increase physical activity for the benefit of his well-being.   He is at risk for  psychosocial distress and has been provided with information to reduce risk.       Regular exercise  See Patient Instructions    Subjective   Carlos is a 74 year old, presenting for the following:  Physical        2/17/2025    10:06 AM   Additional Questions   Roomed by Beverly GUERRERO  Here today for routine wellness exam        Health Care Directive  Patient does not have a Health Care Directive: Advance Directive received and scanned. Click on Code in the patient header to view.      2/12/2025   General Health   How would you rate your overall physical health? Good   Feel stress (tense, anxious, or unable to sleep) To some extent   (!) STRESS CONCERN      2/12/2025   Nutrition   Diet: Regular (no restrictions)         2/12/2025   Exercise   Days per week of moderate/strenous exercise 3 days   Average minutes spent exercising at this level 20 min         2/12/2025   Social Factors   Frequency of gathering with friends or relatives Once a week   Worry food won't last until get money to buy more No   Food not last or not have enough money for food? No   Do you have housing? (Housing is defined as stable permanent housing and does not include staying ouside in a car, in a tent, in an abandoned building, in an overnight shelter, or couch-surfing.) Yes   Are you worried about losing your housing? No   Lack of transportation? No   Unable to get utilities (heat,electricity)? No         2/12/2025   Fall Risk   Fallen 2 or more times in the past year? No    No   Trouble with walking or balance? No    No       Multiple values from one day are sorted in reverse-chronological order          2/12/2025   Activities of Daily Living- Home Safety   Needs help with the following daily activites None of the above   Safety concerns in the home None of the above         2/12/2025   Dental   Dentist two times every year? Yes         2/12/2025   Hearing Screening   Hearing concerns? None of the above         2/12/2025   Driving Risk  Screening   Patient/family members have concerns about driving No         2025   General Alertness/Fatigue Screening   Have you been more tired than usual lately? No         2025   Urinary Incontinence Screening   Bothered by leaking urine in past 6 months No         2024   TB Screening   Were you born outside of the [place]? No         Today's PHQ-2 Score:       2025    10:00 AM   PHQ-2 (  [employer])   Q1: Little interest or pleasure in doing things 0    Q2: Feeling down, depressed or hopeless 0    PHQ-2 Score 0    Q1: Little interest or pleasure in doing things Not at all   Q2: Feeling down, depressed or hopeless Not at all   PHQ-2 Score 0       Proxy-reported           2025   Substance Use   If I could quit smoking, I would Somewhat disagree   I want to quit somking, worry about health affects Neutral   Willing to make a plan to quit smoking Neutral   Willing to cut down before quitting Somewhat disagree   Alcohol more than 3/day or more than 7/wk No   Do you have a current opioid prescription? No   How severe/bad is pain from 1 to 10? 2/10   Do you use any other substances recreationally? No     Social History     Tobacco Use    Smoking status: Every Day     Current packs/day: 0.00     Average packs/day: 0.5 packs/day for 50.0 years (25.0 ttl pk-yrs)     Types: Cigarettes     Start date: 1922     Last attempt to quit: 1972     Years since quittin.1    Smokeless tobacco: Never    Tobacco comments:     have been reducing   Vaping Use    Vaping status: Never Used   Substance Use Topics    Alcohol use: Yes     Comment: cocktail before dinner only    Drug use: No           2025   AAA Screening   Family history of Abdominal Aortic Aneurysm (AAA)? No   ASCVD Risk   The 10-year ASCVD risk score (Mora LANE, et al., 2019) is: 16%    Values used to calculate the score:      Age: 74 years      Sex: Male      Is Non- : No      Diabetic: No      Tobacco  smoker: Yes      Systolic Blood Pressure: 117 mmHg      Is BP treated: No      HDL Cholesterol: 91 mg/dL      Total Cholesterol: 189 mg/dL            Reviewed and updated as needed this visit by Provider   Tobacco  Allergies  Meds  Problems  Med Hx  Surg Hx  Fam Hx            Lab work is in process  Labs reviewed in EPIC  BP Readings from Last 3 Encounters:   02/17/25 117/80   02/07/24 129/78   02/01/23 110/70    Wt Readings from Last 3 Encounters:   02/17/25 51.4 kg (113 lb 6.4 oz)   02/07/24 49.4 kg (109 lb)   02/01/23 49.9 kg (110 lb)                  Current providers sharing in care for this patient include:  Patient Care Team:  Karley Heart MD as PCP - General (Family Medicine)  Karley Heart MD as Assigned PCP  Miya Farah OD (Optometry)  Miya Farah OD as Assigned Surgical Provider    The following health maintenance items are reviewed in Epic and correct as of today:  Health Maintenance   Topic Date Due    LUNG CANCER SCREENING  03/28/2025    COVID-19 Vaccine (8 - 2024-25 season) 04/09/2025    NICOTINE/TOBACCO CESSATION COUNSELING Q 1 YR  02/17/2026    MEDICARE ANNUAL WELLNESS VISIT  02/17/2026    ANNUAL REVIEW OF HM ORDERS  02/17/2026    FALL RISK ASSESSMENT  02/17/2026    DTAP/TDAP/TD IMMUNIZATION (2 - Td or Tdap) 01/17/2027    GLUCOSE  02/07/2027    COLORECTAL CANCER SCREENING  04/19/2028    LIPID  02/07/2029    ADVANCE CARE PLANNING  02/17/2030    HEPATITIS C SCREENING  Completed    PHQ-2 (once per calendar year)  Completed    INFLUENZA VACCINE  Completed    Pneumococcal Vaccine: 50+ Years  Completed    ZOSTER IMMUNIZATION  Completed    RSV VACCINE  Completed    AORTIC ANEURYSM SCREENING (SYSTEM ASSIGNED)  Completed    HPV IMMUNIZATION  Aged Out    MENINGITIS IMMUNIZATION  Aged Out         Review of Systems  CONSTITUTIONAL: NEGATIVE for fever, chills, change in weight  INTEGUMENTARY/SKIN: NEGATIVE for worrisome rashes, moles or lesions  EYES: NEGATIVE for vision  "changes or irritation  ENT/MOUTH: NEGATIVE for ear, mouth and throat problems  RESP: NEGATIVE for significant cough or SOB  BREAST: NEGATIVE for masses, tenderness or discharge  CV: NEGATIVE for chest pain, palpitations or peripheral edema  GI: NEGATIVE for nausea, abdominal pain, heartburn, or change in bowel habits  : NEGATIVE for frequency, dysuria, or hematuria  MUSCULOSKELETAL: NEGATIVE for significant arthralgias or myalgia  NEURO: NEGATIVE for weakness, dizziness or paresthesias  ENDOCRINE: NEGATIVE for temperature intolerance, skin/hair changes  HEME: NEGATIVE for bleeding problems  PSYCHIATRIC: NEGATIVE for changes in mood or affect     Objective    Exam  /80 (BP Location: Right arm, Patient Position: Sitting, Cuff Size: Adult Regular)   Pulse 88   Temp 97.9  F (36.6  C) (Oral)   Resp 14   Ht 1.753 m (5' 9\")   Wt 51.4 kg (113 lb 6.4 oz)   SpO2 98%   BMI 16.75 kg/m     Estimated body mass index is 16.75 kg/m  as calculated from the following:    Height as of this encounter: 1.753 m (5' 9\").    Weight as of this encounter: 51.4 kg (113 lb 6.4 oz).    Physical Exam  GENERAL: alert and no distress  EYES: Eyes grossly normal to inspection, PERRL and conjunctivae and sclerae normal  HENT: Ear canals occluded as above but once wax is clear the canals and tympanic membranes appear normal  NECK: no adenopathy, no asymmetry, masses, or scars  RESP: lungs clear to auscultation - no rales, rhonchi or wheezes  CV: regular rate and rhythm, normal S1 S2, no S3 or S4, no murmur, click or rub, no peripheral edema  ABDOMEN: soft, nontender, no hepatosplenomegaly, no masses and bowel sounds normal  MS: no gross musculoskeletal defects noted, no edema  SKIN: no suspicious lesions or rashes  NEURO: Normal strength and tone, mentation intact and speech normal  PSYCH: mentation appears normal, affect normal/bright        2/17/2025   Mini Cog   Clock Draw Score 2 Normal   3 Item Recall 3 objects recalled   Mini Cog " Total Score 5              Signed Electronically by: Karley Heart MD  Lung Cancer Screening Shared Decision Making Visit     Carlos Soares, a 74 year old male, is eligible for lung cancer screening    History   Smoking Status    Every Day    Packs/day: 0.50    Years: 50.00    Types: Cigarettes    Last attempt to quit: 1/1/1972   Smokeless Tobacco    Never       I have discussed with patient the risks and benefits of screening for lung cancer with low-dose CT.     The risks include:    radiation exposure: one low dose chest CT has as much ionizing radiation as about 15 chest x-rays, or 6 months of background radiation living in [place]      false positives: most findings/nodules are NOT cancer, but some might still require additional diagnostic evaluation, including biopsy    over-diagnosis: some slow growing cancers that might never have been clinically significant will be detected and treated unnecessarily     The benefit of early detection of lung cancer is contingent upon adherence to annual screening or more frequent follow up if indicated.     Furthermore, to benefit from screening, Carlos must be willing and able to undergo diagnostic procedures, if indicated. Although no specific guide is available for determining severity of comorbidities, it is reasonable to withhold screening in patients who have greater mortality risk from other diseases.     We did discuss that the best way to prevent lung cancer is to not smoke.    Some patients may value a numeric estimation of lung cancer risk when evaluating if lung cancer screening is right for them, here is one calculator:    ShouldIScreen

## 2025-05-14 ENCOUNTER — OFFICE VISIT (OUTPATIENT)
Dept: OPTOMETRY | Facility: CLINIC | Age: 75
End: 2025-05-14
Payer: COMMERCIAL

## 2025-05-14 DIAGNOSIS — H52.4 REGULAR ASTIGMATISM WITH PRESBYOPIA, BILATERAL: Primary | ICD-10-CM

## 2025-05-14 DIAGNOSIS — H25.813 COMBINED FORMS OF AGE-RELATED CATARACT OF BOTH EYES: ICD-10-CM

## 2025-05-14 DIAGNOSIS — H52.223 REGULAR ASTIGMATISM WITH PRESBYOPIA, BILATERAL: Primary | ICD-10-CM

## 2025-05-14 PROCEDURE — 92015 DETERMINE REFRACTIVE STATE: CPT

## 2025-05-14 PROCEDURE — 92014 COMPRE OPH EXAM EST PT 1/>: CPT

## 2025-05-14 ASSESSMENT — REFRACTION_MANIFEST
OS_SPHERE: +0.00
OD_AXIS: 175
OS_AXIS: 161
OS_AXIS: 172
OS_ADD: +2.00
OD_SPHERE: +0.00
OS_CYLINDER: +0.75
OD_AXIS: 177
METHOD_AUTOREFRACTION: 1
OS_CYLINDER: +0.50
OS_SPHERE: -0.25
OD_ADD: +2.00
OD_SPHERE: +0.25
OD_CYLINDER: +1.25
OD_CYLINDER: +1.25

## 2025-05-14 ASSESSMENT — TONOMETRY
OD_IOP_MMHG: 18.3
OS_IOP_MMHG: 18.2
IOP_METHOD: ICARE

## 2025-05-14 ASSESSMENT — CUP TO DISC RATIO
OD_RATIO: 0.4
OS_RATIO: 0.4

## 2025-05-14 ASSESSMENT — CONF VISUAL FIELD
OS_INFERIOR_NASAL_RESTRICTION: 0
OS_INFERIOR_TEMPORAL_RESTRICTION: 0
OD_INFERIOR_TEMPORAL_RESTRICTION: 0
OS_SUPERIOR_NASAL_RESTRICTION: 0
OS_SUPERIOR_TEMPORAL_RESTRICTION: 0
OS_NORMAL: 1
OD_SUPERIOR_TEMPORAL_RESTRICTION: 0
OD_NORMAL: 1
OD_INFERIOR_NASAL_RESTRICTION: 0
OD_SUPERIOR_NASAL_RESTRICTION: 0

## 2025-05-14 ASSESSMENT — KERATOMETRY
OS_AXISANGLE2_DEGREES: 70
OD_AXISANGLE2_DEGREES: 98
OS_K2POWER_DIOPTERS: 43.75
OS_AXISANGLE_DEGREES: 160
OS_K1POWER_DIOPTERS: 43.25
OD_K2POWER_DIOPTERS: 43.75
OD_AXISANGLE_DEGREES: 8
OD_K1POWER_DIOPTERS: 43.00

## 2025-05-14 ASSESSMENT — VISUAL ACUITY
OD_SC: 20/20
OS_SC: 20/30
METHOD: SNELLEN - LINEAR
OS_SC+: +2
OS_CC: 20/25
OD_CC: 20/30
OD_CC+: -2
CORRECTION_TYPE: GLASSES
OS_CC: 20/20
OD_CC: 20/25

## 2025-05-14 ASSESSMENT — REFRACTION_WEARINGRX
OS_SPHERE: +0.00
OS_ADD: +2.50
OS_CYLINDER: +1.00
OD_ADD: +2.50
OS_AXIS: 180
SPECS_TYPE: LINED BIFOCAL
OD_CYLINDER: +1.25
OD_SPHERE: +0.00
OD_AXIS: 173

## 2025-05-14 ASSESSMENT — SLIT LAMP EXAM - LIDS
COMMENTS: TR MGD
COMMENTS: TR MGD

## 2025-05-14 ASSESSMENT — EXTERNAL EXAM - RIGHT EYE: OD_EXAM: NORMAL

## 2025-05-14 ASSESSMENT — EXTERNAL EXAM - LEFT EYE: OS_EXAM: NORMAL

## 2025-05-14 NOTE — LETTER
5/14/2025      Carlos Soares  [address] [facility] [address]      Dear Colleague,    Thank you for referring your patient, Carlos Soares, to the [facility]. Please see a copy of my visit note below.    Chief Complaint   Patient presents with     Annual Eye Exam         Last Eye Exam: 2023  Dilated Previously: Yes    What are you currently using to see?  glasses       Distance Vision Acuity: Satisfied with vision    Near Vision Acuity: Satisfied with vision while reading and using computer with glasses    Eye Comfort: good  Do you use eye drops? : Yes: clear eyes      Denelle Graicela - Optometric Assistant          Medical, surgical and family histories reviewed and updated 5/14/2025.       OBJECTIVE: See Ophthalmology exam    ASSESSMENT:    ICD-10-CM    1. Regular astigmatism with presbyopia, bilateral  H52.223     H52.4       2. Combined forms of age-related cataract of both eyes  H25.813           PLAN:     Patient Instructions   Regular astigmatism with presbyopia, both eyes: Updated bifocal glasses prescription for full-time wear.  Reduced plus bifocal for laptop working distance.  Monitor annually.  Combined forms of age-related cataracts, both eyes: Patient educated on condition. Minimally visually significant; no impact on ADLs. No treatment indicated at this time. Monitor annually.        Miya Farah, LEYDI      Again, thank you for allowing me to participate in the care of your patient.        Sincerely,        Miya Farah, LEYDI    Electronically signed

## 2025-05-14 NOTE — PATIENT INSTRUCTIONS
Regular astigmatism with presbyopia, both eyes: Updated bifocal glasses prescription for full-time wear.  Reduced plus bifocal for laptop working distance.  Monitor annually.  Combined forms of age-related cataracts, both eyes: Patient educated on condition. Minimally visually significant; no impact on ADLs. No treatment indicated at this time. Monitor annually.

## 2025-05-14 NOTE — PROGRESS NOTES
Chief Complaint   Patient presents with    Annual Eye Exam         Last Eye Exam: 2023  Dilated Previously: Yes    What are you currently using to see?  glasses       Distance Vision Acuity: Satisfied with vision    Near Vision Acuity: Satisfied with vision while reading and using computer with glasses    Eye Comfort: good  Do you use eye drops? : Yes: clear eyes      Denelle Graciela - Optometric Assistant          Medical, surgical and family histories reviewed and updated 5/14/2025.       OBJECTIVE: See Ophthalmology exam    ASSESSMENT:    ICD-10-CM    1. Regular astigmatism with presbyopia, bilateral  H52.223     H52.4       2. Combined forms of age-related cataract of both eyes  H25.813           PLAN:     Patient Instructions   Regular astigmatism with presbyopia, both eyes: Updated bifocal glasses prescription for full-time wear.  Reduced plus bifocal for laptop working distance.  Monitor annually.  Combined forms of age-related cataracts, both eyes: Patient educated on condition. Minimally visually significant; no impact on ADLs. No treatment indicated at this time. Monitor annually.        Miya Farah, OD     Skin normal color for race, warm, dry and intact. No evidence of rash.

## 2025-08-05 ENCOUNTER — MYC REFILL (OUTPATIENT)
Dept: FAMILY MEDICINE | Facility: CLINIC | Age: 75
End: 2025-08-05
Payer: COMMERCIAL

## 2025-08-05 DIAGNOSIS — F51.01 PRIMARY INSOMNIA: ICD-10-CM

## 2025-08-05 RX ORDER — LORAZEPAM 1 MG/1
1 TABLET ORAL AT BEDTIME
Qty: 90 TABLET | Refills: 0 | Status: SHIPPED | OUTPATIENT
Start: 2025-08-05

## (undated) DEVICE — SOL WATER IRRIG 1000ML BOTTLE 07139-09

## (undated) RX ORDER — FENTANYL CITRATE 50 UG/ML
INJECTION, SOLUTION INTRAMUSCULAR; INTRAVENOUS
Status: DISPENSED
Start: 2018-04-19